# Patient Record
Sex: FEMALE | Race: WHITE | Employment: PART TIME | RURAL
[De-identification: names, ages, dates, MRNs, and addresses within clinical notes are randomized per-mention and may not be internally consistent; named-entity substitution may affect disease eponyms.]

---

## 2017-01-04 ENCOUNTER — TELEPHONE (OUTPATIENT)
Dept: FAMILY MEDICINE CLINIC | Age: 27
End: 2017-01-04

## 2017-01-04 NOTE — TELEPHONE ENCOUNTER
Pt stated she started coughing yesterday, has drainage and lots of phlegm, chest feels heavy. \" It hit her hard\". She is breast feeding her 10 mouth old and would like to be seen. Please advise.

## 2017-01-04 NOTE — TELEPHONE ENCOUNTER
Can see in a.m. salt water gargles Mucinex or Robitussin-DM acetaminophen ibuprofen as needed lots of fluids

## 2017-01-05 ENCOUNTER — OFFICE VISIT (OUTPATIENT)
Dept: FAMILY MEDICINE CLINIC | Age: 27
End: 2017-01-05

## 2017-01-05 VITALS
SYSTOLIC BLOOD PRESSURE: 114 MMHG | OXYGEN SATURATION: 97 % | HEIGHT: 64 IN | RESPIRATION RATE: 18 BRPM | BODY MASS INDEX: 24.24 KG/M2 | DIASTOLIC BLOOD PRESSURE: 76 MMHG | HEART RATE: 90 BPM | WEIGHT: 142 LBS | TEMPERATURE: 98 F

## 2017-01-05 DIAGNOSIS — J22 LOWER RESP. TRACT INFECTION: Primary | ICD-10-CM

## 2017-01-05 DIAGNOSIS — J40 BRONCHITIS: ICD-10-CM

## 2017-01-05 RX ORDER — ALBUTEROL SULFATE 0.83 MG/ML
2.5 SOLUTION RESPIRATORY (INHALATION) ONCE
Qty: 1 EACH | Refills: 0
Start: 2017-01-05 | End: 2017-01-05

## 2017-01-05 RX ORDER — ALBUTEROL SULFATE 90 UG/1
1 AEROSOL, METERED RESPIRATORY (INHALATION)
Qty: 1 INHALER | Refills: 11 | Status: SHIPPED | OUTPATIENT
Start: 2017-01-05 | End: 2019-05-29 | Stop reason: SDUPTHER

## 2017-01-05 RX ORDER — BENZONATATE 100 MG/1
100 CAPSULE ORAL
Qty: 21 CAP | Refills: 3 | Status: SHIPPED | OUTPATIENT
Start: 2017-01-05 | End: 2017-01-12

## 2017-01-05 NOTE — PROGRESS NOTES
Subjective:     Allison Hodges is a 27 y.o. female who presents today with the following:  Chief Complaint   Patient presents with    Cold Symptoms     heavy chest feeling, coughing, drainage, off balance, chills     Marisela Hicks presents with cough and sore throat x 2 days. Woke up with chest tightness and wheezing. Ill contacts her father was sick over [de-identified]. She is nursing 11 month old . She has tried decaffeinated hot tea and robitussin. Minimal sleep. ROS:  Gen: denies fever, chills, positive fatigue, weight loss, weight gain  HEENT:denies blurry vision,positive  nasal congestion, sore throat  Resp: denies dypsnea, positive cough, wheezing  CV: denies chest pain radiating to the jaws or arms, palpitations, lower extremity edema  Abd: denies nausea, vomiting, diarrhea, constipation  Neuro: denies numbness/tingling  Endo: denies polyuria, polydipsia, heat/cold intolerance  Heme: no lymphadenopathy    Allergies   Allergen Reactions    Pcn [Penicillins] Other (comments)     Family hx of allergies to PCN. Current Outpatient Prescriptions:     PNV/IRON,CARBONYL/DOCUSATE/FA (PRENA-CAP PO), Take  by mouth daily. OTC For nursing baby, Disp: , Rfl:     docusate sodium (COLACE) 100 mg capsule, Take 100 mg by mouth. 1-2/day, Disp: , Rfl:     triamcinolone acetonide (KENALOG) 0.5 % ointment, Apply  to affected area two (2) times a day. use thin layer, Disp: 30 g, Rfl: 0    Past Medical History   Diagnosis Date    ADD (attention deficit disorder)     Herpes simplex virus (HSV) infection     Hx of herpes simplex infection 05/13/2009     lips    Panic        No past surgical history on file.     History   Smoking Status    Former Smoker   Smokeless Tobacco    Not on file       Social History     Social History    Marital status: SINGLE     Spouse name: N/A    Number of children: N/A    Years of education: N/A     Social History Main Topics    Smoking status: Former Smoker    Smokeless tobacco: None    Alcohol use No    Drug use: No    Sexual activity: Yes     Partners: Male     Other Topics Concern    None     Social History Narrative       Family History   Problem Relation Age of Onset    Asthma Sister     Migraines Maternal Grandmother     Heart Disease Paternal Grandmother     Lung Disease Paternal Grandmother     Hypertension Paternal Grandmother     Hypertension Paternal Grandfather          Objective:     Visit Vitals    /76 (BP 1 Location: Left arm, BP Patient Position: Sitting)    Pulse 90    Temp 98 °F (36.7 °C) (Oral)    Resp 18    Ht 5' 4\" (1.626 m)    Wt 142 lb (64.4 kg)    SpO2 97%    BMI 24.37 kg/m2     Body mass index is 24.37 kg/(m^2). Gen: alert, oriented, no acute distress  Head: normocephalic, atraumatic  Eyes:sclera clear, conjunctiva clear  Oral: moist mucus membranes, no oral lesions, no pharyngeal exudate, positive  pharyngeal erythema, negative cervical adenopathy  Neck: symmetric normal sized thyroid, no carotid bruits, no JVD  Resp: Normal work of breathing, bilateral wheezing with expiration. No r/r  CV: S1, S2 normal.  No murmurs    Abd:  Normal bowel sounds. Soft, not tender, not distended. Skin: no rash             Extremities: no edema  Albuterol neb in office negative for wheezing post treatment. No results found for this visit on 01/05/17. Assessment/ Plan:     Bronchitis  Albuterol neb tx  In office  Tessalon for cough    Verbal and written instructions (see AVS) provided.  Patient expresses understanding of diagnosis and treatment plan.     Deepak Puente, JAZMIN

## 2017-01-05 NOTE — MR AVS SNAPSHOT
Visit Information Date & Time Provider Department Dept. Phone Encounter #  
 1/5/2017  9:00 AM Antonio Root NP [address] [phone] [id number] Upcoming Health Maintenance Date Due  
 HPV AGE 9Y-34Y (1 of 3 - Female 3 Dose Series) 6/3/2003 DTaP/Tdap/Td series (1 - Tdap) 6/3/2013 PAP AKA CERVICAL CYTOLOGY 6/3/2013 INFLUENZA AGE 9 TO ADULT 8/1/2016 Allergies as of 1/5/2017  Review Complete On: 1/5/2017 By: Antonio Root NP Severity Noted Reaction Type Reactions Pcn [Penicillins]  09/16/2013    Other (comments) Family hx of allergies to PCN. Current Immunizations  Never Reviewed No immunizations on file. Not reviewed this visit You Were Diagnosed With   
  
 Codes Comments Lower resp. tract infection    -  Primary ICD-10-CM: Ramsey Bumpers ICD-9-CM: 519.8 Vitals BP Pulse Temp Resp Height(growth percentile) Weight(growth percentile) 114/76 (BP 1 Location: Left arm, BP Patient Position: Sitting) 90 98 °F (36.7 °C) (Oral) 18 5' 4\" (1.626 m) 142 lb (64.4 kg) SpO2 BMI OB Status Smoking Status 97% 24.37 kg/m2 Recent pregnancy Former Smoker Vitals History BMI and BSA Data Body Mass Index Body Surface Area  
 24.37 kg/m 2 1.71 m 2 Preferred Pharmacy Pharmacy Name Phone [facility] [place] [address] [address] [phone] Your Updated Medication List  
  
   
This list is accurate as of: 1/5/17 10:39 AM.  Always use your most recent med list.  
  
  
  
  
 * albuterol 90 mcg/actuation inhaler Commonly known as:  PROVENTIL HFA, VENTOLIN HFA, PROAIR HFA Take 1 Puff by inhalation every six (6) hours as needed for Wheezing. Indications: BRONCHOSPASM PREVENTION  
  
 * albuterol 2.5 mg /3 mL (0.083 %) nebulizer solution Commonly known as:  PROVENTIL VENTOLIN  
3 mL by Nebulization route once for 1 dose. benzonatate 100 mg capsule Commonly known as:  TESSALON Take 1 Cap by mouth three (3) times daily as needed for Cough for up to 7 days. COLACE 100 mg capsule Generic drug:  docusate sodium Take 100 mg by mouth. 1-2/day PRENA-CAP PO Take  by mouth daily. OTC For nursing baby  
  
 triamcinolone acetonide 0.5 % ointment Commonly known as:  KENALOG Apply  to affected area two (2) times a day. use thin layer * Notice: This list has 2 medication(s) that are the same as other medications prescribed for you. Read the directions carefully, and ask your doctor or other care provider to review them with you. Prescriptions Sent to Pharmacy Refills  
 benzonatate (TESSALON) 100 mg capsule 3 Sig: Take 1 Cap by mouth three (3) times daily as needed for Cough for up to 7 days. Class: Normal  
 Pharmacy: [address] [facility] [address], [address] Ph #: [phone] Route: Oral  
 albuterol (PROVENTIL HFA, VENTOLIN HFA, PROAIR HFA) 90 mcg/actuation inhaler 11 Sig: Take 1 Puff by inhalation every six (6) hours as needed for Wheezing. Indications: BRONCHOSPASM PREVENTION Class: Normal  
 Pharmacy: [address] [facility] [address], [address] Ph #: [phone] Route: Inhalation Introducing [facility]! [facility] introduces [employer] patient portal. Now you can access parts of your medical record, email your doctor's office, and request medication refills online. 1. In your internet browser, go to [email or web address] 2. Click on the First Time User? Click Here link in the Sign In box. You will see the New Member Sign Up page. 3. Enter your [employer] Access Code exactly as it appears below. You will not need to use this code after youve completed the sign-up process. If you do not sign up before the expiration date, you must request a new code. · [employer] Access Code: [email or web address] Expires: 1/8/2017  3:01 PM 
 
 4. Enter the last four digits of your Social Security Number (xxxx) and Date of Birth (mm/dd/yyyy) as indicated and click Submit. You will be taken to the next sign-up page. 5. Create a [employer] ID. This will be your [employer] login ID and cannot be changed, so think of one that is secure and easy to remember. 6. Create a [employer] password. You can change your password at any time. 7. Enter your Password Reset Question and Answer. This can be used at a later time if you forget your password. 8. Enter your e-mail address. You will receive e-mail notification when new information is available in [address] 9. Click Sign Up. You can now view and download portions of your medical record. 10. Click the Download Summary menu link to download a portable copy of your medical information. If you have questions, please visit the Frequently Asked Questions section of the [employer] website. Remember, [employer] is NOT to be used for urgent needs. For medical emergencies, dial 911. Now available from your iPhone and Android! Please provide this summary of care documentation to your next provider. Your primary care clinician is listed as Merced Chaudhari. If you have any questions after today's visit, please call [phone].

## 2017-05-30 ENCOUNTER — TELEPHONE (OUTPATIENT)
Dept: FAMILY MEDICINE CLINIC | Age: 27
End: 2017-05-30

## 2017-05-30 NOTE — TELEPHONE ENCOUNTER
Spoke with patient. Feeling ok now just has a cough. Denies any fever,chills or sob. Advised of at home treatments,robutussin saline nasal flushes and salt water gargles. Advised to Er if symptoms get worse.

## 2017-05-30 NOTE — TELEPHONE ENCOUNTER
Patient would like to be worked in for complaints of loss of voice since Friday with a cough and sinus drainage.

## 2017-11-17 ENCOUNTER — OFFICE VISIT (OUTPATIENT)
Dept: FAMILY MEDICINE CLINIC | Age: 27
End: 2017-11-17

## 2017-11-17 VITALS
HEIGHT: 64 IN | OXYGEN SATURATION: 99 % | HEART RATE: 100 BPM | TEMPERATURE: 98.2 F | WEIGHT: 130.8 LBS | RESPIRATION RATE: 22 BRPM | BODY MASS INDEX: 22.33 KG/M2 | SYSTOLIC BLOOD PRESSURE: 90 MMHG | DIASTOLIC BLOOD PRESSURE: 56 MMHG

## 2017-11-17 DIAGNOSIS — J02.9 SORE THROAT: ICD-10-CM

## 2017-11-17 DIAGNOSIS — J02.9 PHARYNGITIS, UNSPECIFIED ETIOLOGY: Primary | ICD-10-CM

## 2017-11-17 LAB
S PYO AG THROAT QL: NEGATIVE
VALID INTERNAL CONTROL?: YES

## 2017-11-17 RX ORDER — CEFDINIR 300 MG/1
300 CAPSULE ORAL 2 TIMES DAILY
Qty: 20 CAP | Refills: 0 | Status: SHIPPED | OUTPATIENT
Start: 2017-11-17 | End: 2017-11-27

## 2017-11-17 RX ORDER — VALACYCLOVIR HYDROCHLORIDE 500 MG/1
TABLET, FILM COATED ORAL 2 TIMES DAILY
COMMUNITY
End: 2018-06-14

## 2017-11-17 NOTE — MR AVS SNAPSHOT
Visit Information Date & Time Provider Department Dept. Phone Encounter #  
 11/17/2017  7:45 AM CHRISTINE Rosado [address] [place] [address] [phone] [id number] Upcoming Health Maintenance Date Due  
 HPV AGE 9Y-34Y (1 of 3 - Female 3 Dose Series) 6/3/2003 DTaP/Tdap/Td series (1 - Tdap) 6/3/2013 PAP AKA CERVICAL CYTOLOGY 6/3/2013 Influenza Age 5 to Adult 8/1/2017 Allergies as of 11/17/2017  Review Complete On: 11/17/2017 By: CHRISTINE Rosado Severity Noted Reaction Type Reactions Pcn [Penicillins]  09/16/2013    Other (comments) Family hx of allergies to PCN. Current Immunizations  Never Reviewed No immunizations on file. Not reviewed this visit You Were Diagnosed With   
  
 Codes Comments Pharyngitis, unspecified etiology    -  Primary ICD-10-CM: J02.9 ICD-9-CM: 048 Sore throat     ICD-10-CM: J02.9 ICD-9-CM: 138 Vitals BP Pulse Temp Resp Height(growth percentile) Weight(growth percentile) 90/56 (BP 1 Location: Left arm, BP Patient Position: Sitting) 100 98.2 °F (36.8 °C) (Temporal) 22 5' 4\" (1.626 m) 130 lb 12.8 oz (59.3 kg) LMP SpO2 BMI OB Status Smoking Status 11/07/2017 99% 22.45 kg/m2 Having regular periods Former Smoker Vitals History BMI and BSA Data Body Mass Index Body Surface Area  
 22.45 kg/m 2 1.64 m 2 Preferred Pharmacy Pharmacy Name Phone [address] Megan Sutherlande Con[place] [phone] Your Updated Medication List  
  
   
This list is accurate as of: 11/17/17  8:14 AM.  Always use your most recent med list.  
  
  
  
  
 albuterol 90 mcg/actuation inhaler Commonly known as:  PROVENTIL HFA, VENTOLIN HFA, PROAIR HFA Take 1 Puff by inhalation every six (6) hours as needed for Wheezing. Indications: BRONCHOSPASM PREVENTION  
  
 cefdinir 300 mg capsule Commonly known as:  OMNICEF  
 Take 1 Cap by mouth two (2) times a day for 10 days. COLACE 100 mg capsule Generic drug:  docusate sodium Take 100 mg by mouth. 1-2/day OTHER Birth control pills  
  
 triamcinolone acetonide 0.5 % ointment Commonly known as:  KENALOG Apply  to affected area two (2) times a day. use thin layer VALTREX 500 mg tablet Generic drug:  valACYclovir Take  by mouth two (2) times a day. Prescriptions Sent to Pharmacy Refills  
 cefdinir (OMNICEF) 300 mg capsule 0 Sig: Take 1 Cap by mouth two (2) times a day for 10 days. Class: Normal  
 Pharmacy: [address] [facility] Nikko Hoffman Ph #: [phone] Route: Oral  
  
We Performed the Following AMB POC RAPID STREP A [00762 CPT(R)] Introducing [facility]! Jamarcus Ramachandran introduces [employer] patient portal. Now you can access parts of your medical record, email your doctor's office, and request medication refills online. 1. In your internet browser, go to [email or web address] 2. Click on the First Time User? Click Here link in the Sign In box. You will see the New Member Sign Up page. 3. Enter your [employer] Access Code exactly as it appears below. You will not need to use this code after youve completed the sign-up process. If you do not sign up before the expiration date, you must request a new code. · [employer] Access Code: [email or web address] Expires: 2/15/2018  8:14 AM 
 
4. Enter the last four digits of your Social Security Number (xxxx) and Date of Birth (mm/dd/yyyy) as indicated and click Submit. You will be taken to the next sign-up page. 5. Create a [employer] ID. This will be your [employer] login ID and cannot be changed, so think of one that is secure and easy to remember. 6. Create a [employer] password. You can change your password at any time. 7. Enter your Password Reset Question and Answer.  This can be used at a later time if you forget your password. 8. Enter your e-mail address. You will receive e-mail notification when new information is available in [address] 9. Click Sign Up. You can now view and download portions of your medical record. 10. Click the Download Summary menu link to download a portable copy of your medical information. If you have questions, please visit the Frequently Asked Questions section of the [employer] website. Remember, [employer] is NOT to be used for urgent needs. For medical emergencies, dial 911. Now available from your iPhone and Android! Please provide this summary of care documentation to your next provider. Your primary care clinician is listed as Raleigh Dias. If you have any questions after today's visit, please call [phone].

## 2017-11-17 NOTE — PROGRESS NOTES
[address] [place] [address] [address] [place]   [phone]     11/17/2017  Chief Complaint   Patient presents with   John FAITH  Ms. Zeeshan Ramirez is a 24 y.o. female presents today for acute care visit   C/o sore throat that began about 3 weeks ago. Initially had sinus congestion with cough that has since resolved but throat pain has persisted. Right sided. She was seen at urgent care 2 weeks ago and was treated for strep throat although she states they did not test for strep. She was treated with a Z jb. The head cold resolved but the throat pain persisted     Denies fever. Denies difficulty swallowing just reports discomfort. Past Medical History:   Diagnosis Date    ADD (attention deficit disorder)     Herpes simplex virus (HSV) infection     Hx of herpes simplex infection 05/13/2009    lips    Panic        Allergies   Allergen Reactions    Pcn [Penicillins] Other (comments)     Family hx of allergies to PCN. Current Outpatient Prescriptions   Medication Sig    OTHER Birth control pills    valACYclovir (VALTREX) 500 mg tablet Take  by mouth two (2) times a day.  cefdinir (OMNICEF) 300 mg capsule Take 1 Cap by mouth two (2) times a day for 10 days.  albuterol (PROVENTIL HFA, VENTOLIN HFA, PROAIR HFA) 90 mcg/actuation inhaler Take 1 Puff by inhalation every six (6) hours as needed for Wheezing. Indications: BRONCHOSPASM PREVENTION    triamcinolone acetonide (KENALOG) 0.5 % ointment Apply  to affected area two (2) times a day. use thin layer    docusate sodium (COLACE) 100 mg capsule Take 100 mg by mouth. 1-2/day     No current facility-administered medications for this visit.       Results for orders placed or performed in visit on 11/17/17   AMB POC RAPID STREP A   Result Value Ref Range    VALID INTERNAL CONTROL POC Yes     Group A Strep Ag Negative Negative         Visit Vitals    BP 90/56 (BP 1 Location: Left arm, BP Patient Position: Sitting)    Pulse 100    Temp 98.2 °F (36.8 °C) (Temporal)    Resp 22    Ht 5' 4\" (1.626 m)    Wt 130 lb 12.8 oz (59.3 kg)    LMP 11/07/2017    SpO2 99%    BMI 22.45 kg/m2     Physical Exam   Constitutional: She appears well-developed and well-nourished. She appears distressed. HENT:   Right Ear: Tympanic membrane normal.   Left Ear: Tympanic membrane normal.   Mouth/Throat: Uvula is midline. Posterior oropharyngeal erythema present. No oropharyngeal exudate or posterior oropharyngeal edema. Neck: No thyroid mass present. Cardiovascular: Normal heart sounds. Pulmonary/Chest: Stridor: posteriorly right side    Lymphadenopathy:        Head (right side): Tonsillar adenopathy present. She has cervical adenopathy. Vitals reviewed. ICD-10-CM ICD-9-CM    1. Pharyngitis, unspecified etiology J02.9 462 cefdinir (OMNICEF) 300 mg capsule   2. Sore throat J02.9 462 AMB POC RAPID STREP A     To use OTC analgesics as needed for pain        Follow-up Disposition:  Return if symptoms worsen or fail to improve.       Dinh Johns PA-C, NENAP

## 2017-11-29 ENCOUNTER — TELEPHONE (OUTPATIENT)
Dept: FAMILY MEDICINE CLINIC | Age: 27
End: 2017-11-29

## 2017-11-29 NOTE — TELEPHONE ENCOUNTER
Patient needs the last two years of her chart copied for . She needs this for discovery for court on Monday, December 4. Can we print this off for her or should I go through [employer] which can take up to two weeks?

## 2018-01-16 ENCOUNTER — OFFICE VISIT (OUTPATIENT)
Dept: FAMILY MEDICINE CLINIC | Age: 28
End: 2018-01-16

## 2018-01-16 VITALS
OXYGEN SATURATION: 99 % | HEART RATE: 102 BPM | TEMPERATURE: 98.4 F | HEIGHT: 64 IN | SYSTOLIC BLOOD PRESSURE: 100 MMHG | RESPIRATION RATE: 20 BRPM | DIASTOLIC BLOOD PRESSURE: 70 MMHG | WEIGHT: 136 LBS | BODY MASS INDEX: 23.22 KG/M2

## 2018-01-16 DIAGNOSIS — M79.674 PAIN OF TOE OF RIGHT FOOT: Primary | ICD-10-CM

## 2018-01-16 DIAGNOSIS — M54.2 NECK PAIN: ICD-10-CM

## 2018-01-16 RX ORDER — AZITHROMYCIN 250 MG/1
TABLET, FILM COATED ORAL
Qty: 6 TAB | Refills: 0 | Status: SHIPPED | OUTPATIENT
Start: 2018-01-16 | End: 2018-06-14

## 2018-01-16 NOTE — LETTER
NOTIFICATION RETURN TO WORK / SCHOOL 2018 Re: Win Bae : 1990 To Whom It May Concern: Shaquille Lopez is currently under the care of Alicia Messina and was seen today. She will return to work tomorrow. If there are questions or concerns please have the patient contact our office.  
 
Sincerely, 
 
 
Berta Armando NP

## 2018-01-16 NOTE — PROGRESS NOTES
Subjective:     Ryland Root is a 24 y.o. female who presents today with the following:  Chief Complaint   Patient presents with    Toe Pain     rt big toe    Neck Pain   Yairhaley Osullivan presents for an acute visit. Woke up past night her right great toe was throbbing. Trimmed her nail back soaked the toe with some improvement. Neck Pain: She feels that she slept on it the wrong way. Ffollowed by a chiropracter In [facility] due for a treatment. Plans to call and make an appointment. Declines evaluation and treatment at this time. COMPLIANT WITH MEDICATION:         ROS:  Gen: denies fever, chills, fatigue, weight loss, weight gain  HEENT:denies blurry vision, nasal congestion, sore throat  Resp: denies dypsnea, cough, wheezing  CV: denies chest pain radiating to the jaws or arms, palpitations, lower extremity edema  Abd: denies nausea, vomiting, diarrhea, constipation  Neuro: denies numbness/tingling  Endo: denies polyuria, polydipsia, heat/cold intolerance  Heme: no lymphadenopathy    Allergies   Allergen Reactions    Pcn [Penicillins] Other (comments)     Family hx of allergies to PCN. Current Outpatient Prescriptions:     azithromycin (ZITHROMAX) 250 mg tablet, Take 2 tablets today, then take 1 tablet daily  Indications: SKIN AND SKIN STRUCTURE STREP AGALACTIAE INFECTION, Disp: 6 Tab, Rfl: 0    OTHER, Birth control pills, Disp: , Rfl:     valACYclovir (VALTREX) 500 mg tablet, Take  by mouth two (2) times a day., Disp: , Rfl:     docusate sodium (COLACE) 100 mg capsule, Take 100 mg by mouth. 1-2/day, Disp: , Rfl:     triamcinolone acetonide (KENALOG) 0.5 % ointment, Apply  to affected area two (2) times a day. use thin layer, Disp: 30 g, Rfl: 0    albuterol (PROVENTIL HFA, VENTOLIN HFA, PROAIR HFA) 90 mcg/actuation inhaler, Take 1 Puff by inhalation every six (6) hours as needed for Wheezing.  Indications: BRONCHOSPASM PREVENTION, Disp: 1 Inhaler, Rfl: 11    Past Medical History:   Diagnosis Date    ADD (attention deficit disorder)     Herpes simplex virus (HSV) infection     Hx of herpes simplex infection 05/13/2009    lips    Panic        No past surgical history on file. History   Smoking Status    Former Smoker   Smokeless Tobacco    Never Used       Social History     Social History    Marital status: SINGLE     Spouse name: N/A    Number of children: N/A    Years of education: N/A     Social History Main Topics    Smoking status: Former Smoker    Smokeless tobacco: Never Used    Alcohol use No    Drug use: No    Sexual activity: Yes     Partners: Male     Other Topics Concern    None     Social History Narrative       Family History   Problem Relation Age of Onset    Asthma Sister     Migraines Maternal Grandmother     Heart Disease Paternal Grandmother     Lung Disease Paternal Grandmother     Hypertension Paternal Grandmother     Hypertension Paternal Grandfather          Objective:     Visit Vitals    /70 (BP 1 Location: Left arm, BP Patient Position: Sitting)    Pulse (!) 102    Temp 98.4 °F (36.9 °C) (Temporal)    Resp 20    Ht 5' 4\" (1.626 m)    Wt 136 lb (61.7 kg)    SpO2 99%    BMI 23.34 kg/m2     Body mass index is 23.34 kg/(m^2). General: Alert and oriented. No acute distress. Well nourished  HEENT :  Ears:TMs are normal. Canals are clear. Eyes: pupils equal, round, react to light and accommodation. Extra ocular movements intact. Nose: patent. Mouth and throat is clear. Neck:supple full range of motion no thyromegaly. Trachea midline, No carotid bruits. No significant lymphadenopathy  Lungs[de-identified] clear to auscultation without wheezes, rales, or rhonchi. Heart :RRR, S1 & S2 are normal intensity. No murmur; no gallop  Abdomen: bowel sounds active. No tenderness, guarding, rebound, masses, hepatic or spleen enlargement  Back: no CVA tenderness. Extremities: without clubbing, cyanosis, or edema. Right great toe mild  Erythema and trace edema. Pulses: radial and femoral pulses are normal  Neuro: HMF intact. Cranial nerves II through XII grossly normal.            No results found for this visit on 01/16/18. Assessment/ Plan:     Diagnoses and all orders for this visit:    1. Pain of toe of right foot    2. BMI 23.0-23.9, adult    3. Neck pain    Other orders  -     azithromycin (ZITHROMAX) 250 mg tablet; Take 2 tablets today, then take 1 tablet daily  Indications: SKIN AND SKIN STRUCTURE STREP AGALACTIAE INFECTION         1. Pain of toe of right foot    2. BMI 23.0-23.9, adult    3. Neck pain        Orders Placed This Encounter    azithromycin (ZITHROMAX) 250 mg tablet     Sig: Take 2 tablets today, then take 1 tablet daily  Indications: SKIN AND SKIN STRUCTURE STREP AGALACTIAE INFECTION     Dispense:  6 Tab     Refill:  0     Discussed soaking in epsom salt bid . Antibiotic prophylactic for skin structure infection. Verbal and written instructions (see AVS) provided.  Patient expresses understanding of diagnosis and treatment plan. Follow-up Disposition:  Return if symptoms worsen or fail to improve.       Angi Oakes, OLGAP-C

## 2018-06-14 PROBLEM — F41.1 GAD (GENERALIZED ANXIETY DISORDER): Status: ACTIVE | Noted: 2018-06-14

## 2018-08-16 ENCOUNTER — CLINICAL SUPPORT (OUTPATIENT)
Dept: FAMILY MEDICINE CLINIC | Age: 28
End: 2018-08-16

## 2018-08-16 DIAGNOSIS — F41.1 GENERALIZED ANXIETY DISORDER: Primary | ICD-10-CM

## 2018-08-16 NOTE — MR AVS SNAPSHOT
[address] [facility] [address] 
 
 
 [address] 
[phone] Patient: Allison Hodges MRN: [id number] ZSX:[id number] Visit Information Date & Time Provider Department Dept. Phone Encounter #  
 8/16/2018 10:30 AM GIL Germán Angeli [facility] PRACTICE [phone] [id number] Your Appointments 8/16/2018 10:30 AM  
[facility] Appt Note: Labs for another provider/will bring order [address] [address]  
[address] [facility] [address] [id number] Upcoming Health Maintenance Date Due  
 HPV Age 9Y-34Y (1 of 3 - Female 3 Dose Series) 6/3/2003 DTaP/Tdap/Td series (1 - Tdap) 6/3/2013 PAP AKA CERVICAL CYTOLOGY 6/3/2013 Influenza Age 5 to Adult 8/1/2018 Allergies as of 8/16/2018  Review Complete On: 6/14/2018 By: Aleksandar Martin NP Severity Noted Reaction Type Reactions Pcn [Penicillins]  09/16/2013    Other (comments) Family hx of allergies to PCN. Current Immunizations  Never Reviewed No immunizations on file. Not reviewed this visit You Were Diagnosed With   
  
 Codes Comments Generalized anxiety disorder    -  Primary ICD-10-CM: F41.1 ICD-9-CM: 300.02 Vitals OB Status Smoking Status Having regular periods Former Smoker Preferred Pharmacy Pharmacy Name Phone [address], [facility] [phone] Your Updated Medication List  
  
   
This list is accurate as of 8/16/18 10:14 AM.  Always use your most recent med list.  
  
  
  
  
 albuterol 90 mcg/actuation inhaler Commonly known as:  PROVENTIL HFA, VENTOLIN HFA, PROAIR HFA Take 1 Puff by inhalation every six (6) hours as needed for Wheezing. Indications: BRONCHOSPASM PREVENTION  
  
 fluvoxaMINE 100 mg tablet Commonly known as:  Georgana Genre Take 1 Tab by mouth nightly. hydrOXYzine HCl 50 mg tablet Commonly known as:  ATARAX Take 1 Tab by mouth nightly. May increase to 2 at HS if ineffective. Indications: anxiety, insomnia OTHER Birth control pills We Performed the Following COLLECTION VENOUS BLOOD,VENIPUNCTURE [id number] CPT(R)] T4, FREE [id number] CPT(R)] TSH 3RD GENERATION [24738 CPT(R)] To-Do List   
 08/16/2018 4:00 PM  
  Appointment with Silvia De Santiago NP at [address] [place] [address] ([phone]) Introducing [facility]! [facility] introduces y prime patient portal. Now you can access parts of your medical record, email your doctor's office, and request medication refills online. 1. In your internet browser, go to [email or web address] 2. Click on the First Time User? Click Here link in the Sign In box. You will see the New Member Sign Up page. 3. Enter your y prime Access Code exactly as it appears below. You will not need to use this code after youve completed the sign-up process. If you do not sign up before the expiration date, you must request a new code. · y prime Access Code: [email or web address] Expires: 10/1/2018  5:26 PM 
 
4. Enter the last four digits of your Social Security Number (xxxx) and Date of Birth (mm/dd/yyyy) as indicated and click Submit. You will be taken to the next sign-up page. 5. Create a y prime ID. This will be your y prime login ID and cannot be changed, so think of one that is secure and easy to remember. 6. Create a y prime password. You can change your password at any time. 7. Enter your Password Reset Question and Answer. This can be used at a later time if you forget your password. 8. Enter your e-mail address. You will receive e-mail notification when new information is available in [address] 9. Click Sign Up. You can now view and download portions of your medical record. 10. Click the Download Summary menu link to download a portable copy of your medical information. If you have questions, please visit the Frequently Asked Questions section of the [employer] website. Remember, [employer] is NOT to be used for urgent needs. For medical emergencies, dial 911. Now available from your iPhone and Android! Please provide this summary of care documentation to your next provider. Your primary care clinician is listed as Edmund Blandon. If you have any questions after today's visit, please call [phone].

## 2018-08-17 LAB
T4 FREE SERPL-MCNC: 1.02 NG/DL (ref 0.82–1.77)
TSH SERPL DL<=0.005 MIU/L-ACNC: 0.56 UIU/ML (ref 0.45–4.5)

## 2019-05-09 PROBLEM — F32.A DEPRESSION: Status: ACTIVE | Noted: 2019-05-09

## 2019-10-03 PROBLEM — G47.00 INSOMNIA DISORDER WITH NON-SLEEP DISORDER MENTAL COMORBIDITY: Status: ACTIVE | Noted: 2019-10-03

## 2020-09-01 PROBLEM — R19.8 GASTROINTESTINAL PROBLEM: Status: ACTIVE | Noted: 2019-04-25

## 2020-10-09 RX ORDER — ALBUTEROL SULFATE 90 UG/1
2 AEROSOL, METERED RESPIRATORY (INHALATION)
Qty: 1 INHALER | Refills: 1 | Status: SHIPPED | OUTPATIENT
Start: 2020-10-09 | End: 2021-07-07 | Stop reason: SDUPTHER

## 2021-03-30 ENCOUNTER — APPOINTMENT (OUTPATIENT)
Dept: BEHAVIORAL/MENTAL HEALTH | Age: 31
End: 2021-03-30

## 2021-04-21 ENCOUNTER — HOSPITAL ENCOUNTER (OUTPATIENT)
Dept: BEHAVIORAL/MENTAL HEALTH | Age: 31
Discharge: HOME | End: 2021-04-21

## 2021-04-21 VITALS
DIASTOLIC BLOOD PRESSURE: 88 MMHG | WEIGHT: 221 LBS | BODY MASS INDEX: 36.78 KG/M2 | SYSTOLIC BLOOD PRESSURE: 120 MMHG | HEART RATE: 106 BPM

## 2021-04-21 NOTE — BH NOTES
Name: Jeanine Choudhary    MRN:  [id number]   Time In: 2:15 PM     Time Out: 2:51 PM  Date: 4/21/2021           Collateral: none    Patient Identification: Jeanine Choudhary is a 31year old single mother of one son, Pedro Luis Tomas. She lives with her mother and son. She works part time as a . Progress Note: Jodie Tariq has been stressed at work recently. She has a new boss and her schedule is not guaranteed to remain stable which she needs in order to care for her son. She recently broke down at work because of anxiety and stress. Her son has been having behavior problems in school. She is not really able to comment on her mood otherwise although she feels somewhat blah. Jodie Tariq has been on 30 mg Cymbalta at night and 150 mg Wellbutrin in the AM although she misses the dose frequently. She thinks she may miss it once or twice a week. Jodie Tariq has been having trouble falling asleep since the work troubles started. She usually sleeps soundly once asleep, but she is sometimes awake until 1 AM waiting to fall asleep. She experiences daytime sleepiness at times but not everyday. Jodie Tariq has been enjoying working in the garden. She is happy to have a new little duckling that hatched. She is looking forward to a road trip with friends to [place]. She continues to be frustrated with her weight. Mental Status Examination    I. Reliability in Providing Information: Good (04/21/21 1449)    II. Personal Presentation: Looks stated age;Dresses appropriately (04/21/21 1449)    III. Motor Activity: Normal;Unremarkable (04/21/21 1449)    IV. Speech Pattern: Normal rate;Normal rhythm (04/21/21 1449)    V. Mood: Euthymic (04/21/21 1449)    Vl. Eye Contact: Good (04/21/21 1449)    Vll. Affect: Appropriate; Full (04/21/21 1449)    VllI.  Thought Processes        Thought Process: Organized (04/21/21 1449)        Thought Content: Unremarkable (04/21/21 1449)        Hallucinations: None (04/21/21 1449) Delusions: None (04/21/21 1449)        Suicidal Ideation/Attempts: No (04/21/21 1449)                 Homicidal Ideation/Attempts: No (04/21/21 1449)        Obsessional and Compulsive Symptoms: Absent (04/21/21 1449)    IX. Cognitive Functions       Orientation Level: Oriented x4 (04/21/21 1449)       Neurologic State: Alert (04/21/21 1449)       Attention/Concentration: Attentive (04/21/21 1449)       Abstract Thinking: Intact (04/21/21 1449)       Estimate of Intelligence: Average (04/21/21 1449)       Judgment: Good (04/21/21 1449)       Insight: Good (04/21/21 1449)       Memory evaluation: No (04/21/21 1449)                                    X.Risks: None evident (04/21/21 1449)     XI. Strengths and Assets Inventory: Family Support; Cooperative; Adequate living arrangements; Interests/Hobbies;Employment Status (04/21/21 1449)    VITALS:     Patient Vitals for the past 24 hrs:   Pulse BP   04/21/21 1442 (!) 106 120/88     Wt Readings from Last 3 Encounters:   04/21/21 100.2 kg (221 lb)   10/03/19 96.2 kg (212 lb)   08/08/19 93.9 kg (207 lb)     Temp Readings from Last 3 Encounters:   07/19/19 97.4 °F (36.3 °C) (Temporal)   05/29/19 97 °F (36.1 °C) (Temporal)   05/01/19 97.2 °F (36.2 °C) (Temporal)     BP Readings from Last 3 Encounters:   04/21/21 120/88   01/02/20 110/70   08/08/19 100/66     Pulse Readings from Last 3 Encounters:   04/21/21 (!) 106   08/08/19 84   07/19/19 (!) 110       Assessment: Ed Yun was somewhat vague about her symptoms and how they relate to work. It would make sense to see how she feels once she is taking her medication routinely and sleeping better. Also, I would like to see how she feels while on vacation and away from daily stressors to determine whether this is a brain problem or environmental stress problem. If a medication change needs to be made, perhaps substituting Abilify for the Wellbutrin might be a good idea.     DSM 5 Diagnoses:     Patient Active Problem List Diagnosis Date Noted    Insomnia disorder with non-sleep disorder mental comorbidity 10/03/2019    Depression 05/09/2019    Gastrointestinal problem 04/25/2019    MOHAMUD (generalized anxiety disorder) 06/14/2018         Plan:     1. Continue Wellbutrin   mg daily. Encouraged to use medication tanmay or keep this where she will see it everyday. 2.  Continue benztropine 0.5 mg BID. 3.  Hold Ambien 5 mg for 4-5 days then resume to regain effectiveness. May use benedryl or melatonin in the interim. 4.  Continue Cymbalta 30 mg HS. 5.  Follow up in 6 weeks. 6.  Discussed intermittent fasting for weight loss.

## 2021-04-30 ENCOUNTER — OFFICE VISIT (OUTPATIENT)
Dept: FAMILY MEDICINE CLINIC | Age: 31
End: 2021-04-30
Payer: MEDICAID

## 2021-04-30 VITALS
DIASTOLIC BLOOD PRESSURE: 70 MMHG | RESPIRATION RATE: 20 BRPM | TEMPERATURE: 98.1 F | WEIGHT: 248.6 LBS | OXYGEN SATURATION: 98 % | HEIGHT: 65 IN | HEART RATE: 112 BPM | SYSTOLIC BLOOD PRESSURE: 132 MMHG | BODY MASS INDEX: 41.42 KG/M2

## 2021-04-30 DIAGNOSIS — J30.1 SEASONAL ALLERGIC RHINITIS DUE TO POLLEN: ICD-10-CM

## 2021-04-30 DIAGNOSIS — L60.0 INGROWN LEFT GREATER TOENAIL: Primary | ICD-10-CM

## 2021-04-30 PROCEDURE — 99214 OFFICE O/P EST MOD 30 MIN: CPT | Performed by: NURSE PRACTITIONER

## 2021-04-30 RX ORDER — CEPHALEXIN 500 MG/1
500 CAPSULE ORAL 3 TIMES DAILY
Qty: 21 CAP | Refills: 0 | Status: SHIPPED | OUTPATIENT
Start: 2021-04-30 | End: 2021-05-21 | Stop reason: SDUPTHER

## 2021-04-30 RX ORDER — LEVOCETIRIZINE DIHYDROCHLORIDE 5 MG/1
5 TABLET, FILM COATED ORAL
Qty: 30 TAB | Refills: 5 | Status: SHIPPED | OUTPATIENT
Start: 2021-04-30 | End: 2021-11-04

## 2021-04-30 NOTE — PATIENT INSTRUCTIONS
Ingrown Toenail: Care Instructions  Your Care Instructions     An ingrown toenail often occurs because a nail is not trimmed correctly or because shoes are too tight. An ingrown nail can cause an infection. If your toe is infected, your doctor may prescribe antibiotics. Most ingrown toenails can be treated at home. You should trim toenails straight across, so the ends of the nail grow over the skin and not into it. Good nail care can prevent ingrown toenails. Follow-up care is a key part of your treatment and safety. Be sure to make and go to all appointments, and call your doctor if you are having problems. It's also a good idea to know your test results and keep a list of the medicines you take. How can you care for yourself at home? · Trim the nails straight across. Leave the corners a little longer so they do not cut into the skin. To do this when you have an ingrown nail:  ? Soak your foot in warm water for about 15 minutes to soften the nail. ? Wedge a small piece of wet cotton under the corner of the nail to cushion the nail and lift it slightly. This keeps it from cutting the skin. ? Repeat daily until the nail has grown out and can be trimmed. · Do not use manicure scissors to dig under the ingrown nail. You might stab your toe, which could get infected. · Do not trim your toenails too short. · Check with your doctor before trimming your own toenails if you have been diagnosed with diabetes or peripheral arterial disease. These conditions increase the risk of an infection, because you may have decreased sensation in your toes and cut yourself without knowing it. · Wear roomy, comfortable shoes. · If your doctor prescribed antibiotics, take them as directed. Do not stop taking them just because you feel better. You need to take the full course of antibiotics. When should you call for help?    Call your doctor now or seek immediate medical care if:    · You have signs of infection, such as:  ? Increased pain, swelling, warmth, or redness. ? Red streaks leading from the toe. ? Pus draining from the toe. ? A fever. Watch closely for changes in your health, and be sure to contact your doctor if:    · You do not get better as expected. Where can you learn more? Go to http://www.gray.com/  Enter R135 in the search box to learn more about \"Ingrown Toenail: Care Instructions. \"  Current as of: July 2, 2020               Content Version: 12.8  © 6564-8066 [employer]. Care instructions adapted under license by [employer] (which disclaims liability or warranty for this information). If you have questions about a medical condition or this instruction, always ask your healthcare professional. [address] any warranty or liability for your use of this information.

## 2021-04-30 NOTE — PROGRESS NOTES
Chief Complaint   Patient presents with    Ingrown Toenail       HPI:     is a 31 y.o. female who presents today for an acute visit with two concerns. She reports chronic bilateral great toe ingrown toenails. She soaks her feet in warm water and Epsom salts and gently lifts the nail up from the skin fold. This provides temporary relief but the nails seem to recur. She has been dealing with this for over a year. The L great toe is swollen and painful today but has improved over the past few days. Her second concern is worsening allergies. She has been using Zyrtec with minimal improvement. She tried Singulair but it seemed to cause difficulty breathing at night so she is no longer using it. Allergies   Allergen Reactions    Pcn [Penicillins] Other (comments)     Family hx of allergies to PCN. Current Outpatient Medications   Medication Sig    cephALEXin (KEFLEX) 500 mg capsule Take 1 Cap by mouth three (3) times daily for 7 days.  levocetirizine (XYZAL) 5 mg tablet Take 1 Tab by mouth daily as needed for Allergies.  benztropine (COGENTIN) 0.5 mg tablet Take 1 Tab by mouth daily. May take to 2 if needed.  buPROPion XL (WELLBUTRIN XL) 150 mg tablet Take 1 Tab by mouth every morning. With 300 mg dose.  DULoxetine (Cymbalta) 30 mg capsule Take 1 Cap by mouth daily.  zolpidem (AMBIEN) 10 mg tablet Take 1 Tab by mouth nightly as needed for Sleep. Max Daily Amount: 10 mg. May take 1/2 if full dose not needed.  omeprazole (PRILOSEC) 20 mg capsule Take 1 Cap by mouth daily.  minocycline (DYNACIN) 50 mg tablet TAKE 1 TABLET BY MOUTH TWICE DAILY    buPROPion XL (WELLBUTRIN XL) 300 mg XL tablet Take 1 Tab by mouth every morning.  albuterol (PROVENTIL HFA, VENTOLIN HFA, PROAIR HFA) 90 mcg/actuation inhaler Take 2 Puffs by inhalation every four (4) hours as needed for Wheezing.  Indications: asthma attack    norethindrone-ethinyl estradiol (MICROGESTIN 1/20) 1-20 mg-mcg tab norethindrone acetate 1 mg-ethinyl estradiol 20 mcg tablet   TAKE ONE TABLET BY MOUTH EVERY DAY    glucosam/chond-msm1/C/jozef/bor (GLUCOSAMINE-CHOND-MSM COMPLEX PO) Take  by mouth.  APPLE CIDER VINEGAR PO Take  by mouth.  b complex vitamins (B COMPLEX 1) tablet Take 1 Tab by mouth daily.  BIOTIN PO Take  by mouth.  dextromethorphan-guaiFENesin (DELSYM COUGH-CHEST CONGEST DM) 5-100 mg/5 mL liqd Take 10 mL by mouth every six to eight (6-8) hours as needed for Cough. Indications: cough    dicyclomine (BENTYL) 10 mg capsule Take 1 Cap by mouth four (4) times daily. Indications: colic     No current facility-administered medications for this visit. Past Medical History:   Diagnosis Date    ADD (attention deficit disorder)     Bronchial spasms     Celiac disease     Celiac disease 05/2019    Depression 5/9/2019    Fracture, ankle 06/14/2020    right    GERD (gastroesophageal reflux disease)     Herpes simplex virus (HSV) infection     Hx of herpes simplex infection 05/13/2009    lips    Panic        Family History   Problem Relation Age of Onset    Asthma Sister     Migraines Maternal Grandmother     Heart Disease Paternal Grandmother     Lung Disease Paternal Grandmother     Hypertension Paternal Grandmother     Hypertension Paternal Grandfather     Depression Mother         postpartum    Anxiety Mother     Alcohol abuse Father     Drug Abuse Maternal Uncle        ROS:  Denies fever, chills, cough, chest pain, SOB,  nausea, vomiting, diarrhea, dysuria. Denies rashes, + wounds, arthralgias, weakness, numbness, visual changes, depression. Denies wt loss, wt gain, hemoptysis, hematochezia or melena. Patient is not experiencing chest pain radiating to the jaw and/or down the arms.     Physical Examination:    /70 (BP 1 Location: Right arm, BP Patient Position: Sitting, BP Cuff Size: Large adult)   Pulse (!) 112   Temp 98.1 °F (36.7 °C) (Temporal)   Resp 20   Ht 5' 5\" (1.651 m)   Wt 248 lb 9.6 oz (112.8 kg)   SpO2 98%   BMI 41.37 kg/m²     Wt Readings from Last 3 Encounters:   04/30/21 248 lb 9.6 oz (112.8 kg)   04/21/21 221 lb (100.2 kg)   07/19/19 205 lb 3.2 oz (93.1 kg)     Constitutional: WDWN Female in no acute distress  HENT:  NC/AT  EYES: EOMI, PERRL  Respiratory:  Respirations even and unlabored without use of accessory muscles, CTA throughout without wheezes, rales, rubs or rhonchi. Symmetrical chest expansion. Cardiac: RRR  Musculoskeletal:  No cyanosis, clubbing or edema of extremities. Moves all extremities without difficulty. Neurologic:  Smooth, even gait without assistance, CN 2-12 grossly intact. Skin: intact and warm to the touch. Medial aspect of L great toenail erythematous, edematous, no purulent matieral noted, tender to touch, nail embedded. Nails are very short on both feet. Lymphadenopathy: no cervical or supraclavicular nodes  Psych: Pleasant and appropriate. Judgment normal. Alert and oriented x 3. ASSESSMENT AND PLAN:       ICD-10-CM ICD-9-CM    1. Ingrown left greater toenail  L60.0 703.0 cephALEXin (KEFLEX) 500 mg capsule   2. Seasonal allergic rhinitis due to pollen  J30.1 477.0 levocetirizine (XYZAL) 5 mg tablet     Start abx for ingrown toenails. Continue Epsom salts. Continue to gently lift the nail up as possible but do not trim nails until seen by Dr. Jimbo Butterfield. Schedule for a procedure in 2 weeks to do a wedge resection. Rx Xyzal. Stop cetirizine. Patient aware of plan of care and verbalized understanding. Questions answered. RTC 2 weeks, or sooner if needed.     Itzel Tan NP

## 2021-05-03 ENCOUNTER — TELEPHONE (OUTPATIENT)
Dept: PSYCHIATRY | Age: 31
End: 2021-05-03

## 2021-05-03 DIAGNOSIS — F41.9 ANXIETY: ICD-10-CM

## 2021-05-03 RX ORDER — ZOLPIDEM TARTRATE 10 MG/1
10 TABLET ORAL
Qty: 30 TAB | Refills: 3 | Status: SHIPPED | OUTPATIENT
Start: 2021-05-03 | End: 2021-08-26 | Stop reason: SDUPTHER

## 2021-05-12 ENCOUNTER — OFFICE VISIT (OUTPATIENT)
Dept: FAMILY MEDICINE CLINIC | Age: 31
End: 2021-05-12
Payer: MEDICAID

## 2021-05-12 VITALS
OXYGEN SATURATION: 99 % | SYSTOLIC BLOOD PRESSURE: 120 MMHG | HEART RATE: 110 BPM | WEIGHT: 222.6 LBS | RESPIRATION RATE: 16 BRPM | TEMPERATURE: 97.3 F | HEIGHT: 65 IN | BODY MASS INDEX: 37.09 KG/M2 | DIASTOLIC BLOOD PRESSURE: 76 MMHG

## 2021-05-12 DIAGNOSIS — L60.0 INGROWN LEFT GREATER TOENAIL: Primary | ICD-10-CM

## 2021-05-12 DIAGNOSIS — L60.0 INGROWN TOENAIL: ICD-10-CM

## 2021-05-12 PROCEDURE — 11730 AVULSION NAIL PLATE SIMPLE 1: CPT | Performed by: INTERNAL MEDICINE

## 2021-05-12 PROCEDURE — 99213 OFFICE O/P EST LOW 20 MIN: CPT | Performed by: INTERNAL MEDICINE

## 2021-05-12 RX ORDER — RANITIDINE 150 MG/1
TABLET, FILM COATED ORAL
COMMUNITY
Start: 2021-02-17 | End: 2021-08-26 | Stop reason: ALTCHOICE

## 2021-05-12 RX ORDER — FLUCONAZOLE 150 MG/1
150 TABLET ORAL DAILY
Qty: 3 TAB | Refills: 0 | Status: SHIPPED | OUTPATIENT
Start: 2021-05-12 | End: 2021-05-13

## 2021-05-12 RX ORDER — MONTELUKAST SODIUM 10 MG/1
10 TABLET ORAL AT BEDTIME
COMMUNITY

## 2021-05-12 RX ORDER — BIOTIN 1 MG
5000 TABLET ORAL DAILY
COMMUNITY
End: 2021-08-26

## 2021-05-12 RX ORDER — VENLAFAXINE HYDROCHLORIDE 37.5 MG/1
CAPSULE, EXTENDED RELEASE ORAL
COMMUNITY
End: 2021-06-24

## 2021-05-12 RX ORDER — NORETHINDRONE ACETATE AND ETHINYL ESTRADIOL AND FERROUS FUMARATE 1MG-20(24)
KIT ORAL
COMMUNITY

## 2021-05-12 NOTE — PROGRESS NOTES
Shaye Villalpando is a 31 y.o. female who presents to the office today with the following:  Chief Complaint   Patient presents with    Ingrown Toenail    Yeast Infection     Here for a follow up today on ingrown toenail. Was seen by CHRISTIAN Pierre last week - She reports chronic bilateral great toe ingrown toenails. She soaks her feet in warm water and Epsom salts and gently lifts the nail up from the skin fold. This provides temporary relief but the nails seem to recur. She has been dealing with this for over a year. The L great toe is swollen and painful today but has improved over the past few days. Placed on keflex and continued with soaking her feet. Presents today for removal    Also c/o vaginal yeast infection 2/2 antibiotic use.  + discharge and itching.  + hx yeast infections with abx use    Allergies   Allergen Reactions    Pcn [Penicillins] Other (comments)     Family hx of allergies to PCN. Current Outpatient Medications   Medication Sig    norethindrone-e estradiol-iron (Junel Fe 24) 1 mg-20 mcg (24)/75 mg (4) tab Junel Fe 24 1 mg-20 mcg (24)/75 mg (4) tablet   Take 1 tablet by mouth once daily    montelukast (SINGULAIR) 10 mg tablet Take 10 mg by mouth At bedtime.  raNITIdine (ZANTAC) 150 mg tablet ranitidine 150 mg tablet   Take 1 tablet twice a day by oral route.  venlafaxine-SR (EFFEXOR-XR) 37.5 mg capsule venlafaxine ER 37.5 mg capsule,extended release 24 hr    biotin 1 mg tab Take 5,000 mcg by mouth daily.  fluconazole (DIFLUCAN) 150 mg tablet Take 1 Tab by mouth daily for 1 day. Repeat dose after 72 hours    zolpidem (AMBIEN) 10 mg tablet Take 1 Tab by mouth nightly as needed for Sleep. Max Daily Amount: 10 mg. May take 1/2 if full dose not needed.  levocetirizine (XYZAL) 5 mg tablet Take 1 Tab by mouth daily as needed for Allergies.  benztropine (COGENTIN) 0.5 mg tablet Take 1 Tab by mouth daily. May take to 2 if needed.     buPROPion XL (WELLBUTRIN XL) 150 mg tablet Take 1 Tab by mouth every morning. With 300 mg dose.  DULoxetine (Cymbalta) 30 mg capsule Take 1 Cap by mouth daily.  omeprazole (PRILOSEC) 20 mg capsule Take 1 Cap by mouth daily.  minocycline (DYNACIN) 50 mg tablet TAKE 1 TABLET BY MOUTH TWICE DAILY    buPROPion XL (WELLBUTRIN XL) 300 mg XL tablet Take 1 Tab by mouth every morning.  albuterol (PROVENTIL HFA, VENTOLIN HFA, PROAIR HFA) 90 mcg/actuation inhaler Take 2 Puffs by inhalation every four (4) hours as needed for Wheezing. Indications: asthma attack    norethindrone-ethinyl estradiol (MICROGESTIN 1/20) 1-20 mg-mcg tab norethindrone acetate 1 mg-ethinyl estradiol 20 mcg tablet   TAKE ONE TABLET BY MOUTH EVERY DAY    glucosam/chond-msm1/C/jozef/bor (GLUCOSAMINE-CHOND-MSM COMPLEX PO) Take  by mouth.  APPLE CIDER VINEGAR PO Take  by mouth.  b complex vitamins (B COMPLEX 1) tablet Take 1 Tab by mouth daily.  BIOTIN PO Take  by mouth.  dextromethorphan-guaiFENesin (DELSYM COUGH-CHEST CONGEST DM) 5-100 mg/5 mL liqd Take 10 mL by mouth every six to eight (6-8) hours as needed for Cough. Indications: cough    dicyclomine (BENTYL) 10 mg capsule Take 1 Cap by mouth four (4) times daily. Indications: colic     No current facility-administered medications for this visit.         Past Medical History:   Diagnosis Date    ADD (attention deficit disorder)     Bronchial spasms     Celiac disease     Celiac disease 05/2019    Depression 5/9/2019    Fracture, ankle 06/14/2020    right    GERD (gastroesophageal reflux disease)     Herpes simplex virus (HSV) infection     Hx of herpes simplex infection 05/13/2009    lips    Panic        Past Surgical History:   Procedure Laterality Date    HX OTHER SURGICAL  05/2019    upper endoscopy with biopsy       Social History     Socioeconomic History    Marital status: SINGLE     Spouse name: Not on file    Number of children: Not on file    Years of education: Not on file    Highest education level: Not on file   Tobacco Use    Smoking status: Former Smoker    Smokeless tobacco: Never Used   Substance and Sexual Activity    Alcohol use: No    Drug use: No    Sexual activity: Yes     Partners: Male       Social History     Tobacco Use   Smoking Status Former Smoker   Smokeless Tobacco Never Used       Family History   Problem Relation Age of Onset    Asthma Sister     Migraines Maternal Grandmother     Heart Disease Paternal Grandmother     Lung Disease Paternal Grandmother     Hypertension Paternal Grandmother     Hypertension Paternal Grandfather     Depression Mother         postpartum    Anxiety Mother     Alcohol abuse Father     Drug Abuse Maternal Uncle        Vitals:    05/12/21 0836   BP: 120/76   BP 1 Location: Left upper arm   BP Patient Position: At rest   BP Cuff Size: Adult   Pulse: (!) 110   Resp: 16   Temp: 97.3 °F (36.3 °C)   TempSrc: Core   SpO2: 99%   Weight: 222 lb 9.6 oz (101 kg)   Height: 5' 5\" (1.651 m)         3 most recent PHQ Screens 5/12/2021   PHQ Not Done -   Little interest or pleasure in doing things Not at all   Feeling down, depressed, irritable, or hopeless Not at all   Total Score PHQ 2 0       ROS:  Denies fever, chills, cough, chest pain or pressure, SOB/DEBORAH,  nausea, vomiting, or diarrhea/constipation. No changes in vision or hearing. No new or worsening headaches. No edema, no claudication. Denies wt loss, wt gain, hemoptysis, hematochezia or melena. No dysuria, pyuria, frequency. No polydipsia, polyuria. No new weakness, arthralgias, myalgias. No weakness, dizziness, lightheadedness, numbness or tingling. No recent changes in moods.       Physical Examination:    GENERAL APPEARANCE: NAD, appears stated age, comfortable  VITAL SIGNS:   Visit Vitals  /76 (BP 1 Location: Left upper arm, BP Patient Position: At rest, BP Cuff Size: Adult)   Pulse (!) 110   Temp 97.3 °F (36.3 °C) (Core)   Resp 16   Ht 5' 5\" (1.651 m)   Wt 222 lb 9.6 oz (101 kg)   SpO2 99%   BMI 37.04 kg/m²     Physical Exam  Vitals signs and nursing note reviewed. Constitutional:       Appearance: Normal appearance. Eyes:      Extraocular Movements: Extraocular movements intact. Conjunctiva/sclera: Conjunctivae normal.      Pupils: Pupils are equal, round, and reactive to light. Cardiovascular:      Pulses: Normal pulses. Pulmonary:      Comments: No acute respiratory distress    Musculoskeletal:        Feet:    Feet:      Comments: Area of ingrown toenail, mild erythema, no discharge or purulence, mild TTP  Skin:     General: Skin is warm. Capillary Refill: Capillary refill takes less than 2 seconds. Neurological:      General: No focal deficit present. Mental Status: She is alert.        ASSESSMENT AND PLAN:      TOENAIL REMOVAL    [facility] AT [address] [facility]  OFFICE PROCEDURE PROGRESS NOTE        Chart reviewed for the following:   Jaylin AVILA DO, have reviewed the History, Physical and updated the Allergic reactions for Flor K [address] [facility] performed immediately prior to start of procedure:   Jaylin AVILA DO, have performed the following reviews on [address] prior to the start of the procedure:            * Patient was identified by name and date of birth   * Agreement on procedure being performed was verified  * Risks and Benefits explained to the patient  * Procedure site verified and marked as necessary  * Patient was positioned for comfort  * Consent was signed and verified     Time: 1575  Date of procedure: 5/12/2021  Procedure performed by:  Jaylin Gloria DO  Provider assisted by: Citlaly Spaulding LPN  Patient assisted by: Citlaly Spaulding LPN  How tolerated by patient: tolerated the procedure well with no complications  Post Procedural Pain Scale: 2 - Hurts Little Bit  Comments: none      TOENAIL REMOVAL    PRE-OP DIAGNOSIS: ingrown toenail, lateral aspect  POST-OP DIAGNOSIS:  ingrown toenail, lateral aspect  PROCEDURE: removal of the ingrown area of the toenail    ANESTHESIA AGENT(S):  Lidocaine 2% without epinephrine      INDICATIONS/COUNSELING:  -Patient desires toe nail removal and is medically indicated  -Discussed risks of procedure which include:  bleeding  pain  infection  scarring  recurrence of condition  failure to remove affected nail  need for repeat procedure    -Discussed alternatives to procedure which include:  proper trimming of nails  inserting cotton under nail edges  other-    PROCEDURE  -Toe was cleansed with chlorhexidine per  and infection control recommendations.  -Performed digital block of the Right 1st/Great toe using anesthetic agent(s) listed above.   -Elevated affected portion of nail plate from matrix and freed from adjacent cuticle and periungual skin.  -EBL less than 1ml.  -Toe was then gently irrigated with saline irrigation  -Regular gauze was placed and bandage secured with cloth tape dressing and bandage.  -Instructed the patient to follow up for fever, erythema, swelling, pain, or foul-smelling purulent discharge from the wound. Refer to podiatry    Orders Placed This Encounter    [id number] - REMOVAL OF NAIL PLATE    REFERRAL TO PODIATRY     Referral Priority:   Routine     Referral Type:   Consultation     Referral Reason:   Specialty Services Required     Number of Visits Requested:   1    norethindrone-e estradiol-iron (Junel Fe 24) 1 mg-20 mcg (24)/75 mg (4) tab     Sig: Junel Fe 24 1 mg-20 mcg (24)/75 mg (4) tablet   Take 1 tablet by mouth once daily    montelukast (SINGULAIR) 10 mg tablet     Sig: Take 10 mg by mouth At bedtime.  raNITIdine (ZANTAC) 150 mg tablet     Sig: ranitidine 150 mg tablet   Take 1 tablet twice a day by oral route.  venlafaxine-SR (EFFEXOR-XR) 37.5 mg capsule     Sig: venlafaxine ER 37.5 mg capsule,extended release 24 hr    biotin 1 mg tab     Sig: Take 5,000 mcg by mouth daily.     fluconazole (DIFLUCAN) 150 mg tablet Sig: Take 1 Tab by mouth daily for 1 day. Repeat dose after 72 hours     Dispense:  3 Tab     Refill:  0       Patient to return PRN for any new symptoms, call/come to clinic if notices any side effects from medication or any new side effects, and return for regularly scheduled visit    Patient verbalized understanding of and agreement to treatment plan. Patient has been advised to contact practice or seek care if condition persists or worsens. [place],       This documentation was facilitated by voice recognition software and may contain inadvertent typographical errors. Please note that this dictation was completed with [employer], the computer voice recognition software. Quite often unanticipated grammatical, syntax, homophones, and other interpretive errors are inadvertently transcribed by the computer software. Please disregard these errors. Please excuse any errors that have escaped final proofreading.

## 2021-05-12 NOTE — PATIENT INSTRUCTIONS
Ingrown Toenail: Care Instructions  Your Care Instructions     An ingrown toenail often occurs because a nail is not trimmed correctly or because shoes are too tight. An ingrown nail can cause an infection. If your toe is infected, your doctor may prescribe antibiotics. Most ingrown toenails can be treated at home. You should trim toenails straight across, so the ends of the nail grow over the skin and not into it. Good nail care can prevent ingrown toenails. Follow-up care is a key part of your treatment and safety. Be sure to make and go to all appointments, and call your doctor if you are having problems. It's also a good idea to know your test results and keep a list of the medicines you take. How can you care for yourself at home? · Trim the nails straight across. Leave the corners a little longer so they do not cut into the skin. To do this when you have an ingrown nail:  ? Soak your foot in warm water for about 15 minutes to soften the nail. ? Wedge a small piece of wet cotton under the corner of the nail to cushion the nail and lift it slightly. This keeps it from cutting the skin. ? Repeat daily until the nail has grown out and can be trimmed. · Do not use manicure scissors to dig under the ingrown nail. You might stab your toe, which could get infected. · Do not trim your toenails too short. · Check with your doctor before trimming your own toenails if you have been diagnosed with diabetes or peripheral arterial disease. These conditions increase the risk of an infection, because you may have decreased sensation in your toes and cut yourself without knowing it. · Wear roomy, comfortable shoes. · If your doctor prescribed antibiotics, take them as directed. Do not stop taking them just because you feel better. You need to take the full course of antibiotics. When should you call for help?    Call your doctor now or seek immediate medical care if:    · You have signs of infection, such as:  ? Increased pain, swelling, warmth, or redness. ? Red streaks leading from the toe. ? Pus draining from the toe. ? A fever. Watch closely for changes in your health, and be sure to contact your doctor if:    · You do not get better as expected. Where can you learn more? Go to http://www.gray.com/  Enter R135 in the search box to learn more about \"Ingrown Toenail: Care Instructions. \"  Current as of: July 2, 2020               Content Version: 12.8  © 7791-1185 [employer]. Care instructions adapted under license by [facility] (which disclaims liability or warranty for this information). If you have questions about a medical condition or this instruction, always ask your healthcare professional. [address] any warranty or liability for your use of this information.

## 2021-05-13 ENCOUNTER — TELEPHONE (OUTPATIENT)
Dept: FAMILY MEDICINE CLINIC | Age: 31
End: 2021-05-13

## 2021-05-13 NOTE — TELEPHONE ENCOUNTER
Sp/w pt, she states that she scheduled her appt, the July appt was the soonest they have. I recommended Dr. Elizabeth Potter in [place], she is great. She is going to call tomorrow. She may need a new referral with her insurance. She will call tomorrow and let us know.  KT

## 2021-05-13 NOTE — TELEPHONE ENCOUNTER
----- Message from [facility] sent at 5/13/2021  1:55 PM EDT -----  Regarding: Dr. Cha Milligan Message/Vendor Calls    Caller's first and last name: pt      Reason for call: advise      Callback required yes/no and why: yes, please      Best contact number(s): [phone]      Details to clarify the request: Patient has podiatry appointment with Dr. Hira Yanez on 7/14/21 and she like to know if that is too far out for her foot?       [facility]

## 2021-05-21 ENCOUNTER — TELEPHONE (OUTPATIENT)
Dept: FAMILY MEDICINE CLINIC | Age: 31
End: 2021-05-21

## 2021-05-21 DIAGNOSIS — L60.0 INGROWN LEFT GREATER TOENAIL: ICD-10-CM

## 2021-05-21 RX ORDER — CEPHALEXIN 500 MG/1
500 CAPSULE ORAL 3 TIMES DAILY
Qty: 21 CAPSULE | Refills: 0 | Status: SHIPPED | OUTPATIENT
Start: 2021-05-21 | End: 2021-05-28

## 2021-05-21 NOTE — TELEPHONE ENCOUNTER
----- Message from Vasu Pitts sent at 2021  8:23 AM EDT -----  Regarding: Do Good/Refill  Medication Refill    Caller (if not patient):n/a      Relationship of caller (if not patient): n/a      Best contact number(s): [phone]      Name of medication and dosage if known: \"antibiotic\" for an infected big toe on left foot      Is patient out of this medication (yes/no): yes      Pharmacy name: [facility], [address] listed in chart? (yes/no): yes  Pharmacy phone number:  [phone]        Details to clarify the request: stated she does not want to go throughout this weekend without this Rx      Vasu Pitts

## 2021-05-28 ENCOUNTER — TELEPHONE (OUTPATIENT)
Dept: FAMILY MEDICINE CLINIC | Age: 31
End: 2021-05-28

## 2021-05-28 NOTE — TELEPHONE ENCOUNTER
----- Message from Carlene Craft sent at 5/28/2021  9:53 AM EDT -----  Regarding: Demetrius Jeffrey/referral  Contact: [phone]  Referral    Caller (first and last name if not the patient or from practice):      Caller's relationship to patient (if not from a practice):      Name of caller (if calling from a practice):      Name of practice:podiatrist specialist      Specialist's title, first, and last name:Dr Harris      Office Phone [phone]      Fax number:      Date and time of appointment: June 2,2021 10:00 am      Reason for appointment: ingrown toenail on both feet      Details to clarify the request:       Carlene Craft

## 2021-06-24 ENCOUNTER — HOSPITAL ENCOUNTER (OUTPATIENT)
Dept: BEHAVIORAL/MENTAL HEALTH | Age: 31
Discharge: HOME | End: 2021-06-24
Payer: MEDICAID

## 2021-06-24 ENCOUNTER — OFFICE VISIT (OUTPATIENT)
Dept: FAMILY MEDICINE CLINIC | Age: 31
End: 2021-06-24
Payer: MEDICAID

## 2021-06-24 VITALS
HEART RATE: 101 BPM | OXYGEN SATURATION: 98 % | HEIGHT: 65 IN | WEIGHT: 223 LBS | TEMPERATURE: 97 F | BODY MASS INDEX: 37.15 KG/M2 | DIASTOLIC BLOOD PRESSURE: 80 MMHG | RESPIRATION RATE: 16 BRPM | SYSTOLIC BLOOD PRESSURE: 110 MMHG

## 2021-06-24 DIAGNOSIS — F34.1 DYSTHYMIA: ICD-10-CM

## 2021-06-24 DIAGNOSIS — L60.0 INGROWN TOENAIL OF BOTH FEET: ICD-10-CM

## 2021-06-24 DIAGNOSIS — F41.1 GAD (GENERALIZED ANXIETY DISORDER): ICD-10-CM

## 2021-06-24 DIAGNOSIS — Z01.818 PREOP EXAMINATION: ICD-10-CM

## 2021-06-24 DIAGNOSIS — R53.83 FATIGUE, UNSPECIFIED TYPE: Primary | ICD-10-CM

## 2021-06-24 DIAGNOSIS — R43.8 METALLIC TASTE: ICD-10-CM

## 2021-06-24 PROCEDURE — 99214 OFFICE O/P EST MOD 30 MIN: CPT | Performed by: NURSE PRACTITIONER

## 2021-06-24 PROCEDURE — 99213 OFFICE O/P EST LOW 20 MIN: CPT | Performed by: NURSE PRACTITIONER

## 2021-06-24 RX ORDER — BENZTROPINE MESYLATE 0.5 MG/1
0.5 TABLET ORAL DAILY
Qty: 60 TABLET | Refills: 2 | Status: SHIPPED | OUTPATIENT
Start: 2021-06-24 | End: 2021-12-02

## 2021-06-24 RX ORDER — DULOXETIN HYDROCHLORIDE 30 MG/1
30 CAPSULE, DELAYED RELEASE ORAL DAILY
Qty: 180 CAPSULE | Refills: 1 | Status: SHIPPED | OUTPATIENT
Start: 2021-06-24 | End: 2022-03-03 | Stop reason: SDUPTHER

## 2021-06-24 NOTE — PROGRESS NOTES
1. Have you been to the ER, urgent care clinic since your last visit? Hospitalized since your last visit? No    2. Have you seen or consulted any other health care providers outside of the [address] [place] [address] since your last visit? Include any pap smears or colon screening. No      Chief Complaint   Patient presents with    [facility] Exam      [id number] DR Servando Elliott         Visit Vitals  /80 (BP 1 Location: Right upper arm, BP Patient Position: Sitting, BP Cuff Size: Large adult)   Pulse (!) 101   Temp 97 °F (36.1 °C) (Temporal)   Resp 16   Ht 5' 4.75\" (1.645 m)   Wt 223 lb (101.2 kg)   SpO2 98%   BMI 37.40 kg/m²       Pain Scale: 0 - No pain/10  Pain Location: Patient states they have an advanced directive and will bring it in on their next visit to be scanned into their chart.

## 2021-06-24 NOTE — BH NOTES
Name: Jeanine Choudhary    MRN:  [id number]   Time In: 10:05am     Time Out: 10:32 AM  Date: 6/24/2021           Collateral: NP student, Lisha Velasquez      Patient Identification: Jeanine Choudhary is a 31year old single mother of one son, Pedro Luis Tomas. She lives with her mother and son. She works part time as a . Progress Note: Jodie Tariq states her anxiety has improved, she has not been feeling as irritable since the last visit. She recently had lost her dog and was worried that she was not responding normally, she stated she did not cry or feel too sad. She also stated she does not want to date or be in a relationship. She feels she can find enjoyment in other things like being with her son and fixing her garden. For the last few weeks she has noticed that she has been more tired. She sleeps well at night but wakes up exhausted. She feels that the Ambien is working well. She has also noticed she has been chewing more ice and wonders if she is anemic. She has a PCP appointmentt later today and will request a CBC and thyroid panel. She is currently tolerating all her medications but still feels hot and sweaty. She did note that she feels distance from her family and could be due to her increased tiredness and having a lot of thoughts in her head. She was able to enjoy the trip to [place], she states she thought it could have gone better, she went with a friend and another person she did not know. Mental Status Examination    I. Reliability in Providing Information: Good (06/24/21 1031)    II. Personal Presentation: Looks stated age;Dresses appropriately (06/24/21 1031)    III. Motor Activity: Normal (06/24/21 1031)    IV. Speech Pattern: Normal rate;Normal rhythm (06/24/21 1031)    V. Mood: Euthymic (06/24/21 1031)    Vl. Eye Contact: Good (06/24/21 1031)    Vll. Affect: Full (06/24/21 1031)    VllI.  Thought Processes        Thought Process: Organized;Goal directed (06/24/21 1031) Thought Content: Unremarkable (06/24/21 1031)        Hallucinations: None (06/24/21 1031)        Delusions: None (06/24/21 1031)        Suicidal Ideation/Attempts: No (06/24/21 1031)                 Homicidal Ideation/Attempts: No (06/24/21 1031)        Obsessional and Compulsive Symptoms: Absent (06/24/21 1031)    IX. Cognitive Functions       Orientation Level: Oriented x4 (06/24/21 1031)       Neurologic State: Alert (06/24/21 1031)       Attention/Concentration: Attentive (06/24/21 1031)       Abstract Thinking: Intact (06/24/21 1031)       Estimate of Intelligence: Average (06/24/21 1031)       Judgment: Good (06/24/21 1031)               Memory evaluation: No (06/24/21 1031)                                    X.Risks: None evident (06/24/21 1031)     XI. Strengths and Assets Inventory: Intelligence; Interests/Hobbies; Family Support;Employment History; Adequate living arrangements (06/24/21 1031)    VITALS:     No data found. Wt Readings from Last 3 Encounters:   05/12/21 101 kg (222 lb 9.6 oz)   04/30/21 112.8 kg (248 lb 9.6 oz)   04/21/21 100.2 kg (221 lb)     Temp Readings from Last 3 Encounters:   05/12/21 97.3 °F (36.3 °C) (Core)   04/30/21 98.1 °F (36.7 °C) (Temporal)   07/19/19 97.4 °F (36.3 °C) (Temporal)     BP Readings from Last 3 Encounters:   05/12/21 120/76   04/30/21 132/70   04/21/21 120/88     Pulse Readings from Last 3 Encounters:   05/12/21 (!) 110   04/30/21 (!) 112   04/21/21 (!) 106       Assessment: Flor's anxiety is improving. She is complaining of sweating even when it is cool outside. Does not feel she needs wellbutrin. DSM 5 Diagnoses:     Patient Active Problem List    Diagnosis Date Noted    Insomnia disorder with non-sleep disorder mental comorbidity 10/03/2019    Depression 05/09/2019    Gastrointestinal problem 04/25/2019    MOHAMUD (generalized anxiety disorder) 06/14/2018         Plan:  1. Discontinue Wellbutrin   mg daily. 2.  Continue benztropine 0.5 mg BID.     3. Continue Ambien 5 mg HS. 4.  Continue Cymbalta 30 mg HS. 5.  Follow up in 2 months.

## 2021-06-25 LAB
BASOPHILS # BLD AUTO: 0.1 X10E3/UL (ref 0–0.2)
BASOPHILS NFR BLD AUTO: 1 %
BUN SERPL-MCNC: 11 MG/DL (ref 6–20)
BUN/CREAT SERPL: 17 (ref 9–23)
CALCIUM SERPL-MCNC: 9.6 MG/DL (ref 8.7–10.2)
CHLORIDE SERPL-SCNC: 101 MMOL/L (ref 96–106)
CO2 SERPL-SCNC: 19 MMOL/L (ref 20–29)
CREAT SERPL-MCNC: 0.66 MG/DL (ref 0.57–1)
EOSINOPHIL # BLD AUTO: 0.4 X10E3/UL (ref 0–0.4)
EOSINOPHIL NFR BLD AUTO: 4 %
ERYTHROCYTE [DISTWIDTH] IN BLOOD BY AUTOMATED COUNT: 12.1 % (ref 11.7–15.4)
GLUCOSE SERPL-MCNC: 94 MG/DL (ref 65–99)
HCT VFR BLD AUTO: 39.3 % (ref 34–46.6)
HCV AB S/CO SERPL IA: <0.1 S/CO RATIO (ref 0–0.9)
HGB BLD-MCNC: 13.1 G/DL (ref 11.1–15.9)
IMM GRANULOCYTES # BLD AUTO: 0 X10E3/UL (ref 0–0.1)
IMM GRANULOCYTES NFR BLD AUTO: 0 %
LYMPHOCYTES # BLD AUTO: 3.4 X10E3/UL (ref 0.7–3.1)
LYMPHOCYTES NFR BLD AUTO: 34 %
MCH RBC QN AUTO: 30.4 PG (ref 26.6–33)
MCHC RBC AUTO-ENTMCNC: 33.3 G/DL (ref 31.5–35.7)
MCV RBC AUTO: 91 FL (ref 79–97)
MONOCYTES # BLD AUTO: 0.6 X10E3/UL (ref 0.1–0.9)
MONOCYTES NFR BLD AUTO: 6 %
NEUTROPHILS # BLD AUTO: 5.6 X10E3/UL (ref 1.4–7)
NEUTROPHILS NFR BLD AUTO: 55 %
PLATELET # BLD AUTO: 482 X10E3/UL (ref 150–450)
POTASSIUM SERPL-SCNC: 4.1 MMOL/L (ref 3.5–5.2)
RBC # BLD AUTO: 4.31 X10E6/UL (ref 3.77–5.28)
SODIUM SERPL-SCNC: 137 MMOL/L (ref 134–144)
TSH SERPL DL<=0.005 MIU/L-ACNC: 1.39 UIU/ML (ref 0.45–4.5)
WBC # BLD AUTO: 10 X10E3/UL (ref 3.4–10.8)

## 2021-06-26 NOTE — PROGRESS NOTES
CBC no anemia elevated platelets and lymphocytes. Metabolic panel good liver and kidney functions   Hepatitis C negative. TSH within normal limits.

## 2021-06-27 NOTE — PROGRESS NOTES
Preoperative Evaluation    Date of Exam: 2021    Toby Whitt is a 36 y.o. female (:1990) who presents for preoperative evaluation. Chief Complaint   Patient presents with    Pre-op Exam      ingrown toenail surgery  [id number] , DR Laurie Pichardo   Planned procedure   Bilateral P&A ,R &L Hallux ,R 2nd toe P&A. Latex Allergy: no    Problem List:     Patient Active Problem List    Diagnosis Date Noted    Insomnia disorder with non-sleep disorder mental comorbidity 10/03/2019    Depression 2019    Gastrointestinal problem 2019    MOHAMUD (generalized anxiety disorder) 2018     Medical History:     Past Medical History:   Diagnosis Date    ADD (attention deficit disorder)     Bronchial spasms     Celiac disease     Celiac disease 2019    Depression 2019    Fracture, ankle 2020    right    GERD (gastroesophageal reflux disease)     Herpes simplex virus (HSV) infection     Hx of herpes simplex infection 2009    lips    Panic      Allergies: Allergies   Allergen Reactions    Pcn [Penicillins] Other (comments)     Family hx of allergies to PCN. Medications:     Current Outpatient Medications   Medication Sig    DULoxetine (Cymbalta) 30 mg capsule Take 1 Capsule by mouth daily.  benztropine (COGENTIN) 0.5 mg tablet Take 1 Tablet by mouth daily. May take to 2 if needed.  norethindrone-e estradiol-iron (Junel Fe 24) 1 mg-20 mcg (24)/75 mg (4) tab Junel Fe 24 1 mg-20 mcg (24)/75 mg (4) tablet   Take 1 tablet by mouth once daily    zolpidem (AMBIEN) 10 mg tablet Take 1 Tab by mouth nightly as needed for Sleep. Max Daily Amount: 10 mg. May take 1/2 if full dose not needed.  levocetirizine (XYZAL) 5 mg tablet Take 1 Tab by mouth daily as needed for Allergies.  omeprazole (PRILOSEC) 20 mg capsule Take 1 Cap by mouth daily.     minocycline (DYNACIN) 50 mg tablet TAKE 1 TABLET BY MOUTH TWICE DAILY    albuterol (PROVENTIL HFA, VENTOLIN HFA, PROAIR HFA) 90 mcg/actuation inhaler Take 2 Puffs by inhalation every four (4) hours as needed for Wheezing. Indications: asthma attack    montelukast (SINGULAIR) 10 mg tablet Take 10 mg by mouth At bedtime. (Patient not taking: Reported on 6/24/2021)    raNITIdine (ZANTAC) 150 mg tablet ranitidine 150 mg tablet   Take 1 tablet twice a day by oral route. (Patient not taking: Reported on 6/24/2021)    biotin 1 mg tab Take 5,000 mcg by mouth daily. (Patient not taking: Reported on 6/24/2021)    glucosam/chond-msm1/C/jozef/bor (GLUCOSAMINE-CHOND-MSM COMPLEX PO) Take  by mouth. (Patient not taking: Reported on 6/24/2021)    APPLE CIDER VINEGAR PO Take  by mouth. (Patient not taking: Reported on 6/24/2021)    b complex vitamins (B COMPLEX 1) tablet Take 1 Tab by mouth daily. (Patient not taking: Reported on 6/24/2021)    BIOTIN PO Take  by mouth. (Patient not taking: Reported on 6/24/2021)    dextromethorphan-guaiFENesin (DELSYM COUGH-CHEST CONGEST DM) 5-100 mg/5 mL liqd Take 10 mL by mouth every six to eight (6-8) hours as needed for Cough. Indications: cough (Patient not taking: Reported on 6/24/2021)    dicyclomine (BENTYL) 10 mg capsule Take 1 Cap by mouth four (4) times daily. Indications: colic (Patient not taking: Reported on 6/24/2021)     No current facility-administered medications for this visit.      Surgical History:     Past Surgical History:   Procedure Laterality Date    HX OTHER SURGICAL  05/2019    upper endoscopy with biopsy     Social History:     Social History     Socioeconomic History    Marital status: SINGLE     Spouse name: Not on file    Number of children: Not on file    Years of education: Not on file    Highest education level: Not on file   Tobacco Use    Smoking status: Former Smoker    Smokeless tobacco: Never Used   Vaping Use    Vaping Use: Never used   Substance and Sexual Activity    Alcohol use: No    Drug use: No    Sexual activity: Yes     Partners: Male     Social Determinants of Health     Financial Resource Strain:     Difficulty of Paying Living Expenses:    Food Insecurity:     Worried About Running Out of Food in the Last Year:     [address] in the Last Year:    Transportation Needs:     Lack of Transportation (Medical):  Lack of Transportation (Non-Medical):    Physical Activity:     Days of Exercise per Week:     Minutes of Exercise per Session:    Stress:     Feeling of Stress :    Social Connections:     Frequency of Communication with Friends and Family:     Frequency of Social Gatherings with Friends and Family:     Attends [employer] Services:     Active Member of Clubs or Organizations:     Attends Club or Organization Meetings:     Marital Status:        Anesthesia Complications: None  History of abnormal bleeding : None  History of Blood Transfusions: no  Health Care Directive or Living Will: no and discussed    Objective:     ROS:   Feeling well. No dyspnea or chest pain on exertion. No abdominal pain, change in bowel habits, black or bloody stools. No urinary tract symptoms. GYN ROS: normal menses, no abnormal bleeding, pelvic pain or discharge, no breast pain or new or enlarging lumps on self exam. No neurological complaints. OBJECTIVE:   The patient appears well, alert, oriented x 3, in no distress. Visit Vitals  /80 (BP 1 Location: Right upper arm, BP Patient Position: Sitting, BP Cuff Size: Large adult)   Pulse (!) 101   Temp 97 °F (36.1 °C) (Temporal)   Resp 16   Ht 5' 4.75\" (1.645 m)   Wt 223 lb (101.2 kg)   SpO2 98%   BMI 37.40 kg/m²     HEENT:ENT normal.  Neck supple. No adenopathy or thyromegaly. WANDA. Chest: Lungs are clear, good air entry, no wheezes, rhonchi or rales. Cardiovascular: S1 and S2 normal, no murmurs, regular rate and rhythm. Abdomen: soft without tenderness, guarding, mass or organomegaly. Extremities: show no edema, normal peripheral pulses.    Neurological: is normal, no focal findings. BREAST EXAM: declined    PELVIC EXAM: exam declined by the patient      DIAGNOSTICS:   1. EKG: EKG FINDINGS - EKG not indicated at this time. 2. CXR: not indicated at this time. 3. Labs:   Lab Results   Component Value Date/Time    WBC 10.0 06/24/2021 12:00 AM    WBC (POC) 8.8 01/07/2014 10:23 AM    HGB 13.1 06/24/2021 12:00 AM    HGB (POC) 13.7 01/07/2014 10:23 AM    HCT 39.3 06/24/2021 12:00 AM    HCT (POC) 39.7 01/07/2014 10:23 AM    PLATELET 874 (H) 32/23/1630 12:00 AM    PLATELET (POC) 549 54/63/0845 10:23 AM    MCV 91 06/24/2021 12:00 AM    MCV (POC) 89.0 01/07/2014 10:23 AM     No results found for: CHOL, CHOLPOCT, HDL, LDL, LDLC, LDLCPOC, LDLCEXT, TRIGL, TGLPOCT, CHHD, CHHDX  Lab Results   Component Value Date/Time    TSH 1.390 06/24/2021 12:00 AM    T3 Uptake 19 (L) 04/03/2019 04:55 PM    T4, Free 1.02 08/16/2018 10:05 AM    T4, Total 6.5 04/03/2019 04:55 PM      Lab Results   Component Value Date/Time    Sodium 137 06/24/2021 12:00 AM    Potassium 4.1 06/24/2021 12:00 AM    Chloride 101 06/24/2021 12:00 AM    CO2 19 (L) 06/24/2021 12:00 AM    Glucose 94 06/24/2021 12:00 AM    BUN 11 06/24/2021 12:00 AM    Creatinine 0.66 06/24/2021 12:00 AM    BUN/Creatinine ratio 17 06/24/2021 12:00 AM    GFR est  06/24/2021 12:00 AM    GFR est non- 06/24/2021 12:00 AM    Calcium 9.6 06/24/2021 12:00 AM    Bilirubin, total <0.2 04/03/2019 04:55 PM    ALT (SGPT) 46 (H) 04/03/2019 04:55 PM    Alk.  phosphatase 58 04/03/2019 04:55 PM    Protein, total 6.9 04/03/2019 04:55 PM    Albumin 4.3 04/03/2019 04:55 PM    A-G Ratio 1.7 04/03/2019 04:55 PM        IMPRESSION:   None  Low risk for planned surgery  No contraindications to planned surgery    Pily Francisco NP   6/24/2021

## 2021-06-30 ENCOUNTER — TELEPHONE (OUTPATIENT)
Dept: FAMILY MEDICINE CLINIC | Age: 31
End: 2021-06-30

## 2021-06-30 NOTE — TELEPHONE ENCOUNTER
----- Message from Karon Becerra sent at 6/30/2021 11:43 AM EDT -----  Regarding: CHRISTIAN Martinez/Telephone    Patient return call    Caller's first and last name and relationship (if not the patient): Self      Best contact number(s): [phone]      Whose call is being returned: Nurse      Details to clarify the request: Pt requesting a call back regarding lab results. Please leave a detailed message.        Karon Becerra

## 2021-06-30 NOTE — PROGRESS NOTES
Ms Maryellen Tobar returned the [facility], was informed of lab result / advice per Jasen Galindo. She did question her TSH results being normal on the high or low end because Dr. Dylan Valderrama feels as though it could maybe playing a part of what is going on with her, so she was wondering the value?

## 2021-07-07 RX ORDER — ALBUTEROL SULFATE 90 UG/1
2 AEROSOL, METERED RESPIRATORY (INHALATION)
Qty: 1 INHALER | Refills: 1 | Status: SHIPPED | OUTPATIENT
Start: 2021-07-07

## 2021-07-12 ENCOUNTER — TELEPHONE (OUTPATIENT)
Dept: FAMILY MEDICINE CLINIC | Age: 31
End: 2021-07-12

## 2021-07-12 NOTE — TELEPHONE ENCOUNTER
JASPREET Beavers Pretty / Pharmacist to inquire the status of the EQ Omeprazole 20 mg Tab. She stated the insurance company will normally pay for capsules versus tablets. I stated to her for a verbal order, OK to substitute the capsules for the tablets per Kristen's nurse Ml Park RN and I. She stated OK and thanks.

## 2021-08-10 ENCOUNTER — VIRTUAL VISIT (OUTPATIENT)
Dept: FAMILY MEDICINE CLINIC | Age: 31
End: 2021-08-10
Payer: MEDICAID

## 2021-08-10 DIAGNOSIS — J00 ACUTE NASOPHARYNGITIS: Primary | ICD-10-CM

## 2021-08-10 PROCEDURE — 99213 OFFICE O/P EST LOW 20 MIN: CPT | Performed by: FAMILY MEDICINE

## 2021-08-10 RX ORDER — PREDNISONE 20 MG/1
TABLET ORAL
Qty: 15 TABLET | Refills: 0 | Status: SHIPPED | OUTPATIENT
Start: 2021-08-10 | End: 2021-08-26

## 2021-08-10 RX ORDER — PSEUDOEPHEDRINE HYDROCHLORIDE 60 MG/1
60 TABLET ORAL
Qty: 40 TABLET | Refills: 0 | Status: SHIPPED | OUTPATIENT
Start: 2021-08-10 | End: 2021-11-03

## 2021-08-10 RX ORDER — IPRATROPIUM BROMIDE 42 UG/1
1 SPRAY, METERED NASAL 4 TIMES DAILY
Qty: 15 ML | Refills: 0 | Status: SHIPPED | OUTPATIENT
Start: 2021-08-10 | End: 2022-09-03 | Stop reason: SDUPTHER

## 2021-08-10 NOTE — PROGRESS NOTES
1. Have you been to the ER, urgent care clinic since your last visit? Hospitalized since your last visit? No    2. Have you seen or consulted any other health care providers outside of the [address] [place] [address] since your last visit? Include any pap smears or colon screening. Dr. Ling Freire, Ortho, toe surgery      Chief Complaint   Patient presents with    Cough    Nasal Congestion    Fatigue     Patient-Reported Vitals 8/10/2021   Patient-Reported Temperature 97.1, temporal        Patient states taking tressa-seltzer and mucinex without any relief.

## 2021-08-10 NOTE — PROGRESS NOTES
Astrid Curran is a 36 y.o. female who was seen by synchronous (real-time) audio-video technology on 8/10/2021 for Cough, Nasal Congestion, and Fatigue        Assessment & Plan:   Diagnoses and all orders for this visit:    1. Acute nasopharyngitis  -     predniSONE (DELTASONE) 20 mg tablet; Take 5 tablets day one, take 4 tablets day two, take 3 tablets day three, take 2 tablets day four, take 1 tablet day five. -     ipratropium (ATROVENT) 42 mcg (0.06 %) nasal spray; 1 [place] by Both Nostrils route four (4) times daily. Indications: runny nose  -     pseudoephedrine (SUDAFED) 60 mg tablet; Take 1 Tablet by mouth every six (6) hours as needed for Congestion for up to 10 days. Subjective:   Patient started developing congestion and postnasal drip cough which is beginning to aggravate her asthma also and she was hoping for something to help relieve her symptoms of the congestion and cough. Prior to Admission medications    Medication Sig Start Date End Date Taking? Authorizing Provider   predniSONE (DELTASONE) 20 mg tablet Take 5 tablets day one, take 4 tablets day two, take 3 tablets day three, take 2 tablets day four, take 1 tablet day five. 8/10/21  Yes Wilfredo Carlisle MD   ipratropium (ATROVENT) 42 mcg (0.06 %) nasal spray 1 [place] by Both Nostrils route four (4) times daily. Indications: runny nose 8/10/21  Yes Wilfredo Carlisle MD   pseudoephedrine (SUDAFED) 60 mg tablet Take 1 Tablet by mouth every six (6) hours as needed for Congestion for up to 10 days. 8/10/21 8/20/21 Yes Wilfredo Carlisle MD   DULoxetine (Cymbalta) 30 mg capsule Take 1 Capsule by mouth daily. 6/24/21  Yes Jeremy Singh NP   norethindrone-e estradiol-iron (Junel Fe 24) 1 mg-20 mcg (24)/75 mg (4) tab Junel Fe 24 1 mg-20 mcg (24)/75 mg (4) tablet   Take 1 tablet by mouth once daily   Yes Provider, Historical   zolpidem (AMBIEN) 10 mg tablet Take 1 Tab by mouth nightly as needed for Sleep.  Max Daily Amount: 10 mg. May take 1/2 if full dose not needed. 5/3/21  Yes Lars Barrera NP   levocetirizine (XYZAL) 5 mg tablet Take 1 Tab by mouth daily as needed for Allergies. 4/30/21  Yes Michelle Abbott NP   omeprazole (PRILOSEC) 20 mg capsule Take 1 Cap by mouth daily. 12/1/20  Yes Blayne Morales NP   minocycline (DYNACIN) 50 mg tablet TAKE 1 TABLET BY MOUTH TWICE DAILY 10/26/20  Yes Blayne Morales NP   albuterol (PROVENTIL HFA, VENTOLIN HFA, PROAIR HFA) 90 mcg/actuation inhaler Take 2 Puffs by inhalation every four (4) hours as needed for Wheezing. Indications: asthma attack 7/7/21   Blayne Morales NP   benztropine (COGENTIN) 0.5 mg tablet Take 1 Tablet by mouth daily. May take to 2 if needed. Patient not taking: Reported on 8/10/2021 6/24/21   Lars Barrera NP   montelukast (SINGULAIR) 10 mg tablet Take 10 mg by mouth At bedtime. Patient not taking: Reported on 6/24/2021    Provider, Historical   raNITIdine (ZANTAC) 150 mg tablet ranitidine 150 mg tablet   Take 1 tablet twice a day by oral route. Patient not taking: Reported on 6/24/2021 2/17/21   Provider, Historical   biotin 1 mg tab Take 5,000 mcg by mouth daily. Patient not taking: Reported on 6/24/2021    Provider, Historical   glucosam/chond-msm1/C/jozef/bor (GLUCOSAMINE-CHOND-MSM COMPLEX PO) Take  by mouth. Patient not taking: Reported on 6/24/2021    Provider, Historical   APPLE CIDER VINEGAR PO Take  by mouth. Patient not taking: Reported on 6/24/2021    Provider, Historical   b complex vitamins (B COMPLEX 1) tablet Take 1 Tab by mouth daily. Patient not taking: Reported on 6/24/2021    Provider, Historical   BIOTIN PO Take  by mouth. Patient not taking: Reported on 6/24/2021    Provider, Historical   dextromethorphan-guaiFENesin (DELSYM COUGH-CHEST CONGEST DM) 5-100 mg/5 mL liqd Take 10 mL by mouth every six to eight (6-8) hours as needed for Cough.  Indications: cough  Patient not taking: Reported on 6/24/2021 5/29/19   Blayne Morales, NP dicyclomine (BENTYL) 10 mg capsule Take 1 Cap by mouth four (4) times daily. Indications: colic  Patient not taking: Reported on 6/24/2021 4/4/19   Jessie Dockery NP     Patient Active Problem List   Diagnosis Code    MOHAMUD (generalized anxiety disorder) F41.1    Depression F32.9    Insomnia disorder with non-sleep disorder mental comorbidity G47.00    Gastrointestinal problem R19.8     Patient Active Problem List    Diagnosis Date Noted    Insomnia disorder with non-sleep disorder mental comorbidity 10/03/2019    Depression 05/09/2019    Gastrointestinal problem 04/25/2019    MOHAMUD (generalized anxiety disorder) 06/14/2018     Current Outpatient Medications   Medication Sig Dispense Refill    predniSONE (DELTASONE) 20 mg tablet Take 5 tablets day one, take 4 tablets day two, take 3 tablets day three, take 2 tablets day four, take 1 tablet day five. 15 Tablet 0    ipratropium (ATROVENT) 42 mcg (0.06 %) nasal spray 1 [place] by Both Nostrils route four (4) times daily. Indications: runny nose 15 mL 0    pseudoephedrine (SUDAFED) 60 mg tablet Take 1 Tablet by mouth every six (6) hours as needed for Congestion for up to 10 days. 40 Tablet 0    DULoxetine (Cymbalta) 30 mg capsule Take 1 Capsule by mouth daily. 180 Capsule 1    norethindrone-e estradiol-iron (Junel Fe 24) 1 mg-20 mcg (24)/75 mg (4) tab Junel Fe 24 1 mg-20 mcg (24)/75 mg (4) tablet   Take 1 tablet by mouth once daily      zolpidem (AMBIEN) 10 mg tablet Take 1 Tab by mouth nightly as needed for Sleep. Max Daily Amount: 10 mg. May take 1/2 if full dose not needed. 30 Tab 3    levocetirizine (XYZAL) 5 mg tablet Take 1 Tab by mouth daily as needed for Allergies. 30 Tab 5    omeprazole (PRILOSEC) 20 mg capsule Take 1 Cap by mouth daily.  90 Cap 1    minocycline (DYNACIN) 50 mg tablet TAKE 1 TABLET BY MOUTH TWICE DAILY 180 Tab 1    albuterol (PROVENTIL HFA, VENTOLIN HFA, PROAIR HFA) 90 mcg/actuation inhaler Take 2 Puffs by inhalation every four (4) hours as needed for Wheezing. Indications: asthma attack 1 Inhaler 1    benztropine (COGENTIN) 0.5 mg tablet Take 1 Tablet by mouth daily. May take to 2 if needed. (Patient not taking: Reported on 8/10/2021) 60 Tablet 2    montelukast (SINGULAIR) 10 mg tablet Take 10 mg by mouth At bedtime. (Patient not taking: Reported on 6/24/2021)      raNITIdine (ZANTAC) 150 mg tablet ranitidine 150 mg tablet   Take 1 tablet twice a day by oral route. (Patient not taking: Reported on 6/24/2021)      biotin 1 mg tab Take 5,000 mcg by mouth daily. (Patient not taking: Reported on 6/24/2021)      glucosam/chond-msm1/C/jozef/bor (GLUCOSAMINE-CHOND-MSM COMPLEX PO) Take  by mouth. (Patient not taking: Reported on 6/24/2021)      APPLE CIDER VINEGAR PO Take  by mouth. (Patient not taking: Reported on 6/24/2021)      b complex vitamins (B COMPLEX 1) tablet Take 1 Tab by mouth daily. (Patient not taking: Reported on 6/24/2021)      BIOTIN PO Take  by mouth. (Patient not taking: Reported on 6/24/2021)      dextromethorphan-guaiFENesin (DELSYM COUGH-CHEST CONGEST DM) 5-100 mg/5 mL liqd Take 10 mL by mouth every six to eight (6-8) hours as needed for Cough. Indications: cough (Patient not taking: Reported on 6/24/2021) 118 mL 1    dicyclomine (BENTYL) 10 mg capsule Take 1 Cap by mouth four (4) times daily. Indications: colic (Patient not taking: Reported on 6/24/2021) 180 Cap 1     Allergies   Allergen Reactions    Pcn [Penicillins] Other (comments)     Family hx of allergies to PCN.      Past Medical History:   Diagnosis Date    ADD (attention deficit disorder)     Bronchial spasms     Celiac disease     Celiac disease 05/2019    Depression 5/9/2019    Fracture, ankle 06/14/2020    right    GERD (gastroesophageal reflux disease)     Herpes simplex virus (HSV) infection     Hx of herpes simplex infection 05/13/2009    lips    Panic      Past Surgical History:   Procedure Laterality Date    HX OTHER SURGICAL  05/2019 upper endoscopy with biopsy    HX OTHER SURGICAL  07/09/2021    surgery on right and left foot on toes. Family History   Problem Relation Age of Onset    Asthma Sister     Migraines Maternal Grandmother     Heart Disease Paternal Grandmother     Lung Disease Paternal Grandmother     Hypertension Paternal Grandmother     Hypertension Paternal Grandfather     Depression Mother         postpartum    Anxiety Mother     Alcohol abuse Father     Drug Abuse Maternal Uncle     Cancer Maternal Grandfather         lungs     Social History     Tobacco Use    Smoking status: Former Smoker    Smokeless tobacco: Never Used   Substance Use Topics    Alcohol use: No       Review of Systems   Constitutional: Positive for malaise/fatigue. Negative for chills and fever. HENT: Positive for congestion and sore throat. Negative for ear discharge, ear pain, hearing loss, nosebleeds and tinnitus. Eyes: Negative for blurred vision, double vision, photophobia and discharge. Respiratory: Positive for cough. Negative for sputum production, shortness of breath, wheezing and stridor. Cardiovascular: Negative for chest pain, palpitations, orthopnea and PND. Gastrointestinal: Negative for abdominal pain, diarrhea, heartburn, nausea and vomiting. Genitourinary: Negative for dysuria, frequency and urgency. Musculoskeletal: Negative for myalgias and neck pain. Skin: Negative for itching and rash. Neurological: Negative for dizziness, tingling and headaches. Endo/Heme/Allergies: Does not bruise/bleed easily. Psychiatric/Behavioral: Negative for depression. The patient has insomnia. The patient is not nervous/anxious.         Objective:     Patient-Reported Vitals 8/10/2021   Patient-Reported Temperature 97.1, temporal        [INSTRUCTIONS:  \"[x]\" Indicates a positive item  \"[]\" Indicates a negative item  -- DELETE ALL ITEMS NOT EXAMINED]    Constitutional: [x] Appears well-developed and well-nourished [x] No apparent distress      [x] Abnormal -patient sounds congested and is coughing frequently throughout the interview. Mental status: [x] Alert and awake  [x] Oriented to person/place/time [x] Able to follow commands    [] Abnormal -     Eyes:   EOM    [x]  Normal    [] Abnormal -   Sclera  [x]  Normal    [] Abnormal -          Discharge [x]  None visible   [] Abnormal -     HENT: [x] Normocephalic, atraumatic  [] Abnormal -   [x] Mouth/Throat: Mucous membranes are moist    External Ears [x] Normal  [] Abnormal -    Neck: [x] No visualized mass [] Abnormal -     Pulmonary/Chest: [x] Respiratory effort normal   [x] No visualized signs of difficulty breathing or respiratory distress        [] Abnormal -      Musculoskeletal:   [x] Normal gait with no signs of ataxia         [x] Normal range of motion of neck        [] Abnormal -     Neurological:        [x] No Facial Asymmetry (Cranial nerve 7 motor function) (limited exam due to video visit)          [x] No gaze palsy        [] Abnormal -          Skin:        [x] No significant exanthematous lesions or discoloration noted on facial skin         [] Abnormal -            Psychiatric:       [x] Normal Affect [] Abnormal -        [x] No Hallucinations    Other pertinent observable physical exam findings:-    Patient advised to go to the emergency room if her symptoms worsen and to obtain a COVID-19 test.    We discussed the expected course, resolution and complications of the diagnosis(es) in detail. Medication risks, benefits, costs, interactions, and alternatives were discussed as indicated. I advised her to contact the office if her condition worsens, changes or fails to improve as anticipated. She expressed understanding with the diagnosis(es) and plan. Teresa Bennett, was evaluated through a synchronous (real-time) audio-video encounter. The patient (or guardian if applicable) is aware that this is a billable service.  Verbal consent to proceed has been obtained within the past 12 months. The visit was conducted pursuant to the emergency declaration under the [address] [place] [address] and the [employer] General Act. Patient identification was verified, and a caregiver was present when appropriate. The patient was located in a state where the provider was credentialed to provide care.       Vgea Ornelas MD

## 2021-08-26 ENCOUNTER — HOSPITAL ENCOUNTER (OUTPATIENT)
Dept: BEHAVIORAL/MENTAL HEALTH | Age: 31
Discharge: HOME | End: 2021-08-26
Payer: MEDICAID

## 2021-08-26 VITALS — BODY MASS INDEX: 37.73 KG/M2 | WEIGHT: 225 LBS

## 2021-08-26 DIAGNOSIS — F41.9 ANXIETY: ICD-10-CM

## 2021-08-26 DIAGNOSIS — F33.41 RECURRENT MAJOR DEPRESSIVE DISORDER, IN PARTIAL REMISSION (HCC): ICD-10-CM

## 2021-08-26 DIAGNOSIS — F41.1 GAD (GENERALIZED ANXIETY DISORDER): ICD-10-CM

## 2021-08-26 PROCEDURE — 99214 OFFICE O/P EST MOD 30 MIN: CPT | Performed by: NURSE PRACTITIONER

## 2021-08-26 RX ORDER — ZOLPIDEM TARTRATE 10 MG/1
10 TABLET ORAL
Qty: 30 TABLET | Refills: 3 | Status: SHIPPED | OUTPATIENT
Start: 2021-08-26 | End: 2021-12-02 | Stop reason: SDUPTHER

## 2021-08-26 RX ORDER — LIOTHYRONINE SODIUM 25 UG/1
25 TABLET ORAL DAILY
Qty: 30 TABLET | Refills: 1 | Status: SHIPPED | OUTPATIENT
Start: 2021-08-26 | End: 2021-11-08 | Stop reason: SDUPTHER

## 2021-08-26 NOTE — BH NOTES
Name: Jordyn Morfin    MRN:  [id number]   Time In: 10:10     Time Out: 10:35 AM  Date: 8/26/2021           Collateral: none    Patient Identification: Jordyn Morfin is a 36year old single mother of one son, Fox Rodriguez. She lives with her mother and son. She works part time as a . Progress Note: Reid Barclay has been doing fairly well. She denies depressive symptoms but continues to struggle with motivation and drive. She thinks of things she would like to do and then feels like it would take too much effort to follow through. She will go out with friends but rarely initiates an outing. She has days during which she is much more productive and able to complete things. She denies symptoms of hypomania. Reid Barclay has been sleeping fairly well, but would like to pursue a sleep study. She often wakes up tired and has slept all day on occasion. She is particularly sleepy when she unknowingly eats certain types of gluten. Reid Barclay is no longer taking the benztropine. She is sweating a lot less. She has not had any adverse effect from stopping the Wellbutrin. Mental Status Examination    I. Reliability in Providing Information: Good (08/26/21 1043)    II. Personal Presentation: Looks stated age;Dresses appropriately (08/26/21 1043)    III. Motor Activity: Normal (08/26/21 1043)    IV. Speech Pattern: Normal rate;Normal rhythm (08/26/21 1043)    V. Mood: Euthymic (08/26/21 1043)    Vl. Eye Contact: Good (08/26/21 1043)    Vll. Affect: Full (08/26/21 1043)    VllI. Thought Processes        Thought Process: Organized;Goal directed (08/26/21 1043)        Thought Content: Unremarkable (08/26/21 1043)        Hallucinations: None (08/26/21 1043)        Delusions: None (08/26/21 1043)        Suicidal Ideation/Attempts: No (08/26/21 1043)                 Homicidal Ideation/Attempts: No (08/26/21 1043)        Obsessional and Compulsive Symptoms: Absent (08/26/21 1043)    IX.  Cognitive Functions Orientation Level: Oriented x4 (08/26/21 1043)       Neurologic State: Alert (08/26/21 1043)       Attention/Concentration: Attentive (08/26/21 1043)       Abstract Thinking: Intact (08/26/21 1043)       Estimate of Intelligence: Average (08/26/21 1043)       Judgment: Good (08/26/21 1043)       Insight: Good (08/26/21 1043)       Memory evaluation: No (08/26/21 1043)                                    X.Risks: None evident (08/26/21 1043)     XI. Strengths and Assets Inventory: Intelligence; Interests/Hobbies; Family Support;Employment History; Adequate living arrangements (08/26/21 1043)    VITALS:     No data found. Wt Readings from Last 3 Encounters:   08/26/21 102.1 kg (225 lb)   06/24/21 101.2 kg (223 lb)   05/12/21 101 kg (222 lb 9.6 oz)     Temp Readings from Last 3 Encounters:   06/24/21 97 °F (36.1 °C) (Temporal)   05/12/21 97.3 °F (36.3 °C) (Core)   04/30/21 98.1 °F (36.7 °C) (Temporal)     BP Readings from Last 3 Encounters:   06/24/21 110/80   05/12/21 120/76   04/30/21 132/70     Pulse Readings from Last 3 Encounters:   06/24/21 (!) 101   05/12/21 (!) 110   04/30/21 (!) 112       Assessment: Arianne Cowart may benefit from the addition of Cytomel (T3) for energy and motivation. DSM 5 Diagnoses:     Patient Active Problem List    Diagnosis Date Noted    Insomnia disorder with non-sleep disorder mental comorbidity 10/03/2019    Recurrent major depressive disorder, in partial remission ([facility] [address] 05/09/2019    Gastrointestinal problem 04/25/2019    MOHAMUD (generalized anxiety disorder) 06/14/2018         Plan:   1. Continue Cymbalta 30 mg HS. 2.  Continue Ambien 5 mg HS. 3.  Start Cytomel 25 mcg daily. Risks, benefits, and possible side effects have been discussed. 4.  Follow up in 4 weeks.

## 2021-10-21 ENCOUNTER — VIRTUAL VISIT (OUTPATIENT)
Dept: FAMILY MEDICINE CLINIC | Age: 31
End: 2021-10-21
Payer: MEDICAID

## 2021-10-21 DIAGNOSIS — H92.09 EAR ACHE: ICD-10-CM

## 2021-10-21 DIAGNOSIS — J01.00 ACUTE NON-RECURRENT MAXILLARY SINUSITIS: Primary | ICD-10-CM

## 2021-10-21 PROCEDURE — 99442 PR PHYS/QHP TELEPHONE EVALUATION 11-20 MIN: CPT | Performed by: NURSE PRACTITIONER

## 2021-10-21 RX ORDER — METHYLPREDNISOLONE 4 MG/1
TABLET ORAL
Qty: 1 DOSE PACK | Refills: 0 | Status: SHIPPED | OUTPATIENT
Start: 2021-10-21 | End: 2022-10-26 | Stop reason: ALTCHOICE

## 2021-10-21 RX ORDER — AZITHROMYCIN 250 MG/1
TABLET, FILM COATED ORAL
Qty: 6 TABLET | Refills: 0 | Status: SHIPPED | OUTPATIENT
Start: 2021-10-21 | End: 2022-10-26 | Stop reason: ALTCHOICE

## 2021-10-21 NOTE — PROGRESS NOTES
Chief Complaint   Patient presents with    Sinus Infection      and left ear pressure     1. Have you been to the ER, urgent care clinic since your last visit? Hospitalized since your last visit? no  2. Have you seen or consulted any other health care providers outside of the [address] [place] [address] since your last visit? Include any pap smears or colon screening. No  Abuse Screening Questionnaire 10/21/2021   Do you ever feel afraid of your partner? -   Are you in a relationship with someone who physically or mentally threatens you? N   Is it safe for you to go home?  Y     3 most recent PHQ Screens 10/21/2021   PHQ Not Done -   Little interest or pleasure in doing things Not at all   Feeling down, depressed, irritable, or hopeless Not at all   Total Score PHQ 2 0     Learning Assessment 4/30/2021   PRIMARY LEARNER Patient   HIGHEST LEVEL OF EDUCATION - PRIMARY LEARNER  SOME COLLEGE   BARRIERS PRIMARY LEARNER NONE   CO-LEARNER CAREGIVER No   PRIMARY LANGUAGE ENGLISH   LEARNER PREFERENCE PRIMARY READING   ANSWERED BY Patient   RELATIONSHIP SELF

## 2021-10-23 NOTE — PROGRESS NOTES
Melva Tellez is a 36 y.o. female, evaluated via audio-only technology on 10/21/2021 for Sinus Infection ( and left ear pressure)  . She endorses head congestion facial pain with left ear pressure x 5 days. Taking sudafed with minimal relief. She denies fever, body aches  Or chills. No ill contacts. We discussed the importance of rest and hydration. May take tylenol or ibuprofen as directed . Assessment & Plan:   Diagnoses and all orders for this visit:    1. Acute non-recurrent maxillary sinusitis    2. Ear ache    Other orders  -     azithromycin (ZITHROMAX) 250 mg tablet; Take 2 tablets today, then take 1 tablet daily  -     methylPREDNISolone (MEDROL DOSEPACK) 4 mg tablet; Per dose pack instructions      The complexity of medical decision making for this visit is moderate         12  Subjective:       Prior to Admission medications    Medication Sig Start Date End Date Taking? Authorizing Provider   azithromycin (ZITHROMAX) 250 mg tablet Take 2 tablets today, then take 1 tablet daily 10/21/21  Yes True CHRISTIAN Medina   methylPREDNISolone (MEDROL DOSEPACK) 4 mg tablet Per dose pack instructions 10/21/21  Yes True CHRISTIAN Medina   omeprazole (PRILOSEC) 20 mg capsule Take 1 capsule by mouth once daily 9/5/21  Yes True CHRISTIAN Medina   zolpidem (AMBIEN) 10 mg tablet Take 1 Tablet by mouth nightly as needed for Sleep. Max Daily Amount: 10 mg. May take 1/2 if full dose not needed. 8/26/21  Yes Akash Christian NP   liothyronine (Cytomel) 25 mcg tablet Take 1 Tablet by mouth daily. 8/26/21  Yes Akash Christian NP   ipratropium (ATROVENT) 42 mcg (0.06 %) nasal spray 1 [place] by Both Nostrils route four (4) times daily. Indications: runny nose 8/10/21  Yes David Carlisle., MD   albuterol (PROVENTIL HFA, VENTOLIN HFA, PROAIR HFA) 90 mcg/actuation inhaler Take 2 Puffs by inhalation every four (4) hours as needed for Wheezing.  Indications: asthma attack 7/7/21  Yes Marbin Medina NP   DULoxetine (Cymbalta) 30 mg capsule Take 1 Capsule by mouth daily. 6/24/21  Yes Amee Boswell NP   norethindrone-e estradiol-iron (Junel Fe 24) 1 mg-20 mcg (24)/75 mg (4) tab Junel Fe 24 1 mg-20 mcg (24)/75 mg (4) tablet   Take 1 tablet by mouth once daily   Yes Provider, Historical   montelukast (SINGULAIR) 10 mg tablet Take 10 mg by mouth At bedtime. Yes Provider, Historical   minocycline (DYNACIN) 50 mg tablet TAKE 1 TABLET BY MOUTH TWICE DAILY 10/26/20  Yes Diana Gallagher NP   benztropine (COGENTIN) 0.5 mg tablet Take 1 Tablet by mouth daily. May take to 2 if needed. Patient not taking: Reported on 8/10/2021 6/24/21   Amee Boswell NP   levocetirizine (XYZAL) 5 mg tablet Take 1 Tab by mouth daily as needed for Allergies. Patient not taking: Reported on 8/26/2021 4/30/21   Alba Haley NP   APPLE CIDER VINEGAR PO Take  by mouth. Patient not taking: Reported on 6/24/2021    Provider, Historical   BIOTIN PO Take  by mouth. Patient not taking: Reported on 6/24/2021    Provider, Historical   dextromethorphan-guaiFENesin (DELSYM COUGH-CHEST CONGEST DM) 5-100 mg/5 mL liqd Take 10 mL by mouth every six to eight (6-8) hours as needed for Cough. Indications: cough  Patient not taking: Reported on 6/24/2021 5/29/19   Diana Gallagher NP   dicyclomine (BENTYL) 10 mg capsule Take 1 Cap by mouth four (4) times daily.  Indications: colic  Patient not taking: Reported on 6/24/2021 4/4/19   Diana Gallagher NP     Patient Active Problem List   Diagnosis Code    MOHAMUD (generalized anxiety disorder) F41.1    Recurrent major depressive disorder, in partial remission ([facility] [address] F33.41    Insomnia disorder with non-sleep disorder mental comorbidity G47.00    Gastrointestinal problem R19.8     Patient Active Problem List    Diagnosis Date Noted    Insomnia disorder with non-sleep disorder mental comorbidity 10/03/2019    Recurrent major depressive disorder, in partial remission ([facility] [address] 05/09/2019    Gastrointestinal problem 04/25/2019    MOHAMUD (generalized anxiety disorder) 06/14/2018     Current Outpatient Medications   Medication Sig Dispense Refill    azithromycin (ZITHROMAX) 250 mg tablet Take 2 tablets today, then take 1 tablet daily 6 Tablet 0    methylPREDNISolone (MEDROL DOSEPACK) 4 mg tablet Per dose pack instructions 1 Dose Pack 0    omeprazole (PRILOSEC) 20 mg capsule Take 1 capsule by mouth once daily 90 Capsule 0    zolpidem (AMBIEN) 10 mg tablet Take 1 Tablet by mouth nightly as needed for Sleep. Max Daily Amount: 10 mg. May take 1/2 if full dose not needed. 30 Tablet 3    liothyronine (Cytomel) 25 mcg tablet Take 1 Tablet by mouth daily. 30 Tablet 1    ipratropium (ATROVENT) 42 mcg (0.06 %) nasal spray 1 [place] by Both Nostrils route four (4) times daily. Indications: runny nose 15 mL 0    albuterol (PROVENTIL HFA, VENTOLIN HFA, PROAIR HFA) 90 mcg/actuation inhaler Take 2 Puffs by inhalation every four (4) hours as needed for Wheezing. Indications: asthma attack 1 Inhaler 1    DULoxetine (Cymbalta) 30 mg capsule Take 1 Capsule by mouth daily. 180 Capsule 1    norethindrone-e estradiol-iron (Junel Fe 24) 1 mg-20 mcg (24)/75 mg (4) tab Junel Fe 24 1 mg-20 mcg (24)/75 mg (4) tablet   Take 1 tablet by mouth once daily      montelukast (SINGULAIR) 10 mg tablet Take 10 mg by mouth At bedtime.  minocycline (DYNACIN) 50 mg tablet TAKE 1 TABLET BY MOUTH TWICE DAILY 180 Tab 1    benztropine (COGENTIN) 0.5 mg tablet Take 1 Tablet by mouth daily. May take to 2 if needed. (Patient not taking: Reported on 8/10/2021) 60 Tablet 2    levocetirizine (XYZAL) 5 mg tablet Take 1 Tab by mouth daily as needed for Allergies. (Patient not taking: Reported on 8/26/2021) 30 Tab 5    APPLE CIDER VINEGAR PO Take  by mouth. (Patient not taking: Reported on 6/24/2021)      BIOTIN PO Take  by mouth.  (Patient not taking: Reported on 6/24/2021)      dextromethorphan-guaiFENesin (DELSYM COUGH-CHEST CONGEST DM) 5-100 mg/5 mL liqd Take 10 mL by mouth every six to eight (6-8) hours as needed for Cough. Indications: cough (Patient not taking: Reported on 6/24/2021) 118 mL 1    dicyclomine (BENTYL) 10 mg capsule Take 1 Cap by mouth four (4) times daily. Indications: colic (Patient not taking: Reported on 6/24/2021) 180 Cap 1     Allergies   Allergen Reactions    Pcn [Penicillins] Other (comments)     Family hx of allergies to PCN. Past Medical History:   Diagnosis Date    ADD (attention deficit disorder)     Bronchial spasms     Celiac disease     Celiac disease 05/2019    Depression 5/9/2019    Fracture, ankle 06/14/2020    right    GERD (gastroesophageal reflux disease)     Herpes simplex virus (HSV) infection     Hx of herpes simplex infection 05/13/2009    lips    Panic      Past Surgical History:   Procedure Laterality Date    HX OTHER SURGICAL  05/2019    upper endoscopy with biopsy    HX OTHER SURGICAL  07/09/2021    surgery on right and left foot on toes.      Family History   Problem Relation Age of Onset    Asthma Sister     Migraines Maternal Grandmother     Heart Disease Paternal Grandmother     Lung Disease Paternal Grandmother     Hypertension Paternal Grandmother     Hypertension Paternal Grandfather     Depression Mother         postpartum    Anxiety Mother     Alcohol abuse Father     Drug Abuse Maternal Uncle     Cancer Maternal Grandfather         lungs     Social History     Tobacco Use    Smoking status: Former Smoker    Smokeless tobacco: Never Used   Substance Use Topics    Alcohol use: No       ROS  Pertinent items are noted on the HPI  Patient-Reported Vitals 10/21/2021   Patient-Reported Temperature 97.3 temple   Patient-Reported LMP on Biirth control, no period       Melva Tellez, who was evaluated through a patient-initiated, synchronous (real-time) audio only encounter, and/or her healthcare decision maker, is aware that it is a billable service, with coverage as determined by her insurance carrier. She provided verbal consent to proceed: Yes. She has not had a related appointment within my department in the past 7 days or scheduled within the next 24 hours. On this date 10/21/2021 I have spent 15 minutes reviewing previous notes, test results and face to face (virtual) with the patient discussing the diagnosis and importance of compliance with the treatment plan as well as documenting on the day of the visit.     Rodríguez Bai NP

## 2021-11-03 DIAGNOSIS — J30.1 SEASONAL ALLERGIC RHINITIS DUE TO POLLEN: ICD-10-CM

## 2021-11-03 DIAGNOSIS — J00 ACUTE NASOPHARYNGITIS: ICD-10-CM

## 2021-11-03 RX ORDER — PSEUDOEPHEDRINE HYDROCHLORIDE 60 MG/1
TABLET, FILM COATED ORAL
Qty: 40 TABLET | Refills: 0 | Status: SHIPPED | OUTPATIENT
Start: 2021-11-03

## 2021-11-04 ENCOUNTER — TELEPHONE (OUTPATIENT)
Dept: FAMILY MEDICINE CLINIC | Age: 31
End: 2021-11-04

## 2021-11-04 RX ORDER — LEVOCETIRIZINE DIHYDROCHLORIDE 5 MG/1
TABLET, FILM COATED ORAL
Qty: 30 TABLET | Refills: 0 | Status: SHIPPED | OUTPATIENT
Start: 2021-11-04 | End: 2021-12-16

## 2021-11-08 ENCOUNTER — TELEPHONE (OUTPATIENT)
Dept: PSYCHIATRY | Age: 31
End: 2021-11-08

## 2021-11-08 RX ORDER — LIOTHYRONINE SODIUM 25 UG/1
25 TABLET ORAL DAILY
Qty: 30 TABLET | Refills: 1 | Status: SHIPPED | OUTPATIENT
Start: 2021-11-08 | End: 2021-12-02

## 2021-12-02 ENCOUNTER — HOSPITAL ENCOUNTER (OUTPATIENT)
Dept: BEHAVIORAL/MENTAL HEALTH | Age: 31
Discharge: HOME | End: 2021-12-02
Payer: MEDICAID

## 2021-12-02 DIAGNOSIS — F41.9 ANXIETY: ICD-10-CM

## 2021-12-02 DIAGNOSIS — F33.41 RECURRENT MAJOR DEPRESSIVE DISORDER, IN PARTIAL REMISSION (HCC): ICD-10-CM

## 2021-12-02 DIAGNOSIS — F41.1 GAD (GENERALIZED ANXIETY DISORDER): ICD-10-CM

## 2021-12-02 PROCEDURE — 99214 OFFICE O/P EST MOD 30 MIN: CPT | Performed by: NURSE PRACTITIONER

## 2021-12-02 RX ORDER — ARIPIPRAZOLE 2 MG/1
2 TABLET ORAL DAILY
Qty: 30 TABLET | Refills: 1 | Status: SHIPPED | OUTPATIENT
Start: 2021-12-02 | End: 2022-02-03 | Stop reason: SDUPTHER

## 2021-12-02 RX ORDER — ZOLPIDEM TARTRATE 10 MG/1
10 TABLET ORAL
Qty: 30 TABLET | Refills: 3 | Status: SHIPPED | OUTPATIENT
Start: 2021-12-02 | End: 2022-03-03 | Stop reason: SDUPTHER

## 2021-12-02 NOTE — BH NOTES
Name: Herrera Pimentel    MRN:  [id number]   Time In: 10:05 AM     Time Out: 10:38 AM  Date: 12/2/2021           Collateral: none    Patient Identification: Herrera Pimentel is a 36year old single mother of one son, Maria Luisa Burns. She lives with her mother and son. She works part time as a . Progress Note: Elena Franz had a bad spot in the middle of October. She had difficulty getting out of bed and doing anything. She didn't look forward to Halloween which is her favorite holiday. She was able to function at work but had no drive and just accomplished the minimum to get by. She felt imbalanced and her anxiety level was really high. She feels that she has leveled out a bit but is still overwhelmed by lots of things. She has difficulty falling asleep because she can't shut her brain off and her OCD symptoms are worse. Once asleep, she is able to sleep really well. She rarely takes naps. Elena Franz has been trying to take a day for herself, and enjoys looking in [employer]. She is excited about her sister's pregnancy but isn't enjoying things like she would normally. She took her son to [facility] on [address] and felt no excitement or enjoyment. Elena Franz has been trying to eat only when actually hungry. She rarely drinks alcohol, about 2 glasses of wine per month. She has not been exercising due to the gym still being closed and she recently strained an ankle. Elena Franz is tolerating the Cytomel but has noticed only minimal improvement if any on it. Elena Franz has tried counseling several times in the past and has determined that it is not for her. Mental Status Examination    I. Reliability in Providing Information: Good (12/02/21 1037)    II. Personal Presentation: Looks stated age; Dresses appropriately (12/02/21 1037)    III. Motor Activity: Normal (12/02/21 1037)    IV. Speech Pattern: Normal rate; Normal rhythm (12/02/21 1037)    V. Mood: Euthymic (12/02/21 1037)    Vl.  Eye Contact: Good (12/02/21 1037)    Vll. Affect: Full (12/02/21 1037)    VllI. Thought Processes        Thought Process: Organized; Goal directed (12/02/21 1037)        Thought Content: Unremarkable (12/02/21 1037)        Hallucinations: None (12/02/21 1037)        Delusions: None (12/02/21 1037)        Suicidal Ideation/Attempts: No (12/02/21 1037)                 Homicidal Ideation/Attempts: No (12/02/21 1037)        Obsessional and Compulsive Symptoms: Absent (12/02/21 1037)    IX. Cognitive Functions       Orientation Level: Oriented x4 (12/02/21 1037)       Neurologic State: Alert (12/02/21 1037)               Abstract Thinking: Intact (12/02/21 1037)       Estimate of Intelligence: Average (12/02/21 1037)       Judgment: Good (12/02/21 1037)       Insight: Good (12/02/21 1037)       Memory evaluation: No (12/02/21 1037)                                    X.Risks: None evident (12/02/21 1037)     XI. Strengths and Assets Inventory: Intelligence; Interests/Hobbies; Family Support; Employment History; Adequate living arrangements (12/02/21 1037)    VITALS:     No data found. Wt Readings from Last 3 Encounters:   08/26/21 102.1 kg (225 lb)   06/24/21 101.2 kg (223 lb)   05/12/21 101 kg (222 lb 9.6 oz)     Temp Readings from Last 3 Encounters:   06/24/21 97 °F (36.1 °C) (Temporal)   05/12/21 97.3 °F (36.3 °C) (Core)   04/30/21 98.1 °F (36.7 °C) (Temporal)     BP Readings from Last 3 Encounters:   06/24/21 110/80   05/12/21 120/76   04/30/21 132/70     Pulse Readings from Last 3 Encounters:   06/24/21 (!) 101   05/12/21 (!) 110   04/30/21 (!) 112       Assessment: Flor did not respond with any significant improvement on the Cytomel and her mood is worse than before. She has never been on antipsychotic adjunct therapy and is willing to try.     DSM 5 Diagnoses:     Patient Active Problem List    Diagnosis Date Noted    Insomnia disorder with non-sleep disorder mental comorbidity 10/03/2019    Recurrent major depressive disorder, in partial remission ([facility] [address] 05/09/2019    Gastrointestinal problem 04/25/2019    MOHAMUD (generalized anxiety disorder) 06/14/2018         Plan:   1. Continue Cymbalta 30 mg HS. 2.  Continue Ambien 5 mg HS. 3.  Reduce Cytomel 25 mcg 1/2 tab daily for 2 weeks then DC. 4.  Start aripiprazole 2 mg 1 po daily. Risks, benefits, and possible side effects have been discussed and patient education materials given. 5.  Follow up in 6 weeks.

## 2021-12-08 ENCOUNTER — TELEPHONE (OUTPATIENT)
Dept: FAMILY MEDICINE CLINIC | Age: 31
End: 2021-12-08

## 2021-12-08 NOTE — TELEPHONE ENCOUNTER
----- Message from [employer] Colleen sent at 12/8/2021  8:55 AM EST -----  Subject: Referral Request    QUESTIONS   Reason for referral request? sleep study   Has the physician seen you for this condition before? No   Preferred Specialist (if applicable)? Do you already have an appointment scheduled? Additional Information for Provider?   ---------------------------------------------------------------------------  --------------  CALL BACK INFO  What is the best way for the office to contact you? OK to leave message on   voicemail  Preferred Call Back Phone Number?  [phone]

## 2021-12-10 NOTE — TELEPHONE ENCOUNTER
Please schedule a VV for insomnia requesting a sleep study. I will see her on the 15 th or 14 th Thanks!  DL

## 2021-12-15 DIAGNOSIS — J30.1 SEASONAL ALLERGIC RHINITIS DUE TO POLLEN: ICD-10-CM

## 2021-12-16 RX ORDER — LEVOCETIRIZINE DIHYDROCHLORIDE 5 MG/1
TABLET, FILM COATED ORAL
Qty: 30 TABLET | Refills: 0 | Status: SHIPPED | OUTPATIENT
Start: 2021-12-16 | End: 2022-01-07

## 2022-01-11 RX ORDER — ALBUTEROL SULFATE 90 UG/1
2 AEROSOL, METERED RESPIRATORY (INHALATION)
Qty: 1 EACH | Refills: 5 | Status: SHIPPED | OUTPATIENT
Start: 2022-01-11 | End: 2022-02-10

## 2022-01-20 ENCOUNTER — HOSPITAL ENCOUNTER (OUTPATIENT)
Dept: BEHAVIORAL/MENTAL HEALTH | Age: 32
Discharge: HOME | End: 2022-01-20
Payer: MEDICAID

## 2022-01-20 DIAGNOSIS — G47.00 INSOMNIA DISORDER WITH NON-SLEEP DISORDER MENTAL COMORBIDITY: ICD-10-CM

## 2022-01-20 DIAGNOSIS — F41.1 GAD (GENERALIZED ANXIETY DISORDER): ICD-10-CM

## 2022-01-20 DIAGNOSIS — F33.41 RECURRENT MAJOR DEPRESSIVE DISORDER, IN PARTIAL REMISSION (HCC): ICD-10-CM

## 2022-01-20 PROCEDURE — 99213 OFFICE O/P EST LOW 20 MIN: CPT | Performed by: NURSE PRACTITIONER

## 2022-01-20 NOTE — BH NOTES
Name: Gladys Echevarria    MRN:  [id number]   Time In: 11:00 AM     Time Out: 11:16 AM  Date: 1/20/2022           Collateral: none    Patient Identification: Gladys Echevarria is a 36year old single mother of one son, Lorrie Ibrahim. She lives with her mother and son. She works part time as a . Progress Note: This was a audio-video real time technology patient initiated visit due to the Covid 19 pandemic. The patient is aware that this is a billable service (including copays) with coverage as determined by the insuance carrier. Verbal consent was provided by the patient or guardian to proceed. Nacho Kwan has been doing better since starting the aripiprazole. She is feeling happier but not all the way where she wants to be. Her motivation is improved and she is doing more. She is able to express her emotions better and is no longer feeling flat. She is excited about the birth of her sister's first child. She has not seen any difference in her OCD symptoms. Nacho Kwan has not been sleeping well for the past week. She takes the aripiprazole at 9 PM with Ambien but is not able to readily fall asleep. Nacho Kwan has noticed her appetite has gone down. She is now only eating when she is hungry. She is tolerating the aripiprazole without adverse effect. Mental Status Examination    I. Reliability in Providing Information: Good (01/20/22 1115)    II. Personal Presentation: Looks stated age;Dresses appropriately (01/20/22 1115)    III. Motor Activity: Normal (01/20/22 1115)    IV. Speech Pattern: Normal rate;Normal rhythm (01/20/22 1115)    V. Mood: Euthymic (01/20/22 1115)    Vl. Eye Contact: Good (01/20/22 1115)    Vll. Affect: Full;Appropriate (01/20/22 1115)    VllI.  Thought Processes        Thought Process: Organized;Goal directed (01/20/22 1115)        Thought Content: Unremarkable (01/20/22 1115)        Hallucinations: None (01/20/22 1115)        Delusions: None (01/20/22 1115) Suicidal Ideation/Attempts: No (01/20/22 1115)                 Homicidal Ideation/Attempts: No (01/20/22 1115)        Obsessional and Compulsive Symptoms: Absent (01/20/22 1115)    IX. Cognitive Functions       Orientation Level: Oriented x4 (01/20/22 1115)       Neurologic State: Alert (01/20/22 1115)       Attention/Concentration: Attentive (01/20/22 1115)       Abstract Thinking: Intact (01/20/22 1115)       Estimate of Intelligence: Average (01/20/22 1115)       Judgment: Good (01/20/22 1115)       Insight: Good (01/20/22 1115)       Memory evaluation: No (01/20/22 1115)                                    X.Risks: None evident (01/20/22 1115)     XI. Strengths and Assets Inventory: Intelligence; Interests/Hobbies; Family Support;Employment History; Adequate living arrangements (01/20/22 1115)    VITALS:     No data found. Wt Readings from Last 3 Encounters:   08/26/21 102.1 kg (225 lb)   06/24/21 101.2 kg (223 lb)   05/12/21 101 kg (222 lb 9.6 oz)     Temp Readings from Last 3 Encounters:   06/24/21 97 °F (36.1 °C) (Temporal)   05/12/21 97.3 °F (36.3 °C) (Core)   04/30/21 98.1 °F (36.7 °C) (Temporal)     BP Readings from Last 3 Encounters:   06/24/21 110/80   05/12/21 120/76   04/30/21 132/70     Pulse Readings from Last 3 Encounters:   06/24/21 (!) 101   05/12/21 (!) 110   04/30/21 (!) 112       Assessment: Nacho Kwan has had a positive response to aripiprazole. She may have become a bit activated. Morning dosing of the aripiprazole may be more helpful    DSM 5 Diagnoses:     Patient Active Problem List    Diagnosis Date Noted    Insomnia disorder with non-sleep disorder mental comorbidity 10/03/2019    Recurrent major depressive disorder, in partial remission ([facility] [address] 05/09/2019    Gastrointestinal problem 04/25/2019    MOHAMUD (generalized anxiety disorder) 06/14/2018         Plan:   1. Continue Cymbalta 30 mg HS. 2.  Continue Ambien 5 mg HS. 3.   Continue aripiprazole 2 mg 1 po daily. Move dose to morning.   4. Follow up in 6 weeks.

## 2022-02-03 ENCOUNTER — TELEPHONE (OUTPATIENT)
Dept: PSYCHIATRY | Age: 32
End: 2022-02-03

## 2022-02-03 RX ORDER — ARIPIPRAZOLE 2 MG/1
2 TABLET ORAL DAILY
Qty: 30 TABLET | Refills: 1 | Status: SHIPPED | OUTPATIENT
Start: 2022-02-03 | End: 2022-03-03 | Stop reason: SDUPTHER

## 2022-03-03 ENCOUNTER — HOSPITAL ENCOUNTER (OUTPATIENT)
Dept: BEHAVIORAL/MENTAL HEALTH | Age: 32
Discharge: HOME | End: 2022-03-03
Payer: MEDICAID

## 2022-03-03 VITALS
DIASTOLIC BLOOD PRESSURE: 70 MMHG | HEART RATE: 88 BPM | BODY MASS INDEX: 37.9 KG/M2 | SYSTOLIC BLOOD PRESSURE: 110 MMHG | WEIGHT: 226 LBS

## 2022-03-03 DIAGNOSIS — F41.9 ANXIETY: ICD-10-CM

## 2022-03-03 DIAGNOSIS — F41.1 GAD (GENERALIZED ANXIETY DISORDER): ICD-10-CM

## 2022-03-03 DIAGNOSIS — F33.41 RECURRENT MAJOR DEPRESSIVE DISORDER, IN PARTIAL REMISSION (HCC): ICD-10-CM

## 2022-03-03 PROCEDURE — 99213 OFFICE O/P EST LOW 20 MIN: CPT | Performed by: NURSE PRACTITIONER

## 2022-03-03 RX ORDER — ARIPIPRAZOLE 2 MG/1
2 TABLET ORAL DAILY
Qty: 90 TABLET | Refills: 2 | Status: SHIPPED | OUTPATIENT
Start: 2022-03-03

## 2022-03-03 RX ORDER — ZOLPIDEM TARTRATE 10 MG/1
10 TABLET ORAL
Qty: 30 TABLET | Refills: 5 | Status: SHIPPED | OUTPATIENT
Start: 2022-03-03 | End: 2022-10-07

## 2022-03-03 RX ORDER — DULOXETIN HYDROCHLORIDE 30 MG/1
30 CAPSULE, DELAYED RELEASE ORAL DAILY
Qty: 90 CAPSULE | Refills: 2 | Status: SHIPPED | OUTPATIENT
Start: 2022-03-03 | End: 2022-10-27 | Stop reason: SDUPTHER

## 2022-03-03 NOTE — BH NOTES
Name: Wandy Luevano    MRN:  [id number]   Time In: 9:45 AM     Time Out: 10:07 AM  Date: 3/3/2022           Collateral: none    Patient Identification: Wandy Luevano is a 36year old single mother of one son, Ofelia Bobo. She lives with her mother and son. She works part time as a . Progress Note: Moon Squires has been feeling good. Her motivation has improved. 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 She is getting more things done. She has noticed that if she skips a dose of aripiprazole, she feels less motivated and eats more. She is able to control her eating when she remembers to take it. She is not used to taking medication in the morning. Moon Squires has been sleeping well. She feels well rested and is not experiencing any daytime sleepiness. Moon Squires is looking forward to babysitting for her new niece. She is tolerating her medications and does not need any adjustments. Mental Status Examination    I. Reliability in Providing Information: Good (03/03/22 1006)    II. Personal Presentation: Looks stated age;Dresses appropriately (03/03/22 1006)    III. Motor Activity: Normal (03/03/22 1006)    IV. Speech Pattern: Normal rate;Normal rhythm (03/03/22 1006)    V. Mood: Euthymic (03/03/22 1006)    Vl. Eye Contact: Good (03/03/22 1006)    Vll. Affect: Full;Appropriate (03/03/22 1006)    VllI. Thought Processes        Thought Process: Organized;Goal directed (03/03/22 1006)        Thought Content: Unremarkable (03/03/22 1006)        Hallucinations: None (03/03/22 1006)        Delusions: None (03/03/22 1006)        Suicidal Ideation/Attempts: No (03/03/22 1006)                 Homicidal Ideation/Attempts: No (03/03/22 1006)        Obsessional and Compulsive Symptoms: Absent (03/03/22 1006)    IX.  Cognitive Functions       Orientation Level: Oriented x4 (03/03/22 1006)       Neurologic State: Alert (03/03/22 1006) Attention/Concentration: Attentive (03/03/22 1006)       Abstract Thinking: Intact (03/03/22 1006)       Estimate of Intelligence: Average (03/03/22 1006)       Judgment: Good (03/03/22 1006)       Insight: Good (03/03/22 1006)       Memory evaluation: No (03/03/22 1006)                                    X.Risks: None evident (03/03/22 1006)     XI. Strengths and Assets Inventory: Intelligence; Interests/Hobbies; Family Support;Employment History; Adequate living arrangements (03/03/22 1006)    VITALS:     Patient Vitals for the past 24 hrs:   Pulse BP   03/03/22 0954 88 110/70     Wt Readings from Last 3 Encounters:   03/03/22 102.5 kg (226 lb)   08/26/21 102.1 kg (225 lb)   06/24/21 101.2 kg (223 lb)     Temp Readings from Last 3 Encounters:   06/24/21 97 °F (36.1 °C) (Temporal)   05/12/21 97.3 °F (36.3 °C) (Core)   04/30/21 98.1 °F (36.7 °C) (Temporal)     BP Readings from Last 3 Encounters:   03/03/22 110/70   06/24/21 110/80   05/12/21 120/76     Pulse Readings from Last 3 Encounters:   03/03/22 88   06/24/21 (!) 101   05/12/21 (!) 110       Assessment: Ashlie Bardales is stable on her current medications. DSM 5 Diagnoses:     Patient Active Problem List    Diagnosis Date Noted    Insomnia disorder with non-sleep disorder mental comorbidity 10/03/2019    Recurrent major depressive disorder, in partial remission ([facility] [address] [facility] 05/09/2019    Gastrointestinal problem 04/25/2019    MOHAMUD (generalized anxiety disorder) 06/14/2018         Plan:   1. Continue Cymbalta 30 mg HS. 2.  Continue Ambien 5 mg HS. 3.  Continue aripiprazole 2 mg 1 po daily. Suggested ways to remember medication. 4.  Discussed options for follow up.

## 2022-03-19 PROBLEM — F33.41 RECURRENT MAJOR DEPRESSIVE DISORDER, IN PARTIAL REMISSION (HCC): Status: ACTIVE | Noted: 2019-05-09

## 2022-03-19 PROBLEM — F41.1 GAD (GENERALIZED ANXIETY DISORDER): Status: ACTIVE | Noted: 2018-06-14

## 2022-03-19 PROBLEM — G47.00 INSOMNIA DISORDER WITH NON-SLEEP DISORDER MENTAL COMORBIDITY: Status: ACTIVE | Noted: 2019-10-03

## 2022-03-19 PROBLEM — R19.8 GASTROINTESTINAL PROBLEM: Status: ACTIVE | Noted: 2019-04-25

## 2022-05-05 ENCOUNTER — VIRTUAL VISIT (OUTPATIENT)
Dept: FAMILY MEDICINE CLINIC | Age: 32
End: 2022-05-05
Payer: MEDICAID

## 2022-05-05 DIAGNOSIS — G47.00 INSOMNIA DISORDER WITH NON-SLEEP DISORDER MENTAL COMORBIDITY: ICD-10-CM

## 2022-05-05 DIAGNOSIS — F41.1 GAD (GENERALIZED ANXIETY DISORDER): ICD-10-CM

## 2022-05-05 DIAGNOSIS — F33.41 RECURRENT MAJOR DEPRESSIVE DISORDER, IN PARTIAL REMISSION (HCC): ICD-10-CM

## 2022-05-05 DIAGNOSIS — J30.1 SEASONAL ALLERGIC RHINITIS DUE TO POLLEN: Primary | ICD-10-CM

## 2022-05-05 PROCEDURE — 99443 PR PHYS/QHP TELEPHONE EVALUATION 21-30 MIN: CPT | Performed by: NURSE PRACTITIONER

## 2022-05-05 RX ORDER — MINOCYCLINE HYDROCHLORIDE 50 MG/1
50 TABLET ORAL 2 TIMES DAILY
Qty: 180 TABLET | Refills: 1 | Status: SHIPPED | OUTPATIENT
Start: 2022-05-05

## 2022-05-05 RX ORDER — LEVOCETIRIZINE DIHYDROCHLORIDE 5 MG/1
TABLET, FILM COATED ORAL
Qty: 30 TABLET | Refills: 2 | Status: SHIPPED | OUTPATIENT
Start: 2022-05-05 | End: 2022-07-28

## 2022-05-05 NOTE — PROGRESS NOTES
Chief Complaint   Patient presents with    Allergies     refill    Insomnia     1. \"Have you been to the ER, urgent care clinic since your last visit? Hospitalized since your last visit? \" no    2. \"Have you seen or consulted any other health care providers outside of the [address] [place] [address] since your last visit? \"  no    3. For patients aged 39-70: Has the patient had a colonoscopy / FIT/ Cologuard? NA      If the patient is female:    4. For patients aged 41-77: Has the patient had a mammogram within the past 2 years? NA      5. For patients aged 21-65: Has the patient had a pap smear? Yes, care Gap Present  Abuse Screening Questionnaire 5/5/2022   Do you ever feel afraid of your partner? N   Are you in a relationship with someone who physically or mentally threatens you? N   Is it safe for you to go home?  Y     3 most recent PHQ Screens 5/5/2022   PHQ Not Done -   Little interest or pleasure in doing things Not at all   Feeling down, depressed, irritable, or hopeless Not at all   Total Score PHQ 2 0

## 2022-05-06 NOTE — ACP (ADVANCE CARE PLANNING)
Discussed importance of advanced medical directives with patient. Patient is capable of making decisions.   Harry Millan NP-C

## 2022-05-06 NOTE — PROGRESS NOTES
Rosalia Prado is a 36 y.o. female, evaluated via audio-only technology on 5/5/2022 for Allergies (refill) and Insomnia  . Allergy medication effective requesting refill. Insomnia; She has no trouble falling asleep. She does have trouble staying asleep. She he endorses having a sleep study endorses prior to the pandemic. She would sleep 3 to 4 hours and then found herself falling asleep at the wheel. She is getting quality sleep with Ambien 6 to 8 hours . She is starting to wake up trying to breath and is concerned for sleep apnea. We discussed a new referral for sleep medicine. We also discussed coverage for depression medicine since Dru 58 NP retired her psych NP retired. Approved. . Depression is stable no thought of harming herself or other no suicide thoughts or plans. Assessment & Plan:   Diagnoses and all orders for this visit:    1. Seasonal allergic rhinitis due to pollen  -     levocetirizine (XYZAL) 5 mg tablet; TAKE 1 TABLET BY MOUTH ONCE DAILY AS NEEDED FOR ALLERGIES  Indications: inflammation of the nose due to an allergy    2. Insomnia disorder with non-sleep disorder mental comorbidity  -     SLEEP MEDICINE REFERRAL    3. MOHAMUD (generalized anxiety disorder)    4. Recurrent major [facility]    Other orders  -     minocycline (DYNACIN) 50 mg tablet; Take 1 Tablet by mouth two (2) times a day. The complexity of medical decision making for this visit is moderate         12  Subjective:       Prior to Admission medications    Medication Sig Start Date End Date Taking? Authorizing Provider   levocetirizine (XYZAL) 5 mg tablet TAKE 1 TABLET BY MOUTH ONCE DAILY AS NEEDED FOR ALLERGIES  Indications: inflammation of the nose due to an allergy 5/5/22  Yes Teresa Chavez NP   minocycline (DYNACIN) 50 mg tablet Take 1 Tablet by mouth two (2) times a day. 5/5/22  Yes Teresa Chavez NP   ARIPiprazole (ABILIFY) 2 mg tablet Take 1 Tablet by mouth daily.  3/3/22 Nancy Mclaughlin NP   DULoxetine (Cymbalta) 30 mg capsule Take 1 Capsule by mouth daily. 3/3/22   Nancy Mclaughlin NP   zolpidem (AMBIEN) 10 mg tablet Take 1 Tablet by mouth nightly as needed for Sleep. Max Daily Amount: 10 mg. May take 1/2 if full dose not needed. 3/3/22   Nancy Mclaughlin NP   omeprazole (PRILOSEC) 20 mg capsule Take 1 capsule by mouth once daily 3/2/22   Gretchen Hunter NP   Sudogest 60 mg tablet TAKE 1 TABLET BY MOUTH EVERY 6 HOURS AS NEEDED FOR  CONGESTION 11/3/21   Gretchen Hunter NP   azithromycin [facility]) 250 mg tablet Take 2 tablets today, then take 1 tablet daily 10/21/21   Gretchen Hunter NP   methylPREDNISolone (MEDROL DOSEPACK) 4 mg tablet Per dose pack instructions 10/21/21   Gretchen Hunter NP   ipratropium (ATROVENT) 42 mcg (0.06 %) nasal spray 1 [place] by Both Nostrils route four (4) times daily. Indications: runny nose 8/10/21   Haylee Carlisle MD   albuterol (PROVENTIL HFA, VENTOLIN HFA, PROAIR HFA) 90 mcg/actuation inhaler Take 2 Puffs by inhalation every four (4) hours as needed for Wheezing. Indications: asthma attack 7/7/21   Gretchen Hunter NP   norethindrone-e estradiol-iron (Junel Fe 24) 1 mg-20 mcg (24)/75 mg (4) tab Junel Fe 24 1 mg-20 mcg (24)/75 mg (4) tablet   Take 1 tablet by mouth once daily    Provider, Historical   montelukast (SINGULAIR) 10 mg tablet Take 10 mg by mouth At bedtime. Provider, Historical   APPLE CIDER VINEGAR PO Take  by mouth. Patient not taking: Reported on 6/24/2021    Provider, Historical   BIOTIN PO Take  by mouth. Patient not taking: Reported on 6/24/2021    Provider, Historical   dextromethorphan-guaiFENesin (DELSYM COUGH-CHEST CONGEST DM) 5-100 mg/5 mL liqd Take 10 mL by mouth every six to eight (6-8) hours as needed for Cough. Indications: cough  Patient not taking: Reported on 6/24/2021 5/29/19   Gretchen Hunter NP   dicyclomine (BENTYL) 10 mg capsule Take 1 Cap by mouth four (4) times daily.  Indications: colic  Patient not taking: Reported on 6/24/2021 4/4/19   Ann-Marie Pearson NP     Patient Active Problem List   Diagnosis Code    MOHAMUD (generalized anxiety disorder) F41.1    [facility] F33.41    Insomnia disorder with non-sleep disorder mental comorbidity G47.00    Gastrointestinal problem R19.8     Current Outpatient Medications   Medication Sig Dispense Refill    levocetirizine (XYZAL) 5 mg tablet TAKE 1 TABLET BY MOUTH ONCE DAILY AS NEEDED FOR ALLERGIES  Indications: inflammation of the nose due to an allergy 30 Tablet 2    minocycline (DYNACIN) 50 mg tablet Take 1 Tablet by mouth two (2) times a day. 180 Tablet 1    ARIPiprazole (ABILIFY) 2 mg tablet Take 1 Tablet by mouth daily. 90 Tablet 2    DULoxetine (Cymbalta) 30 mg capsule Take 1 Capsule by mouth daily. 90 Capsule 2    zolpidem (AMBIEN) 10 mg tablet Take 1 Tablet by mouth nightly as needed for Sleep. Max Daily Amount: 10 mg. May take 1/2 if full dose not needed. 30 Tablet 5    omeprazole (PRILOSEC) 20 mg capsule Take 1 capsule by mouth once daily 90 Capsule 0    Sudogest 60 mg tablet TAKE 1 TABLET BY MOUTH EVERY 6 HOURS AS NEEDED FOR  CONGESTION 40 Tablet 0    azithromycin (ZITHROMAX) 250 mg tablet Take 2 tablets today, then take 1 tablet daily 6 Tablet 0    methylPREDNISolone (MEDROL DOSEPACK) 4 mg tablet Per dose pack instructions 1 Dose Pack 0    ipratropium (ATROVENT) 42 mcg (0.06 %) nasal spray 1 [place] by Both Nostrils route four (4) times daily. Indications: runny nose 15 mL 0    albuterol (PROVENTIL HFA, VENTOLIN HFA, PROAIR HFA) 90 mcg/actuation inhaler Take 2 Puffs by inhalation every four (4) hours as needed for Wheezing. Indications: asthma attack 1 Inhaler 1    norethindrone-e estradiol-iron (Junel Fe 24) 1 mg-20 mcg (24)/75 mg (4) tab Junel Fe 24 1 mg-20 mcg (24)/75 mg (4) tablet   Take 1 tablet by mouth once daily      montelukast (SINGULAIR) 10 mg tablet Take 10 mg by mouth At bedtime.  APPLE CIDER VINEGAR PO Take  by mouth. (Patient not taking: Reported on 6/24/2021)      BIOTIN PO Take  by mouth. (Patient not taking: Reported on 6/24/2021)      dextromethorphan-guaiFENesin (DELSYM COUGH-CHEST CONGEST DM) 5-100 mg/5 mL liqd Take 10 mL by mouth every six to eight (6-8) hours as needed for Cough. Indications: cough (Patient not taking: Reported on 6/24/2021) 118 mL 1    dicyclomine (BENTYL) 10 mg capsule Take 1 Cap by mouth four (4) times daily. Indications: colic (Patient not taking: Reported on 6/24/2021) 180 Cap 1     Allergies   Allergen Reactions    Pcn [Penicillins] Other (comments)     Family hx of allergies to PCN. Past Medical History:   Diagnosis Date    ADD (attention deficit disorder)     Bronchial spasms     Celiac disease     Celiac disease 05/2019    Depression 5/9/2019    Fracture, ankle 06/14/2020    right    GERD (gastroesophageal reflux disease)     Herpes simplex virus (HSV) infection     Hx of herpes simplex infection 05/13/2009    lips    Panic      Past Surgical History:   Procedure Laterality Date    HX OTHER SURGICAL  05/2019    upper endoscopy with biopsy    HX OTHER SURGICAL  07/09/2021    surgery on right and left foot on toes.      Family History   Problem Relation Age of Onset    Asthma Sister     Migraines Maternal Grandmother     Heart Disease Paternal Grandmother     Lung Disease Paternal Grandmother     Hypertension Paternal Grandmother     Hypertension Paternal Grandfather     Depression Mother         postpartum    Anxiety Mother     Alcohol abuse Father     Drug Abuse Maternal Uncle     Cancer Maternal Grandfather         lungs     Social History     Tobacco Use    Smoking status: Former Smoker    Smokeless tobacco: Never Used   Substance Use Topics    Alcohol use: No       ROS  Pertinent items are noted in the HPI  Patient-Reported LMP: no periods       Stephanie Nickerson was evaluated through a patient-initiated, synchronous (real-time) audio only encounter. She (or guardian if applicable) is aware that it is a billable service, which includes applicable co-pays, with coverage as determined by her insurance carrier. This visit was conducted with the patient's (and/or Main[place] guardian's) verbal consent. She has not had a related appointment within my department in the past 7 days or scheduled within the next 24 hours. The patient was located in a state where the provider was licensed to provide care.     Total Time: minutes: 21-30 minutes    Jael Martin NP

## 2022-05-10 ENCOUNTER — TELEPHONE (OUTPATIENT)
Dept: SLEEP MEDICINE | Age: 32
End: 2022-05-10

## 2022-06-30 ENCOUNTER — OFFICE VISIT (OUTPATIENT)
Dept: SLEEP MEDICINE | Age: 32
End: 2022-06-30
Payer: MEDICAID

## 2022-06-30 ENCOUNTER — DOCUMENTATION ONLY (OUTPATIENT)
Dept: SLEEP MEDICINE | Age: 32
End: 2022-06-30

## 2022-06-30 VITALS
WEIGHT: 230 LBS | HEIGHT: 65 IN | TEMPERATURE: 97.8 F | HEART RATE: 97 BPM | OXYGEN SATURATION: 99 % | RESPIRATION RATE: 18 BRPM | DIASTOLIC BLOOD PRESSURE: 89 MMHG | BODY MASS INDEX: 38.32 KG/M2 | SYSTOLIC BLOOD PRESSURE: 128 MMHG

## 2022-06-30 DIAGNOSIS — G47.33 OBSTRUCTIVE SLEEP APNEA (ADULT) (PEDIATRIC): Primary | ICD-10-CM

## 2022-06-30 DIAGNOSIS — G47.00 INSOMNIA, UNSPECIFIED TYPE: ICD-10-CM

## 2022-06-30 PROCEDURE — 99204 OFFICE O/P NEW MOD 45 MIN: CPT | Performed by: INTERNAL MEDICINE

## 2022-06-30 NOTE — PATIENT INSTRUCTIONS
[address] [place]. [place], [address]  Tel.  [phone]  Fax. 100 [facility] 60  [place], [address] [facility]  Tel.  [phone]  Fax. [phone] [address]  Tel.  [phone]  Fax. [phone]     Sleep Apnea: After Your Visit  Your Care Instructions  Sleep apnea occurs when you frequently stop breathing for 10 seconds or longer during sleep. It can be mild to severe, based on the number of times per hour that you stop breathing or have slowed breathing. Blocked or narrowed airways in your nose, mouth, or throat can cause sleep apnea. Your airway can become blocked when your throat muscles and tongue relax during sleep. Sleep apnea is common, occurring in 1 out of 20 individuals. Individuals having any of the following characteristics should be evaluated and treated right away due to high risk and detrimental consequences from untreated sleep apnea:  1. Obesity  2. Congestive Heart failure  3. Atrial Fibrillation  4. Uncontrolled Hypertension  5. Type II Diabetes  6. Night-time Arrhythmias  7. Stroke  8. Pulmonary Hypertension  9. High-risk Driving Populations (pilots, truck drivers, etc.)  10. Patients Considering Weight-loss Surgery    How do you know you have sleep apnea? You probably have sleep apnea if you answer 'yes' to 3 or more of the following questions:  S - Have you been told that you Snore? T - Are you often Tired during the day? O - Has anyone Observed you stop breathing while sleeping? P- Do you have (or are being treated for) high blood Pressure? B - Are you obese (Body Mass Index > 35)? A - Is your Age 48years old or older? N - Is your Neck size greater than 16 inches? G - Are you male Gender? A sleep physician can prescribe a breathing device that prevents tissues in the throat from blocking your airway.  Or your doctor may recommend using a dental device (oral breathing device) to help keep your airway open. In some cases, surgery may be needed to remove enlarged tissues in the throat. Follow-up care is a key part of your treatment and safety. Be sure to make and go to all appointments, and call your doctor if you are having problems. It's also a good idea to know your test results and keep a list of the medicines you take. How can you care for yourself at home? · Lose weight, if needed. It may reduce the number of times you stop breathing or have slowed breathing. · Go to bed at the same time every night. · Sleep on your side. It may stop mild apnea. If you tend to roll onto your back, sew a pocket in the back of your pajama top. Put a tennis ball into the pocket, and stitch the pocket shut. This will help keep you from sleeping on your back. · Avoid alcohol and medicines such as sleeping pills and sedatives before bed. · Do not smoke. Smoking can make sleep apnea worse. If you need help quitting, talk to your doctor about stop-smoking programs and medicines. These can increase your chances of quitting for good. · Prop up the head of your bed 4 to 6 inches by putting bricks under the legs of the bed. · Treat breathing problems, such as a stuffy nose, caused by a cold or allergies. · Use a continuous positive airway pressure (CPAP) breathing machine if lifestyle changes do not help your apnea and your doctor recommends it. The machine keeps your airway from closing when you sleep. · If CPAP does not help you, ask your doctor whether you should try other breathing machines. A bilevel positive airway pressure machine has two types of air pressureâone for breathing in and one for breathing out. Another device raises or lowers air pressure as needed while you breathe. · If your nose feels dry or bleeds when using one of these machines, talk with your doctor about increasing moisture in the air. A humidifier may help.   · If your nose is runny or stuffy from using a breathing machine, talk with your doctor about using decongestants or a corticosteroid nasal spray. When should you call for help? Watch closely for changes in your health, and be sure to contact your doctor if:  · You still have sleep apnea even though you have made lifestyle changes. · You are thinking of trying a device such as CPAP. · You are having problems using a CPAP or similar machine. Where can you learn more? Go to [employer]. Enter D517 in the search box to learn more about \"Sleep Apnea: After Your Visit. \"   © 6764-8597 Healthwise, Incorporated. Care instructions adapted under license by [employer] (which disclaims liability or warranty for this information). This care instruction is for use with your licensed healthcare professional. If you have questions about a medical condition or this instruction, always ask your healthcare professional. Sai Hayes any warranty or liability for your use of this information. PROPER SLEEP HYGIENE    What to avoid  · Do not have drinks with caffeine, such as coffee or black tea, for 8 hours before bed. · Do not smoke or use other types of tobacco near bedtime. Nicotine is a stimulant and can keep you awake. · Avoid drinking alcohol late in the evening, because it can cause you to wake in the middle of the night. · Do not eat a big meal close to bedtime. If you are hungry, eat a light snack. · Do not drink a lot of water close to bedtime, because the need to urinate may wake you up during the night. · Do not read or watch TV in bed. Use the bed only for sleeping and sexual activity. What to try  · Go to bed at the same time every night, and wake up at the same time every morning. Do not take naps during the day. · Keep your bedroom quiet, dark, and cool. · Get regular exercise, but not within 3 to 4 hours of your bedtime. .  · Sleep on a comfortable pillow and mattress.   · If watching the clock makes you anxious, turn it facing away from you so you cannot see the time. · If you worry when you lie down, start a worry book. Well before bedtime, write down your worries, and then set the book and your concerns aside. · Try meditation or other relaxation techniques before you go to bed. · If you cannot fall asleep, get up and go to another room until you feel sleepy. Do something relaxing. Repeat your bedtime routine before you go to bed again. · Make your house quiet and calm about an hour before bedtime. Turn down the lights, turn off the TV, log off the computer, and turn down the volume on music. This can help you relax after a busy day. Drowsy Driving  The [address] [place] [address] Road Traffic Safety Administration cites drowsiness as a causing factor in more than 828,118 police reported crashes annually, resulting in 76,000 injuries and 1,500 deaths. Other surveys suggest 55% of people polled have driven while drowsy in the past year, 23% had fallen asleep but not crashed, 3% crashed, and 2% had and accident due to drowsy driving. Who is at risk? Young Drivers: One study of drowsy driving accidents states that 55% of the drivers were under 25 years. Of those, 75% were male. Shift Workers and Travelers: People who work overnight or travel across time zones frequently are at higher risk of experiencing Circadian Rhythm Disorders. They are trying to work and function when their body is programed to sleep. Sleep Deprived: Lack of sleep has a serious impact on your ability to pay attention or focus on a task. Consistently getting less than the average of 8 hours your body needs creates partial or cumulative sleep deprivation. Untreated Sleep Disorders: Sleep Apnea, Narcolepsy, R.L.S., and other sleep disorders (untreated) prevent a person from getting enough restful sleep. This leads to excessive daytime sleepiness and increases the risk for drowsy driving accidents by up to 7 times.   Medications / Alcohol: Even over the counter medications can cause drowsiness. Medications that impair a drivers attention should have a warning label. Alcohol naturally makes you sleepy and on its own can cause accidents. Combined with excessive drowsiness its effects are amplified. Signs of Drowsy Driving:   * You don't remember driving the last few miles   * You may drift out of your jan   * You are unable to focus and your thoughts wander   * You may yawn more often than normal   * You have difficulty keeping your eyes open / nodding off   * Missing traffic signs, speeding, or tailgating  Prevention-   Good sleep hygiene, lifestyle and behavioral choices have the most impact on drowsy driving. There is no substitute for sleep and the average person requires 8 hours nightly. If you find yourself driving drowsy, stop and sleep. Consider the sleep hygiene tips provided during your visit as well. Medication Refill Policy: Refills for all medications require 1 week advance notice. Please have your pharmacy fax a refill request. We are unable to fax, or call in \"controled substance\" medications and you will need to pick these prescriptions up from our office. NeuroPace Activation    Thank you for requesting access to [employer]. Please follow the instructions below to securely access and download your online medical record. NeuroPace allows you to send messages to your doctor, view your test results, renew your prescriptions, schedule appointments, and more. How Do I Sign Up? 1. In your internet browser, go to [email or web address]"[email or web address]. [email or web address]. 2. Click on the First Time User? Click Here link in the Sign In box. You will see the New Member Sign Up page. 3. Enter your [employer]Pace Access Code exactly as it appears below. You will not need to use this code after youve completed the sign-up process. If you do not sign up before the expiration date, you must request a new code. [employer]Pace Access Code:  Activation code not generated  Current [employer]Pace Status: Active (This is the date your [employer] access code will )    4. Enter the last four digits of your Social Security Number (xxxx) and Date of Birth (mm/dd/yyyy) as indicated and click Submit. You will be taken to the next sign-up page. 5. Create a [employer] ID. This will be your [employer] login ID and cannot be changed, so think of one that is secure and easy to remember. 6. Create a [employer] password. You can change your password at any time. 7. Enter your Password Reset Question and Answer. This can be used at a later time if you forget your password. 8. Enter your e-mail address. You will receive e-mail notification when new information is available in [address] 9. Click Sign Up. You can now view and download portions of your medical record. 10. Click the Download Summary menu link to download a portable copy of your medical information. Additional Information    If you have questions, please call [phone]. Remember, [employer] is NOT to be used for urgent needs. For medical emergencies, dial 911.

## 2022-06-30 NOTE — PROGRESS NOTES
[address] [facility] [place]. [place], [address]  Tel.  [phone]  Fax. 100 [facility] 60  [place], [address] [facility]  Tel.  [phone]  Fax. [phone] [address]  Tel.  [phone]  Fax. [phone]         Subjective:      Jesenia Hatfield is an 29 y.o. female referred for evaluation for a sleep disorder. She complains of snoring, choking associated with excessive daytime sleepiness, frequent night time awakenings. Symptoms began a few years ago, gradually worsening since that time. She usually can fall asleep in 45 minutes. However, prior to starting [place], she had difficulty going to sleep and was only sleeping 2-3 hours. Family or house members note snoring. She denies falling asleep while during conversation. She does get drowsy when driving over 30 minutes. Jesenia Hatfield does wake up frequently at night. She is bothered by waking up too early and left unable to get back to sleep. She actually sleeps about 7 hours at night and wakes up about 3 times during the night. She does not work shifts: Antonio Nieves indicates she does not get too little sleep at night. Her bedtime is 1000. She awakens at 0600. She does take naps. She takes 2 naps a week lasting 1,2. She has the following observed behaviors: Loud snoring,Light snoring,Pauses in breathing,Grinding teeth; Nightmares (waking with a gasp or snort, vivid dreams ). Other remarks:   she does look at the clock when she wakes up at night  She works in a [employer] Sleepiness Score: 6      Allergies   Allergen Reactions    Pcn [Penicillins] Other (comments)     Family hx of allergies to PCN.          Current Outpatient Medications:     omeprazole (PRILOSEC) 20 mg capsule, Take 1 capsule by mouth once daily, Disp: 90 Capsule, Rfl: 0    levocetirizine (XYZAL) 5 mg tablet, TAKE 1 TABLET BY MOUTH ONCE DAILY AS NEEDED FOR ALLERGIES  Indications: inflammation of the nose due to an allergy, Disp: 30 Tablet, Rfl: 2    minocycline (DYNACIN) 50 mg tablet, Take 1 Tablet by mouth two (2) times a day., Disp: 180 Tablet, Rfl: 1    ARIPiprazole (ABILIFY) 2 mg tablet, Take 1 Tablet by mouth daily. , Disp: 90 Tablet, Rfl: 2    DULoxetine (Cymbalta) 30 mg capsule, Take 1 Capsule by mouth daily. , Disp: 90 Capsule, Rfl: 2    zolpidem (AMBIEN) 10 mg tablet, Take 1 Tablet by mouth nightly as needed for Sleep. Max Daily Amount: 10 mg. May take 1/2 if full dose not needed. , Disp: 30 Tablet, Rfl: 5    Sudogest 60 mg tablet, TAKE 1 TABLET BY MOUTH EVERY 6 HOURS AS NEEDED FOR  CONGESTION, Disp: 40 Tablet, Rfl: 0    azithromycin (ZITHROMAX) 250 mg tablet, Take 2 tablets today, then take 1 tablet daily, Disp: 6 Tablet, Rfl: 0    methylPREDNISolone (MEDROL DOSEPACK) 4 mg tablet, Per dose pack instructions, Disp: 1 Dose Pack, Rfl: 0    ipratropium (ATROVENT) 42 mcg (0.06 %) nasal spray, 1 [place] by Both Nostrils route four (4) times daily. Indications: runny nose, Disp: 15 mL, Rfl: 0    albuterol (PROVENTIL HFA, VENTOLIN HFA, PROAIR HFA) 90 mcg/actuation inhaler, Take 2 Puffs by inhalation every four (4) hours as needed for Wheezing. Indications: asthma attack, Disp: 1 Inhaler, Rfl: 1    norethindrone-e estradiol-iron (Junel Fe 24) 1 mg-20 mcg (24)/75 mg (4) tab, Junel Fe 24 1 mg-20 mcg (24)/75 mg (4) tablet  Take 1 tablet by mouth once daily, Disp: , Rfl:     montelukast (SINGULAIR) 10 mg tablet, Take 10 mg by mouth At bedtime. , Disp: , Rfl:     APPLE CIDER VINEGAR PO, Take  by mouth., Disp: , Rfl:     BIOTIN PO, Take  by mouth., Disp: , Rfl:     dextromethorphan-guaiFENesin (DELSYM COUGH-CHEST CONGEST DM) 5-100 mg/5 mL liqd, Take 10 mL by mouth every six to eight (6-8) hours as needed for Cough. Indications: cough, Disp: 118 mL, Rfl: 1    dicyclomine (BENTYL) 10 mg capsule, Take 1 Cap by mouth four (4) times daily.  Indications: colic, Disp: 873 Cap, Rfl: 1     She  has a past medical history of ADD (attention deficit disorder), Bronchial spasms, Celiac disease, Celiac disease (05/2019), Depression (5/9/2019), Fracture, ankle (06/14/2020), GERD (gastroesophageal reflux disease), Herpes simplex virus (HSV) infection, herpes simplex infection (05/13/2009), and Panic. She has no past medical history of Abnormal Papanicolaou smear of cervix, Abuse, Anemia NEC, Arrhythmia, Arthritis, Asthma, Autoimmune disease ([address] Breast disorder, CAD (coronary artery disease), Calculus of kidney, Cancer ([address] Chlamydia, Chronic kidney disease, Chronic pain, Complication of anesthesia, Congestive heart failure, unspecified, Contact dermatitis and other eczema, due to unspecified cause, COPD, Diabetes ([address] Epilepsy ([address] Genital herpes, Gonorrhea, Headache(784.0), Herpes gestationis, Human immunodeficiency virus (HIV) disease ([address] Hypercholesterolemia, Hypertension, Infertility, female, Liver disease, Phlebitis and thrombophlebitis, Pituitary disorder ([address] Polycystic disease, ovaries, Postpartum depression, Psychotic disorder ([address] PUD (peptic ulcer disease), Rhesus isoimmunization affecting pregnancy, Seizures ([address] Sickle cell disease ([address] Stroke ([address] Syphilis, Systemic lupus erythematosus ([address] Thromboembolus ([address] Thyroid disease, or Trauma. She  has a past surgical history that includes hx other surgical (05/2019) and hx other surgical (07/09/2021). She family history includes Alcohol abuse in her father; Anxiety in her mother; Asthma in her sister; Cancer in her maternal grandfather; Depression in her mother; Drug Abuse in her maternal uncle; Heart Disease in her paternal grandmother; Hypertension in her paternal grandfather and paternal grandmother; Lung Disease in her paternal grandmother; Migraines in her maternal grandmother. She  reports that she has quit smoking. She has never used smokeless tobacco. She reports that she does not drink alcohol and does not use drugs.      Review of Systems:  Constitutional:  100 pound weight gain. Eyes:  No blurred vision. CVS:  No significant chest pain  Pulm:  No significant shortness of breath unless with exertion  GI:  No significant nausea or vomiting, occasional GERD symptoms but they are controlled  :  No significant nocturia  Musculoskeletal:  No significant joint pain at night  Skin:  No significant rashes  Neuro:  No significant dizziness   Psych:  Treated for anxiety    Sleep Review of Systems: notable for previous difficulty falling asleep; +frequent awakenings at night;  regular dreaming noted; no nightmares ; + early morning headaches; mild memory problems; no concentration issues       Objective:     Visit Vitals  /89 (BP 1 Location: Right arm, BP Patient Position: Sitting, BP Cuff Size: Adult long)   Pulse 97   Temp 97.8 °F (36.6 °C) (Temporal)   Resp 18   Ht 5' 4.75\" (1.645 m)   Wt 230 lb (104.3 kg)   SpO2 99%   BMI 38.57 kg/m²         General:   Not in acute distress   Eyes:  Anicteric sclerae, no obvious strabismus   Nose:  No obvious nasal septum deviation    Oropharynx:   Class 3 oropharyngeal outlet, thick tongue base, elongated uvula, low-lying soft palate, narrow tonsilo-pharyngeal pilars   Tonsils:   tonsils are present and normal   Neck:    ; midline trachea   Chest/Lungs:  Equal lung expansion, clear on auscultation    CVS:  Normal rate, regular rhythm; no JVD   Skin:  Warm to touch; no obvious rashes   Neuro:  No focal deficits ; no obvious tremor    Psych:  Normal affect,  normal countenance;          Assessment:       ICD-10-CM ICD-9-CM    1. Obstructive sleep apnea (adult) (pediatric)  G47.33 327.23 SLEEP STUDY UNATTENDED, 4 CHANNEL   2. Insomnia, unspecified type  G47.00 780.52          Plan:     * The patient currently has a Moderate Risk for having sleep apnea. STOP-BANG score 3.  * HSAT was ordered for initial evaluation. Treatment options for sleep apnea were reviewed.    she would like to proceed with a trial of PAP if found to have significant apnea. * She was provided information on sleep apnea including coresponding risk factors and the importance of proper treatment. * Counseling was provided regarding proper sleep hygiene and safe driving. 2. Insomnia - I have advised a regular sleep wake cycle. she  was advised to avoid looking at the clock during the night. Ideally, the clock face should be turned away. she was advised to minimize caffeine use and to avoid caffeine-containing beverages 8 hours prior to bedtime. Naps were discouraged. A regular exercise schedule, at least 3 hours before bedtime, would be beneficial to improving sleep quality. she was advised to avoid evening light and to use sunglasses in the late afternoon. Watching TV, using laptops, tablets and smartphones in the evening was discouraged. she  was advised to keep the bedroom cool and dark. Pets should not be allowed to sleep in the bed. Relaxation strategies and realistic expectations for sleep were reviewed today. She was advised to reduce ambien to 5 mg and take only if needed. 5 mg is the maximum recommended dosage for women. 3. Obesity - I have counseled the patient regarding the benefits of weight loss. The treatment plan was reviewed with the patient in detail . she understands that the lead technologist will be calling her  with the results or notifying of results via [address] [place] [address] and assisting with the next step in the treatment plan as outlined today during the consultation with me. All of her questions were addressed. All of her questions were addressed. Thank you for allowing us to participate in your patient's medical care. We'll keep you updated on these investigations.   Electronically signed by    Tda Wise MD  Diplomate in Sleep Medicine  [facility]    6/30/2022

## 2022-06-30 NOTE — Clinical Note
Thank you for the referral.  I will keep you informed of her progress.   [address],  Julien Brown To get better and follow your care plan as instructed.

## 2022-07-06 ENCOUNTER — NURSE TRIAGE (OUTPATIENT)
Dept: OTHER | Facility: CLINIC | Age: 32
End: 2022-07-06

## 2022-07-06 ENCOUNTER — TELEPHONE (OUTPATIENT)
Dept: FAMILY MEDICINE CLINIC | Age: 32
End: 2022-07-06

## 2022-07-06 NOTE — TELEPHONE ENCOUNTER
PC from patient, she had stated with the RN triage nurse. She started on yesterday with some chest tightness which caused her to have a non  productive nagging cough. She was informed, per April Gregory after reading over her notes from the triage nurse, she can be either seen this afternoon or tomorrow for a VV. Per Ms Jarret Piedra, she does not feel as though she has that type of a cough that it would need to be a VV, but more of a breathing problem that is causing the cough from the tightness in her chest.  She has used her inhaler, but is not helping, so is wanting to come in the office to have a nebulizer treatment done, so does not feel as though the VV will benefit her without being physically examined. She was informed, per April Gregory, since the Covid, we have not been able to do the neb treatment and has not been given the green light to start back. She would benefit more by going to the ER or UC if she thinks that she is needing a neb treatment. Ms Jarret Piedra was a little up set, stated she has made 3 PC's, been on hold 20 minutes for just trying to get an appointment to be seen. She has a flight scheduled for this coming Friday and really needs to get this cough and chest tightness under control. I stated to her, I am sorry we were not able to help her in that way, but hopes that she gets help and feel better soon. She stated OK and thanks for trying.

## 2022-07-06 NOTE — TELEPHONE ENCOUNTER
Received call from [place] at [facility] with Red Flag Complaint. Subjective: Caller states \"yesterday I had this tightness that created a coughing fit\"     Current Symptoms: tightness in chest, non productive cough    Speaking in complete sentences, speech is clear    Onset: 1 day ago;       Pain Severity: 3/10;  intermittent    Temperature: denies     Denies -  Heart palpitations / blue or gray lips / vomiting / nasal drainage / shoulder or jaw pain / dizziness    What has been tried: inhaler      Recommended disposition: Go to ED/UCC Now (Or to Office with PCP Approval)  2nd level triage completed by calling [facility] practice. Wade Brown answered and accepted patient call. Care advice provided, patient verbalizes understanding; denies any other questions or concerns; instructed to call back for any new or worsening symptoms. Patient/caller agrees to follow-up with PCP     Attention Provider: Thank you for allowing me to participate in the care of your patient. The patient was connected to triage in response to information provided to the ECC. Please do not respond through this encounter as the response is not directed to a shared pool.         Reason for Disposition   Chest pain lasting longer than 5 minutes and occurred in last 3 days (72 hours) (Exception: feels exactly the same as previously diagnosed heartburn and has accompanying sour taste in mouth)    Protocols used: CHEST PAIN-ADULT-OH

## 2022-07-27 DIAGNOSIS — J30.1 SEASONAL ALLERGIC RHINITIS DUE TO POLLEN: ICD-10-CM

## 2022-07-28 ENCOUNTER — HOSPITAL ENCOUNTER (OUTPATIENT)
Dept: SLEEP MEDICINE | Age: 32
Discharge: HOME | End: 2022-07-28
Payer: MEDICAID

## 2022-07-28 PROCEDURE — 95806 SLEEP STUDY UNATT&RESP EFFT: CPT | Performed by: INTERNAL MEDICINE

## 2022-07-28 RX ORDER — LEVOCETIRIZINE DIHYDROCHLORIDE 5 MG/1
TABLET, FILM COATED ORAL
Qty: 30 TABLET | Refills: 0 | Status: SHIPPED | OUTPATIENT
Start: 2022-07-28 | End: 2022-08-28

## 2022-08-01 ENCOUNTER — TELEPHONE (OUTPATIENT)
Dept: SLEEP MEDICINE | Age: 32
End: 2022-08-01

## 2022-08-01 DIAGNOSIS — G47.33 OBSTRUCTIVE SLEEP APNEA (ADULT) (PEDIATRIC): Primary | ICD-10-CM

## 2022-08-03 ENCOUNTER — DOCUMENTATION ONLY (OUTPATIENT)
Dept: SLEEP MEDICINE | Age: 32
End: 2022-08-03

## 2022-08-03 NOTE — TELEPHONE ENCOUNTER
Results of sleep study in [facility]  Lead tech to convey results to patient  HSAT results in [facility]. Test positive for significant sleep apnea. AHI 14/hour and lowest oxygen saturation was 84%. We had discussed treatment options at initial consultation. Based on the results of the home sleep apnea test, I believe a trial of APAP would be an effective mode of therapy. APAP order attached. she should be seen in the sleep disorder center 4-6 weeks after initiating PAP therapy. Patient should call the office the day she gets set up with new PAP device so we can schedule her for an adherence/compliance visit within 31-90 days of obtaining a new device. Front staff to Order PAP and call patient and let them know which DME company they should be hearing from after results reviewed with lead support technologist.     she will need a first adherence visit.

## 2022-08-05 ENCOUNTER — TELEPHONE (OUTPATIENT)
Dept: SLEEP MEDICINE | Age: 32
End: 2022-08-05

## 2022-08-09 ENCOUNTER — TELEPHONE (OUTPATIENT)
Dept: FAMILY MEDICINE CLINIC | Age: 32
End: 2022-08-09

## 2022-08-09 ENCOUNTER — DOCUMENTATION ONLY (OUTPATIENT)
Dept: SLEEP MEDICINE | Age: 32
End: 2022-08-09

## 2022-08-09 NOTE — PROGRESS NOTES
Patient called and asked for sleep study results and for us to fax over results to Dr. Susana Pavon. [facility] was informed about SS results and results faxed to Dr. Kacie Trinh 08/09/22 at 3:30 pm, [phone].

## 2022-08-10 ENCOUNTER — DOCUMENTATION ONLY (OUTPATIENT)
Dept: SLEEP MEDICINE | Age: 32
End: 2022-08-10

## 2022-08-26 DIAGNOSIS — J30.1 SEASONAL ALLERGIC RHINITIS DUE TO POLLEN: ICD-10-CM

## 2022-08-28 RX ORDER — LEVOCETIRIZINE DIHYDROCHLORIDE 5 MG/1
TABLET, FILM COATED ORAL
Qty: 30 TABLET | Refills: 0 | Status: SHIPPED | OUTPATIENT
Start: 2022-08-28 | End: 2022-09-29

## 2022-09-03 DIAGNOSIS — J00 ACUTE NASOPHARYNGITIS: ICD-10-CM

## 2022-09-06 RX ORDER — IPRATROPIUM BROMIDE 42 UG/1
1 SPRAY, METERED NASAL 4 TIMES DAILY
Qty: 15 ML | Refills: 0 | Status: SHIPPED | OUTPATIENT
Start: 2022-09-06

## 2022-09-27 DIAGNOSIS — J30.1 SEASONAL ALLERGIC RHINITIS DUE TO POLLEN: ICD-10-CM

## 2022-09-29 RX ORDER — LEVOCETIRIZINE DIHYDROCHLORIDE 5 MG/1
TABLET, FILM COATED ORAL
Qty: 30 TABLET | Refills: 0 | Status: SHIPPED | OUTPATIENT
Start: 2022-09-29 | End: 2022-10-27 | Stop reason: SDUPTHER

## 2022-10-06 ENCOUNTER — PATIENT MESSAGE (OUTPATIENT)
Dept: FAMILY MEDICINE CLINIC | Age: 32
End: 2022-10-06

## 2022-10-06 DIAGNOSIS — F41.9 ANXIETY: ICD-10-CM

## 2022-10-07 RX ORDER — ZOLPIDEM TARTRATE 10 MG/1
10 TABLET ORAL
Qty: 30 TABLET | Refills: 0 | Status: SHIPPED | OUTPATIENT
Start: 2022-10-07

## 2022-10-19 ENCOUNTER — NURSE TRIAGE (OUTPATIENT)
Dept: OTHER | Facility: CLINIC | Age: 32
End: 2022-10-19

## 2022-10-19 NOTE — TELEPHONE ENCOUNTER
Location of patient: [address] [place] from [place] at [facility] with [employer]. Appointment already scheduled for today with PCP at 4:20pm    Subjective: Caller states \"For the last couple of weeks I have been having weird dizzy spells\"     Current Symptoms: intermittent dizziness - episodes last \"a couple of minutes\"    States had blurred vision on 10/9/22 lasting 45 minutes - no blurred vision since    Speech is clear    Onset: 2 weeks ago;     Denies - fainting / heart palpitations / stiff neck /  numbness or weakness on one side of the body / bloody black or tarry stool / vomitting    Pain Severity: 0/10; Temperature: denies     What has been tried: drinking water    LPregnant: No    Recommended disposition: See in Office Today    Care advice provided, patient verbalizes understanding; denies any other questions or concerns; instructed to call back for any new or worsening symptoms. Patient/caller agrees to follow-up with PCP     Patient requesting and earlier appointment for today, warm transfer to [facility] with [facility] for appointment scheduling. Attention Provider: Thank you for allowing me to participate in the care of your patient. The patient was connected to triage in response to information provided to the [facility]. Please do not respond through this encounter as the response is not directed to a shared pool.         Reason for Disposition   Patient wants to be seen    Protocols used: Dizziness-ADULT-[place]

## 2022-10-26 ENCOUNTER — OFFICE VISIT (OUTPATIENT)
Dept: FAMILY MEDICINE CLINIC | Age: 32
End: 2022-10-26
Payer: MEDICAID

## 2022-10-26 VITALS
WEIGHT: 240.4 LBS | BODY MASS INDEX: 40.05 KG/M2 | RESPIRATION RATE: 18 BRPM | TEMPERATURE: 97.2 F | HEART RATE: 108 BPM | DIASTOLIC BLOOD PRESSURE: 88 MMHG | HEIGHT: 65 IN | SYSTOLIC BLOOD PRESSURE: 130 MMHG | OXYGEN SATURATION: 98 %

## 2022-10-26 DIAGNOSIS — Z13.1 SCREENING FOR DIABETES MELLITUS (DM): ICD-10-CM

## 2022-10-26 DIAGNOSIS — R53.83 FATIGUE, UNSPECIFIED TYPE: ICD-10-CM

## 2022-10-26 DIAGNOSIS — B37.9 CANDIDA INFECTION: ICD-10-CM

## 2022-10-26 DIAGNOSIS — R74.8 ELEVATED LIVER ENZYMES: Primary | ICD-10-CM

## 2022-10-26 PROCEDURE — 99214 OFFICE O/P EST MOD 30 MIN: CPT | Performed by: NURSE PRACTITIONER

## 2022-10-26 RX ORDER — FLUCONAZOLE 150 MG/1
150 TABLET ORAL DAILY
Qty: 1 TABLET | Refills: 0 | Status: SHIPPED | OUTPATIENT
Start: 2022-10-26 | End: 2022-10-27

## 2022-10-26 RX ORDER — MECLIZINE HYDROCHLORIDE 25 MG/1
TABLET ORAL
COMMUNITY
Start: 2022-10-19 | End: 2022-11-16

## 2022-10-26 NOTE — PROGRESS NOTES
Chief Complaint   Patient presents with    Dizziness     Followup  [facility] for vertigo    1. \"Have you been to the ER, urgent care clinic since your last visit? Hospitalized since your last visit? \" No    2. \"Have you seen or consulted any other health care providers outside of the [address] [place] [address] since your last visit? \"  Yes [facility]- for vertigo  3. For patients aged 39-70: Has the patient had a colonoscopy / FIT/ Cologuard? NA - based on age      If the patient is female:    4. For patients aged 41-77: Has the patient had a mammogram within the past 2 years? NA - based on age or sex      11. For patients aged 21-65: Has the patient had a pap smear? Yes - Care Gap present.  Most recent result on file

## 2022-10-27 ENCOUNTER — PATIENT MESSAGE (OUTPATIENT)
Dept: FAMILY MEDICINE CLINIC | Age: 32
End: 2022-10-27

## 2022-10-27 DIAGNOSIS — J30.1 SEASONAL ALLERGIC RHINITIS DUE TO POLLEN: ICD-10-CM

## 2022-10-27 LAB
ALBUMIN SERPL-MCNC: 4.6 G/DL (ref 3.9–5)
ALP SERPL-CCNC: 102 IU/L (ref 44–121)
ALT SERPL-CCNC: 149 IU/L (ref 0–32)
AST SERPL-CCNC: 208 IU/L (ref 0–40)
BASOPHILS # BLD AUTO: 0.1 X10E3/UL (ref 0–0.2)
BASOPHILS NFR BLD AUTO: 1 %
BILIRUB DIRECT SERPL-MCNC: 0.19 MG/DL (ref 0–0.4)
BILIRUB SERPL-MCNC: 0.2 MG/DL (ref 0–1.2)
BUN SERPL-MCNC: 9 MG/DL (ref 6–20)
BUN/CREAT SERPL: 13 (ref 9–23)
CALCIUM SERPL-MCNC: 9.4 MG/DL (ref 8.7–10.2)
CHLORIDE SERPL-SCNC: 98 MMOL/L (ref 96–106)
CO2 SERPL-SCNC: 18 MMOL/L (ref 20–29)
CREAT SERPL-MCNC: 0.69 MG/DL (ref 0.57–1)
EGFR: 120 ML/MIN/1.73
EOSINOPHIL # BLD AUTO: 0.4 X10E3/UL (ref 0–0.4)
EOSINOPHIL NFR BLD AUTO: 4 %
ERYTHROCYTE [DISTWIDTH] IN BLOOD BY AUTOMATED COUNT: 12.5 % (ref 11.7–15.4)
EST. AVERAGE GLUCOSE BLD GHB EST-MCNC: 143 MG/DL
GLUCOSE SERPL-MCNC: 195 MG/DL (ref 70–99)
HBA1C MFR BLD: 6.6 % (ref 4.8–5.6)
HCT VFR BLD AUTO: 38 % (ref 34–46.6)
HGB BLD-MCNC: 12.4 G/DL (ref 11.1–15.9)
IMM GRANULOCYTES # BLD AUTO: 0.1 X10E3/UL (ref 0–0.1)
IMM GRANULOCYTES NFR BLD AUTO: 1 %
LYMPHOCYTES # BLD AUTO: 3.1 X10E3/UL (ref 0.7–3.1)
LYMPHOCYTES NFR BLD AUTO: 30 %
MCH RBC QN AUTO: 29.1 PG (ref 26.6–33)
MCHC RBC AUTO-ENTMCNC: 32.6 G/DL (ref 31.5–35.7)
MCV RBC AUTO: 89 FL (ref 79–97)
MONOCYTES # BLD AUTO: 0.5 X10E3/UL (ref 0.1–0.9)
MONOCYTES NFR BLD AUTO: 5 %
NEUTROPHILS # BLD AUTO: 6 X10E3/UL (ref 1.4–7)
NEUTROPHILS NFR BLD AUTO: 59 %
PHOSPHATE SERPL-MCNC: 3.5 MG/DL (ref 3–4.3)
PLATELET # BLD AUTO: 478 X10E3/UL (ref 150–450)
POTASSIUM SERPL-SCNC: 4 MMOL/L (ref 3.5–5.2)
PROT SERPL-MCNC: 7.6 G/DL (ref 6–8.5)
RBC # BLD AUTO: 4.26 X10E6/UL (ref 3.77–5.28)
SODIUM SERPL-SCNC: 136 MMOL/L (ref 134–144)
WBC # BLD AUTO: 10.1 X10E3/UL (ref 3.4–10.8)

## 2022-10-27 RX ORDER — DULOXETINE HYDROCHLORIDE 30 MG/1
30 CAPSULE, DELAYED RELEASE ORAL DAILY
Qty: 90 CAPSULE | Refills: 2 | Status: SHIPPED | OUTPATIENT
Start: 2022-10-27

## 2022-10-27 NOTE — TELEPHONE ENCOUNTER
----- Message from Rolanda Valencia sent at 10/27/2022 10:20 AM EDT -----  Regarding: Lab results   Will someone have a chance to call me or email me about the lab results today?

## 2022-10-27 NOTE — PROGRESS NOTES
Subjective:     Marty Moore is a 29 y.o. female who presents today with the following:  Chief Complaint   Patient presents with    Dizziness     Followup  [facility] for vertigo       Patient Active Problem List   Diagnosis Code    MOHAMUD (generalized anxiety disorder) F41.1    Recurrent major depressive disorder, in partial remission ([facility] [address] F33.41    Insomnia disorder with non-sleep disorder mental comorbidity G47.00    Gastrointestinal problem R19.8         COMPLIANT WITH MEDICATION:   Denies chest pain, dyspnea, palpitations, headache and blurred vision. Blood pressure normotensive. Recently seen at [facility] for vertigo. We discussed a significant family hx  (her grandmother being unable to tolerate generic medications. Ms. Jan De Luna noted the vertigo started happening when the Duloxetine was changed from Brand to generic . We discussed switching to brand name only to see if the vertigo would diminsh. She is unable to tolerate meclizine. Elevated liver enzymes; Incidental finding liver enzymes were elevated. She presents to day for repeat liver enzyme studies. Insomnia; on Ambien with David Trinh prior to her [facility] . Confirmed I plan to continue prescribing discussed patient agreement and UDS. She does not need Ambien at this time. depression screening addressed active treatment for depression.     abuse screening addressed denies    learning assessment addressed reviewed nurses notes    fall risk addressed not at risk     HM: addressed declines vaccines today    ROS:  Gen: denies fever, chills, fatigue, weight loss, weight gain  HEENT:denies blurry vision, nasal congestion, sore throat  Resp: denies dypsnea, cough, wheezing  CV: denies chest pain radiating to the jaws or arms, palpitations, lower extremity edema  Abd: denies nausea, vomiting, diarrhea, constipation  Neuro: denies numbness/tingling  Endo: denies polyuria, polydipsia, heat/cold intolerance  Heme: no lymphadenopathy    Allergies Allergen Reactions    Pcn [Penicillins] Other (comments)     Family hx of allergies to PCN. Current Outpatient Medications:     Cymbalta 30 mg capsule, Take 1 Capsule by mouth daily. , Disp: 90 Capsule, Rfl: 2    meclizine (ANTIVERT) 25 mg tablet, TAKE 1 TABLET BY MOUTH EVERY 8 HOURS AS NEEDED FOR DIZZINESS, Disp: , Rfl:     fluconazole (DIFLUCAN) 150 mg tablet, Take 1 Tablet by mouth daily for 1 day., Disp: 1 Tablet, Rfl: 0    zolpidem (AMBIEN) 10 mg tablet, Take 1 Tablet by mouth nightly as needed for Sleep. Max Daily Amount: 10 mg. May take 1/2 if full dose not needed. , Disp: 30 Tablet, Rfl: 0    levocetirizine (XYZAL) 5 mg tablet, TAKE 1 TABLET BY MOUTH ONCE DAILY AS NEEDED FOR ALLERGIES, Disp: 30 Tablet, Rfl: 0    ipratropium (ATROVENT) 42 mcg (0.06 %) nasal spray, 1 [place] by Both Nostrils route four (4) times daily. Indications: runny nose, Disp: 15 mL, Rfl: 0    omeprazole (PRILOSEC) 20 mg capsule, Take 1 capsule by mouth once daily, Disp: 90 Capsule, Rfl: 0    minocycline (DYNACIN) 50 mg tablet, Take 1 Tablet by mouth two (2) times a day., Disp: 180 Tablet, Rfl: 1    ARIPiprazole (ABILIFY) 2 mg tablet, Take 1 Tablet by mouth daily. , Disp: 90 Tablet, Rfl: 2    albuterol (PROVENTIL HFA, VENTOLIN HFA, PROAIR HFA) 90 mcg/actuation inhaler, Take 2 Puffs by inhalation every four (4) hours as needed for Wheezing. Indications: asthma attack, Disp: 1 Inhaler, Rfl: 1    norethindrone-e estradiol-iron (Junel Fe 24) 1 mg-20 mcg (24)/75 mg (4) tab, Junel Fe 24 1 mg-20 mcg (24)/75 mg (4) tablet  Take 1 tablet by mouth once daily, Disp: , Rfl:     montelukast (SINGULAIR) 10 mg tablet, Take 10 mg by mouth At bedtime. , Disp: , Rfl:     Sudogest 60 mg tablet, TAKE 1 TABLET BY MOUTH EVERY 6 HOURS AS NEEDED FOR  CONGESTION (Patient not taking: Reported on 10/26/2022), Disp: 40 Tablet, Rfl: 0    APPLE CIDER VINEGAR PO, Take  by mouth.  (Patient not taking: Reported on 10/26/2022), Disp: , Rfl:     BIOTIN PO, Take  by mouth. (Patient not taking: Reported on 10/26/2022), Disp: , Rfl:     dextromethorphan-guaiFENesin (DELSYM COUGH-CHEST CONGEST DM) 5-100 mg/5 mL liqd, Take 10 mL by mouth every six to eight (6-8) hours as needed for Cough. Indications: cough (Patient not taking: Reported on 10/26/2022), Disp: 118 mL, Rfl: 1    dicyclomine (BENTYL) 10 mg capsule, Take 1 Cap by mouth four (4) times daily. Indications: colic (Patient not taking: Reported on 10/26/2022), Disp: 180 Cap, Rfl: 1    Past Medical History:   Diagnosis Date    ADD (attention deficit disorder)     Bronchial spasms     Celiac disease     Celiac disease 05/2019    Depression 5/9/2019    Fracture, ankle 06/14/2020    right    GERD (gastroesophageal reflux disease)     Herpes simplex virus (HSV) infection     Hx of herpes simplex infection 05/13/2009    lips    Panic        Past Surgical History:   Procedure Laterality Date    HX OTHER SURGICAL  05/2019    upper endoscopy with biopsy    HX OTHER SURGICAL  07/09/2021    surgery on right and left foot on toes.        Social History     Tobacco Use   Smoking Status Former   Smokeless Tobacco Never       Social History     Socioeconomic History    Marital status: SINGLE   Tobacco Use    Smoking status: Former    Smokeless tobacco: Never   Vaping Use    Vaping Use: Never used   Substance and Sexual Activity    Alcohol use: No    Drug use: No    Sexual activity: Yes     Partners: Male     Social Determinants of Health     Financial Resource Strain: Low Risk     Difficulty of Paying Living Expenses: Not hard at all   Food Insecurity: No Food Insecurity    Worried About Running Out of Food in the Last Year: Never true    Ran Out of Food in the Last Year: Never true   Transportation Needs: No Transportation Needs    Lack of Transportation (Medical): No    Lack of Transportation (Non-Medical): No       Family History   Problem Relation Age of Onset    Asthma Sister     Migraines Maternal Grandmother     Heart Disease Paternal Grandmother     Lung Disease Paternal Grandmother     Hypertension Paternal Grandmother     Hypertension Paternal Grandfather     Depression Mother         postpartum    Anxiety Mother     Alcohol abuse Father     Drug Abuse Maternal Uncle     Cancer Maternal Grandfather         lungs         Objective:     Visit Vitals  /88 (BP 1 Location: Left upper arm, BP Patient Position: Sitting, BP Cuff Size: Large adult)   Pulse (!) 108   Temp 97.2 °F (36.2 °C) (Temporal)   Resp 18   Ht 5' 5\" (1.651 m)   Wt 240 lb 6.4 oz (109 kg)   SpO2 98%   BMI 40.00 kg/m²     Body mass index is 40 kg/m². General: Alert and oriented. No acute distress. Well nourished  HEENT :  Ears:TMs are normal. Canals are clear. Eyes: pupils equal, round, react to light and accommodation. Extra ocular movements intact. Nose: patent. Mouth and throat is clear. Neck:supple full range of motion no thyromegaly. Trachea midline, No carotid bruits. No significant lymphadenopathy  Lungs[de-identified] clear to auscultation without wheezes, rales, or rhonchi. Heart :RRR, S1 & S2 are normal intensity. No murmur; no gallop  Abdomen: bowel sounds active. No tenderness, guarding, rebound, masses, hepatic or spleen enlargement  Back: no CVA tenderness. Extremities: without clubbing, cyanosis, or edema  Pulses: radial and femoral pulses are normal  Neuro: HMF intact. Cranial nerves II through XII grossly normal.  Motor: is 5 over 5 and symmetrical.   Deep tendon reflexes: +2 equal  Integumentary; erythematous raised bumps skin folds under breasts. Psych:appropriate behavior, mood, affect and judgement.    Results for orders placed or performed in visit on 06/24/21   CBC WITH AUTOMATED DIFF   Result Value Ref Range    WBC 10.0 3.4 - 10.8 x10E3/uL    RBC 4.31 3.77 - 5.28 x10E6/uL    HGB 13.1 11.1 - 15.9 g/dL    HCT 39.3 34.0 - 46.6 %    MCV 91 79 - 97 fL    MCH 30.4 26.6 - 33.0 pg    MCHC 33.3 31.5 - 35.7 g/dL    RDW 12.1 11.7 - 15.4 %    PLATELET 705 (H) 499 - 450 x10E3/uL    NEUTROPHILS 55 Not Estab. %    Lymphocytes 34 Not Estab. %    MONOCYTES 6 Not Estab. %    EOSINOPHILS 4 Not Estab. %    BASOPHILS 1 Not Estab. %    ABS. NEUTROPHILS 5.6 1.4 - 7.0 x10E3/uL    Abs Lymphocytes 3.4 (H) 0.7 - 3.1 x10E3/uL    ABS. MONOCYTES 0.6 0.1 - 0.9 x10E3/uL    ABS. EOSINOPHILS 0.4 0.0 - 0.4 x10E3/uL    ABS. BASOPHILS 0.1 0.0 - 0.2 x10E3/uL    IMMATURE GRANULOCYTES 0 Not Estab. %    ABS. IMM. GRANS. 0.0 0.0 - 0.1 S58V8/MU   METABOLIC PANEL, BASIC   Result Value Ref Range    Glucose 94 65 - 99 mg/dL    BUN 11 6 - 20 mg/dL    Creatinine 0.66 0.57 - 1.00 mg/dL    GFR est non- >59 mL/min/1.73    GFR est  >59 mL/min/1.73    BUN/Creatinine ratio 17 9 - 23    Sodium 137 134 - 144 mmol/L    Potassium 4.1 3.5 - 5.2 mmol/L    Chloride 101 96 - 106 mmol/L    CO2 19 (L) 20 - 29 mmol/L    Calcium 9.6 8.7 - 10.2 mg/dL   HEPATITIS C AB   Result Value Ref Range    Hep C Virus Ab <0.1 0.0 - 0.9 s/co ratio   TSH 3RD GENERATION   Result Value Ref Range    TSH 1.390 0.450 - 4.500 uIU/mL       No results found for this visit on 10/26/22. Assessment/ Plan:     1. Elevated liver enzymes  Repeat  - HEPATIC FUNCTION PANEL; Future  - COLLECTION VENOUS BLOOD,VENIPUNCTURE; Future  - HEPATIC FUNCTION PANEL  - COLLECTION VENOUS BLOOD,VENIPUNCTURE    2. Fatigue, unspecified type    - CBC WITH AUTOMATED DIFF; Future  - RENAL FUNCTION PANEL; Future  - CBC WITH AUTOMATED DIFF  - RENAL FUNCTION PANEL    3. Screening for diabetes mellitus (DM)  Frquent candida infections   - HEMOGLOBIN A1C WITH EAG; Future  - HEMOGLOBIN A1C WITH EAG    4.  Candida infection  Diflucan as noted below  Continue OTC Lotrimin  We discussed adding cotton wash cloths under the breasts to wick away moisture       Orders Placed This Encounter    COLLECTION VENOUS BLOOD,VENIPUNCTURE     Standing Status:   Future     Number of Occurrences:   1     Standing Expiration Date:   11/26/2022    HEPATIC FUNCTION PANEL     Standing Status: Future     Number of Occurrences:   1     Standing Expiration Date:   11/26/2022    CBC WITH AUTOMATED DIFF     Standing Status:   Future     Number of Occurrences:   1     Standing Expiration Date:   11/26/2022    RENAL FUNCTION PANEL     Standing Status:   Future     Number of Occurrences:   1     Standing Expiration Date:   11/26/2022    HEMOGLOBIN A1C WITH EAG     Standing Status:   Future     Number of Occurrences:   1     Standing Expiration Date:   11/26/2022    meclizine (ANTIVERT) 25 mg tablet     Sig: TAKE 1 TABLET BY MOUTH EVERY 8 HOURS AS NEEDED FOR DIZZINESS    fluconazole (DIFLUCAN) 150 mg tablet     Sig: Take 1 Tablet by mouth daily for 1 day. Dispense:  1 Tablet     Refill:  0    Cymbalta 30 mg capsule     Sig: Take 1 Capsule by mouth daily. Dispense:  90 Capsule     Refill:  2     Brand name only. Verbal and written instructions (see AVS) provided. Patient expresses understanding of diagnosis and treatment plan.     Health Maintenance Due   Topic Date Due    COVID-19 Vaccine (1) Never done               Severiano Guthrie, FNP-C

## 2022-10-27 NOTE — TELEPHONE ENCOUNTER
Pt was informed per Josh Brooks of her abnormal results, DM and a fatty liver, the others are stable. Per also, she can schedule a sooner appointment with her if she does not want to wait until 11/16/2022. Per Nitish Worrell / front, she can do a VV on this coming Tuesday 11/01/2022 at 9:10. Ms Med Anthony stated OK of understanding the conversation and thanks.

## 2022-10-28 ENCOUNTER — TELEPHONE (OUTPATIENT)
Dept: FAMILY MEDICINE CLINIC | Age: 32
End: 2022-10-28

## 2022-10-28 RX ORDER — LEVOCETIRIZINE DIHYDROCHLORIDE 5 MG/1
5 TABLET, FILM COATED ORAL DAILY
Qty: 30 TABLET | Refills: 0 | Status: SHIPPED | OUTPATIENT
Start: 2022-10-28 | End: 2022-11-30

## 2022-10-28 NOTE — TELEPHONE ENCOUNTER
Received an prior authorization form from cover my med's from pharmacy for Cymbalta   It was denied stating to resubmit request using smaller quantity and days supply so I asked pharmacy to run RX for 30 day supply it was still denied coverage . Per pharmacy it maybe because it is for Name Brand ?   Can generic Rx be sent in ?

## 2022-10-28 NOTE — TELEPHONE ENCOUNTER
Message routed to provider to approve refill.     Requested Prescriptions     Pending Prescriptions Disp Refills    levocetirizine (XYZAL) 5 mg tablet 30 Tablet 0

## 2022-10-31 NOTE — TELEPHONE ENCOUNTER
Called patient's insurance company for prior authorization needed on Brand Name Cymbalta 30  It has been Approved  . From  10.31.22 until 10.31.23. Reference Number for case [id number]. Price Ho & Patient has been advised .

## 2022-11-01 ENCOUNTER — VIRTUAL VISIT (OUTPATIENT)
Dept: FAMILY MEDICINE CLINIC | Age: 32
End: 2022-11-01
Payer: MEDICAID

## 2022-11-01 DIAGNOSIS — R73.09 ELEVATED HEMOGLOBIN A1C: Primary | ICD-10-CM

## 2022-11-01 PROCEDURE — 99214 OFFICE O/P EST MOD 30 MIN: CPT | Performed by: NURSE PRACTITIONER

## 2022-11-01 NOTE — PROGRESS NOTES
Manjit Canada is a 29 y.o. female who was seen by synchronous (real-time) audio-video technology on 11/1/2022 for Abnormal Lab Results    Patient presents to discuss lab results. Hemoglobin a1c was elevated at 6.6 indicating diabetes. This is a new diagnosis for the patient and she endorses some anxiety regarding the diagnosis. She states she struggles with celiac disease and being told she has diabetes is upsetting. She states she does most of the cooking for her family and feels she eats fairly healthy but could potentially look at portion sizes. She is interested in more information so she can begin planning lifestyle modifications. Assessment & Plan:   Diagnoses and all orders for this visit:    1. Elevated hemoglobin A1c    Education provided regarding her results and diabetes disease process. Patient concerned about relation between Celiac and Diabetes. Patient provided with handouts regarding diabetes management including dietary recommendations, symptoms of low/high blood sugars and monitoring. She will follow up in 2 weeks for labs. I spent at least 30 minutes on this visit with this established patient. Subjective:       Prior to Admission medications    Medication Sig Start Date End Date Taking? Authorizing Provider   levocetirizine (XYZAL) 5 mg tablet Take 1 Tablet by mouth daily. 10/28/22  Yes Frances Baron NP   Cymbalta 30 mg capsule Take 1 Capsule by mouth daily. 10/27/22  Yes Frances Baron NP   meclizine (ANTIVERT) 25 mg tablet TAKE 1 TABLET BY MOUTH EVERY 8 HOURS AS NEEDED FOR DIZZINESS 10/19/22  Yes Provider, Historical   zolpidem (AMBIEN) 10 mg tablet Take 1 Tablet by mouth nightly as needed for Sleep. Max Daily Amount: 10 mg. May take 1/2 if full dose not needed. 10/7/22  Yes Beverly Pina NP   ipratropium (ATROVENT) 42 mcg (0.06 %) nasal spray 1 [place] by Both Nostrils route four (4) times daily.  Indications: runny nose 9/6/22  Yes Kingston Larios NP omeprazole (PRILOSEC) 20 mg capsule Take 1 capsule by mouth once daily 7/3/22  Yes Felipa Smith NP   minocycline [facility]) 50 mg tablet Take 1 Tablet by mouth two (2) times a day. 5/5/22  Yes Felipa Smith NP   ARIPiprazole (ABILIFY) 2 mg tablet Take 1 Tablet by mouth daily. 3/3/22  Yes Ondina Gomez NP   Sudogest 60 mg tablet TAKE 1 TABLET BY MOUTH EVERY 6 HOURS AS NEEDED FOR  CONGESTION 11/3/21  Yes Felipa Smith NP   albuterol (PROVENTIL HFA, VENTOLIN HFA, PROAIR HFA) 90 mcg/actuation inhaler Take 2 Puffs by inhalation every four (4) hours as needed for Wheezing. Indications: asthma attack 7/7/21  Yes Felipa Smith NP   norethindrone-e estradiol-iron (Junel Fe 24) 1 mg-20 mcg (24)/75 mg (4) tab Junel Fe 24 1 mg-20 mcg (24)/75 mg (4) tablet   Take 1 tablet by mouth once daily   Yes Provider, Historical   montelukast (SINGULAIR) 10 mg tablet Take 10 mg by mouth At bedtime. Yes Provider, Historical   BIOTIN PO Take  by mouth. Yes Provider, Historical   dextromethorphan-guaiFENesin (DELSYM COUGH-CHEST CONGEST DM) 5-100 mg/5 mL liqd Take 10 mL by mouth every six to eight (6-8) hours as needed for Cough. Indications: cough 5/29/19  Yes Felipa Smith NP   dicyclomine (BENTYL) 10 mg capsule Take 1 Cap by mouth four (4) times daily. Indications: colic 0/1/41  Yes Felipa Smith NP   APPLE CIDER VINEGAR PO Take  by mouth. Patient not taking: Reported on 10/26/2022    Provider, Historical     Patient Active Problem List   Diagnosis Code    MOHAMUD (generalized anxiety disorder) F41.1    Recurrent major depressive disorder, in partial remission (HCC) F33.41    Insomnia disorder with non-sleep disorder mental comorbidity G47.00    Gastrointestinal problem R19.8     Current Outpatient Medications   Medication Sig Dispense Refill    levocetirizine (XYZAL) 5 mg tablet Take 1 Tablet by mouth daily. 30 Tablet 0    Cymbalta 30 mg capsule Take 1 Capsule by mouth daily.  90 Capsule 2    meclizine (ANTIVERT) 25 mg tablet TAKE 1 TABLET BY MOUTH EVERY 8 HOURS AS NEEDED FOR DIZZINESS      zolpidem (AMBIEN) 10 mg tablet Take 1 Tablet by mouth nightly as needed for Sleep. Max Daily Amount: 10 mg. May take 1/2 if full dose not needed. 30 Tablet 0    ipratropium (ATROVENT) 42 mcg (0.06 %) nasal spray 1 [place] by Both Nostrils route four (4) times daily. Indications: runny nose 15 mL 0    omeprazole (PRILOSEC) 20 mg capsule Take 1 capsule by mouth once daily 90 Capsule 0    minocycline (DYNACIN) 50 mg tablet Take 1 Tablet by mouth two (2) times a day. 180 Tablet 1    ARIPiprazole (ABILIFY) 2 mg tablet Take 1 Tablet by mouth daily. 90 Tablet 2    Sudogest 60 mg tablet TAKE 1 TABLET BY MOUTH EVERY 6 HOURS AS NEEDED FOR  CONGESTION 40 Tablet 0    albuterol (PROVENTIL HFA, VENTOLIN HFA, PROAIR HFA) 90 mcg/actuation inhaler Take 2 Puffs by inhalation every four (4) hours as needed for Wheezing. Indications: asthma attack 1 Inhaler 1    norethindrone-e estradiol-iron (Junel Fe 24) 1 mg-20 mcg (24)/75 mg (4) tab Junel Fe 24 1 mg-20 mcg (24)/75 mg (4) tablet   Take 1 tablet by mouth once daily      montelukast (SINGULAIR) 10 mg tablet Take 10 mg by mouth At bedtime. BIOTIN PO Take  by mouth. dextromethorphan-guaiFENesin (DELSYM COUGH-CHEST CONGEST DM) 5-100 mg/5 mL liqd Take 10 mL by mouth every six to eight (6-8) hours as needed for Cough. Indications: cough 118 mL 1    dicyclomine (BENTYL) 10 mg capsule Take 1 Cap by mouth four (4) times daily. Indications: colic 924 Cap 1    APPLE CIDER VINEGAR PO Take  by mouth. (Patient not taking: Reported on 10/26/2022)         ROS-All pertinent information in the HPI.      Objective:     Patient-Reported Vitals 5/5/2022   Patient-Reported Temperature -   Patient-Reported LMP no periods            Constitutional: [x] Appears well-developed and well-nourished [x] No apparent distress      [] Abnormal -     Mental status: [x] Alert and awake  [x] Oriented to person/place/time [x] Able to follow commands    [] Abnormal -     Eyes:   EOM    [x]  Normal    [] Abnormal -   Sclera  [x]  Normal    [] Abnormal -          Discharge [x]  None visible   [] Abnormal -     HENT: [x] Normocephalic, atraumatic  [] Abnormal -   [x] Mouth/Throat: Mucous membranes are moist    External Ears [x] Normal  [] Abnormal -    Neck: [x] No visualized mass [] Abnormal -     Pulmonary/Chest: [x] Respiratory effort normal   [x] No visualized signs of difficulty breathing or respiratory distress        [] Abnormal -      Musculoskeletal:   [x] Normal gait with no signs of ataxia         [x] Normal range of motion of neck        [] Abnormal -     Neurological:        [x] No Facial Asymmetry (Cranial nerve 7 motor function) (limited exam due to video visit)          [x] No gaze palsy        [] Abnormal -          Skin:        [x] No significant exanthematous lesions or discoloration noted on facial skin         [] Abnormal -            Psychiatric:       [x] Normal Affect [] Abnormal -        [x] No Hallucinations    Other pertinent observable physical exam findings:-        We discussed the expected course, resolution and complications of the diagnosis(es) in detail. Medication risks, benefits, costs, interactions, and alternatives were discussed as indicated. I advised her to contact the office if her condition worsens, changes or fails to improve as anticipated. She expressed understanding with the diagnosis(es) and plan. Bereket Miranda, was evaluated through a synchronous (real-time) audio-video encounter. The patient (or guardian if applicable) is aware that this is a billable service, which includes applicable co-pays. This Virtual Visit was conducted with patient's (and/or legal guardian's) consent. The visit was conducted pursuant to the emergency declaration under the [address] waiver authority and the [email or web address] General Act. Patient identification was verified, and a caregiver was present when appropriate. The patient was located at: Home: [address]  [place] [address]  The provider was located at:  Facility ([facility] Department): [id number] [place] [phone]        Nikhil Walton NP

## 2022-11-01 NOTE — PROGRESS NOTES
1. \"Have you been to the ER, urgent care clinic since your last visit? Hospitalized since your last visit? \" No    2. \"Have you seen or consulted any other health care providers outside of the [address] [place] [address] since your last visit? \" No     3. For patients aged 39-70: Has the patient had a colonoscopy / FIT/ Cologuard? NA - based on age      If the patient is female:    4. For patients aged 41-77: Has the patient had a mammogram within the past 2 years? NA - based on age or sex      11. For patients aged 21-65: Has the patient had a pap smear? Yes - no Care Gap present.     Chief Complaint   Patient presents with    Abnormal Lab Results

## 2022-11-15 NOTE — PROGRESS NOTES
[address] [facility] [place]. [place], [address]   Tel.  [phone]   Fax. 100 [facility] 60   [place], [address] [facility]   Tel.  [phone]   Fax. [phone] [address]   Tel.  [phone]   Fax. Edmund Marsh (: 1990) is a 29 y.o. female, established patient, seen for positive airway pressure follow-up, she was last seen by Dr. Mor Parks on 2022, prior notes reviewed in detail. Home sleep test 2022 showed AHI of 14/hr with a lowest SpO2 of 84%. She is seen today for follow up. ASSESSMENT/PLAN:    ICD-10-CM ICD-9-CM    1. VANESSA (obstructive sleep apnea)  G47.33 327.23 SLEEP LAB (PAP TITRATION)      AMB SUPPLY ORDER      2. BMI 40.0-44.9, adult ([facility] [address]  Z68.41 V85.41         AHI = 14 (). On APAP :  5-12 cmH2O. Set up ? Brenda Crumble Sleep Apnea -  She admits to struggling with PAP since starting therapy. She has trouble keeping the device on at night and will remove it in her sleep. She notes she has tried the full face mask and where the headgear sits has been causing pressure on her skull that is uncomfortable. She has been worried that her apnea is not being treated adequately because she removes her device so often at night. She is also anxious she will not meet the insurance requirement to keep the device. She is agreeable to a titration study to trouble shoot mask/ comfort issues. She has an appointment at her "[employer] company to try a nasal style mask with a hose on the top of the head, as she notes she often will sleep on her stomach and move often in bed. * Supplies ordered - nasal mask and heated tubing    Orders Placed This Encounter    AMB SUPPLY ORDER     Diagnosis: (G47.33) VANESSA (obstructive sleep apnea)  (primary encounter diagnosis)     Replacement Supplies for Positive Airway Pressure Therapy Device:   Duration of need: 99 months.  Nasal Pillows Combo Mask (Replace) 2 per month.    Nasal Pillows (Replace) 2 per month.  Nasal Cushion (Replace) 2 per month.  Nasal Interface Mask 1 every 3 months.  Headgear 1 every 6 months.  Positive Airway Pressure chinstrap 1 every 6 months.  Tubing with heating element 1 every 3 months.  Filter(s) Disposable 2 per month.  Filter(s) Non-Disposable 1 every 6 months. .   [address] for Humidifier (Replace) 1 every 6 months. Larose Schwab, [facility] NPI: [id number]    Electronically signed. Date:- 11/16/22    92163 POLYSOMNOGRAPHY CPAP TITRATION     Please work with patient to find a comfortable mask style for which she can keep on at night. She notes she has been taking the mask off in her sleep and struggling to sleep with the device on at night. Standing Status:   Future     Standing Expiration Date:   11/16/2023     Scheduling Instructions:      Dr. Raghu Jon Specific Question:   Reason for Exam     Answer:   mask issues - having trouble keeping device on at night     *  Counseling was provided regarding the importance of regular PAP use with emphasis on ensuring sufficient total sleep time, proper sleep hygiene, and safe driving. * Re-enforced proper and regular cleaning for the device. * She was asked to contact our office for any problems regarding PAP therapy. 2. Recommended a dedicated weight loss program through appropriate diet and exercise regimen as significant weight reduction has been shown to reduce severity of obstructive sleep apnea. SUBJECTIVE/OBJECTIVE:    She  is seen today for follow up on PAP device and reports problems using the device. The following concerns reviewed:    Drowsiness yes Problems exhaling no   Snoring no Forget to put on no   Mask Comfortable No; having trouble keeping device on at night.   Can't fall asleep no   Dry Mouth no Mask falls off yes   Air Leaking no Frequent awakenings yes     She admits that her sleep has not changed on PAP therapy using full face mask and heated tubing. Review of device download indicated:  Auto pressure: 5-12 cmH2O; Max Pressure: 8.3 cmH2O; 95th Percentile Pressure: 7.6 cmH2O  95th Percentile Leak: 10.2 L/min  % Used Days >= 4 hours: 63.  Avg hours used:  4 hours 17 minutes. Therapy Apnea Index averaged over PAP use: 0.3 /hr which reflects significantly improved sleep breathing condition. Murray Sleepiness Score: (P) 3 and Modified F.O.S.Q. Score Total / 2: (P) 16.5 which reflects improved sleep quality over therapy time. Sleep Review of Systems: notable for Negative difficulty falling asleep; Positive awakenings at night; Negative early morning headaches; Negative memory problems; Negative concentration issues; Negative chest pain; Negative shortness of breath; Negative significant joint pain at night; Negative significant muscle pain at night; Negative rashes or itching; Negative heartburn; Negative significant mood issues; Vitals reported by patient   Patient-Reported Vitals 11/15/2022   Patient-Reported Weight 240   Patient-Reported Temperature -   Patient-Reported LMP Not sexually active, skip periods with birth control usage      Calculated BMI 40    Physical Exam completed by visual and auditory observation of patient with verbal input from patient. General:   Alert, oriented, not in acute distress   Eyes:  Anicteric Sclerae; no obvious strabismus   Nose:  No obvious nasal septum deviation    Neck:   Midline trachea, no visible mass   Chest/Lungs:  Respiratory effort normal, no visualized signs of difficulty breathing or respiratory distress   CVS:  No JVD   Extremities:  No obvious rashes noted on face, neck, or hands   Neuro:  No facial asymmetry, no focal deficits; no obvious tremor    Psych:  Normal affect,  normal countenance     Trever Arriaza is being evaluated by a Virtual Visit (video visit) encounter to address concerns as mentioned above. A caregiver was present when appropriate.  Due to this being a TeleHealth encounter (During USCTV-52 public health emergency), evaluation of the following organ systems was limited: Vitals/Constitutional/EENT/Resp/CV/GI//MS/Neuro/Skin/Heme-Lymph-Imm. Pursuant to the emergency declaration under the [address] [place] [address] and the [employer] Act, this Virtual Visit was conducted with patient's (and/or legal guardian's) consent, to reduce the patient's risk of exposure to COVID-19 and provide necessary medical care. The patient (or guardian if applicable) is aware that this is a billable service, which includes applicable copays. Patient identification was verified at the start of the visit: YES using name and date of birth. Patient's phone number [phone] (home)  was confirmed for accuracy. She gives permission for messages regarding results and appointments to be left at that number. Services were provided through a video synchronous discussion virtually to substitute for in-person clinic visit. I was at home while conducting this encounter, patient located at their home or alternate location of their choice. Wandy Bassem, was evaluated through a synchronous (real-time) audio-video encounter. The patient (or guardian if applicable) is aware that this is a billable service, which includes applicable co-pays. This Virtual Visit was conducted with patient's (and/or legal guardian's) consent. The visit was conducted pursuant to the emergency declaration under the [address] [place] [address] and the [employer] Act. Patient identification was verified, and a caregiver was present when appropriate.   The patient was located at: Home: [address] [facility]  [employer] [employer]   The provider was located at: Home: [unfilled]      --Pravin Looney NP on 11/16/2022 at 9:55 AM    An electronic signature was used to authenticate this note.

## 2022-11-16 ENCOUNTER — DOCUMENTATION ONLY (OUTPATIENT)
Dept: SLEEP MEDICINE | Age: 32
End: 2022-11-16

## 2022-11-16 ENCOUNTER — VIRTUAL VISIT (OUTPATIENT)
Dept: SLEEP MEDICINE | Age: 32
End: 2022-11-16

## 2022-11-16 ENCOUNTER — OFFICE VISIT (OUTPATIENT)
Dept: FAMILY MEDICINE CLINIC | Age: 32
End: 2022-11-16

## 2022-11-16 DIAGNOSIS — G47.33 OSA (OBSTRUCTIVE SLEEP APNEA): Primary | ICD-10-CM

## 2022-11-16 DIAGNOSIS — F41.9 ANXIETY: ICD-10-CM

## 2022-11-16 DIAGNOSIS — Z13.1 SPECIAL SCREENING EXAMINATION FOR DIABETES MELLITUS: ICD-10-CM

## 2022-11-16 DIAGNOSIS — F51.01 PRIMARY INSOMNIA: Primary | ICD-10-CM

## 2022-11-16 DIAGNOSIS — E11.9 CONTROLLED TYPE 2 DIABETES MELLITUS WITHOUT COMPLICATION, WITHOUT LONG-TERM CURRENT USE OF INSULIN (HCC): ICD-10-CM

## 2022-11-16 PROCEDURE — 3044F HG A1C LEVEL LT 7.0%: CPT | Performed by: NURSE PRACTITIONER

## 2022-11-16 PROCEDURE — 99213 OFFICE O/P EST LOW 20 MIN: CPT | Performed by: NURSE PRACTITIONER

## 2022-11-16 PROCEDURE — 99214 OFFICE O/P EST MOD 30 MIN: CPT | Performed by: NURSE PRACTITIONER

## 2022-11-16 RX ORDER — IBUPROFEN 800 MG/1
TABLET ORAL
COMMUNITY
Start: 2022-11-09

## 2022-11-16 RX ORDER — HYDROCODONE BITARTRATE AND ACETAMINOPHEN 5; 325 MG/1; MG/1
TABLET ORAL
COMMUNITY
Start: 2022-11-09 | End: 2022-11-16

## 2022-11-16 RX ORDER — ZOLPIDEM TARTRATE 10 MG/1
10 TABLET ORAL
Qty: 30 TABLET | Refills: 0 | Status: SHIPPED | OUTPATIENT
Start: 2022-11-16

## 2022-11-16 NOTE — PROGRESS NOTES
Chief Complaint   Patient presents with    Insomnia    Rash     Under breast      1. \"Have you been to the ER, urgent care clinic since your last visit? Hospitalized since your last visit? \"  Yes [facility] [place] [facility]- fracture rt ankle    2. \"Have you seen or consulted any other health care providers outside of the [address] [place] [address] since your last visit? \" No     3. For patients aged 39-70: Has the patient had a colonoscopy / FIT/ Cologuard? NA - based on age      If the patient is female:    4. For patients aged 41-77: Has the patient had a mammogram within the past 2 years? NA - based on age or sex      11. For patients aged 21-65: Has the patient had a pap smear?  Yes - no Care Gap present

## 2022-11-16 NOTE — PATIENT INSTRUCTIONS
[address] [facility] [address] [place]. [place], [address]  Tel.  [phone]  Fax. 100 [facility] 60  [place], [address] [facility]  Tel.  [phone]  Fax. [phone] [address]  Tel.  [phone]  Fax. [phone]     Learning About CPAP for Sleep Apnea  What is CPAP? CPAP is a small machine that you use at home every night while you sleep. It increases air pressure in your throat to keep your airway open. When you have sleep apnea, this can help you sleep better so you feel much better. CPAP stands for \"continuous positive airway pressure. \"  The CPAP machine will have one of the following:  A mask that covers your nose and mouth  Prongs that fit into your nose  A mask that covers your nose only, the most common type. This type is called NCPAP. The N stands for \"nasal.\"  Why is it done? CPAP is usually the best treatment for obstructive sleep apnea. It is the first treatment choice and the most widely used. Your doctor may suggest CPAP if you have: Moderate to severe sleep apnea. Sleep apnea and coronary artery disease (CAD) or heart failure. How does it help? CPAP can help you have more normal sleep, so you feel less sleepy and more alert during the daytime. CPAP may help keep heart failure or other heart problems from getting worse. NCPAP may help lower your blood pressure. If you use CPAP, your bed partner may also sleep better because you are not snoring or restless. What are the side effects? Some people who use CPAP have:  A dry or stuffy nose and a sore throat. Irritated skin on the face. Sore eyes. Bloating. If you have any of these problems, work with your doctor to fix them. Here are some things you can try:  Be sure the mask or nasal prongs fit well. See if your doctor can adjust the pressure of your CPAP. If your nose is dry, try a humidifier.   If your nose is runny or stuffy, try decongestant medicine or a steroid nasal spray. If these things do not help, you might try a different type of machine. Some machines have air pressure that adjusts on its own. Others have air pressures that are different when you breathe in than when you breathe out. This may reduce discomfort caused by too much pressure in your nose. Where can you learn more? Go to [employer]  Enter Jim Doyle in the search box to learn more about \"Learning About CPAP for Sleep Apnea. \"   © 2929-4493 Healthwise, Incorporated. Care instructions adapted under license by [facility] (which disclaims liability or warranty for this information). This care instruction is for use with your licensed healthcare professional. If you have questions about a medical condition or this instruction, always ask your healthcare professional. [address] any warranty or liability for your use of this information. Content Version: 1.9.84902; Last Revised: January 11, 2010  PROPER SLEEP HYGIENE    What to avoid  Do not have drinks with caffeine, such as coffee or black tea, for 8 hours before bed. Do not smoke or use other types of tobacco near bedtime. Nicotine is a stimulant and can keep you awake. Avoid drinking alcohol late in the evening, because it can cause you to wake in the middle of the night. Do not eat a big meal close to bedtime. If you are hungry, eat a light snack. Do not drink a lot of water close to bedtime, because the need to urinate may wake you up during the night. Do not read or watch TV in bed. Use the bed only for sleeping and sexual activity. What to try  Go to bed at the same time every night, and wake up at the same time every morning. Do not take naps during the day. Keep your bedroom quiet, dark, and cool. Get regular exercise, but not within 3 to 4 hours of your bedtime. .  Sleep on a comfortable pillow and mattress.   If watching the clock makes you anxious, turn it facing away from you so you cannot see the time. If you worry when you lie down, start a worry book. Well before bedtime, write down your worries, and then set the book and your concerns aside. Try meditation or other relaxation techniques before you go to bed. If you cannot fall asleep, get up and go to another room until you feel sleepy. Do something relaxing. Repeat your bedtime routine before you go to bed again. Make your house quiet and calm about an hour before bedtime. Turn down the lights, turn off the TV, log off the computer, and turn down the volume on music. This can help you relax after a busy day. Drowsy Driving: The [facility] cites drowsiness as a causing factor in more than 036,037 police reported crashes annually, resulting in 76,000 injuries and 1,500 deaths. Other surveys suggest 55% of people polled have driven while drowsy in the past year, 23% had fallen asleep but not crashed, 3% crashed, and 2% had and accident due to drowsy driving. Who is at risk? Young Drivers: One study of drowsy driving accidents states that 55% of the drivers were under 25 years. Of those, 75% were male. Shift Workers and Travelers: People who work overnight or travel across time zones frequently are at higher risk of experiencing Circadian Rhythm Disorders. They are trying to work and function when their body is programed to sleep. Sleep Deprived: Lack of sleep has a serious impact on your ability to pay attention or focus on a task. Consistently getting less than the average of 8 hours your body needs creates partial or cumulative sleep deprivation. Untreated Sleep Disorders: Sleep Apnea, Narcolepsy, R.L.S., and other sleep disorders (untreated) prevent a person from getting enough restful sleep. This leads to excessive daytime sleepiness and increases the risk for drowsy driving accidents by up to 7 times.   Medications / Alcohol: Even over the counter medications can cause drowsiness. Medications that impair a drivers attention should have a warning label. Alcohol naturally makes you sleepy and on its own can cause accidents. Combined with excessive drowsiness its effects are amplified. Signs of Drowsy Driving:   * You don't remember driving the last few miles   * You may drift out of your jan   * You are unable to focus and your thoughts wander   * You may yawn more often than normal   * You have difficulty keeping your eyes open / nodding off   * Missing traffic signs, speeding, or tailgating  Prevention-   Good sleep hygiene, lifestyle and behavioral choices have the most impact on drowsy driving. There is no substitute for sleep and the average person requires 8 hours nightly. If you find yourself driving drowsy, stop and sleep. Consider the sleep hygiene tips provided during your visit as well. Medication Refill Policy: Refills for all medications require 1 week advance notice. Please have your pharmacy fax a refill request. We are unable to fax, or call in \"controled substance\" medications and you will need to pick these prescriptions up from our office. eÃ‡ift Activation    Thank you for requesting access to eÃ‡ift. Please follow the instructions below to securely access and download your online medical record. eÃ‡ift allows you to send messages to your doctor, view your test results, renew your prescriptions, schedule appointments, and more. How Do I Sign Up? In your internet browser, go to [email or web address] [email or web address]. Click on the First Time User? Click Here link in the Sign In box. You will see the New Member Sign Up page. Enter your eÃ‡ift Access Code exactly as it appears below. You will not need to use this code after youve completed the sign-up process. If you do not sign up before the expiration date, you must request a new code. eÃ‡ift Access Code:  Activation code not generated  Current eÃ‡ift Status: Active (This is the date your Tinychat access code will )    Enter the last four digits of your Social Security Number (xxxx) and Date of Birth (mm/dd/yyyy) as indicated and click Submit. You will be taken to the next sign-up page. Create a Tinychat ID. This will be your Tinychat login ID and cannot be changed, so think of one that is secure and easy to remember. Create a Tinychat password. You can change your password at any time. Enter your Password Reset Question and Answer. This can be used at a later time if you forget your password. Enter your e-mail address. You will receive e-mail notification when new information is available in [address] Click Sign Up. You can now view and download portions of your medical record. Click the [employer] link to download a portable copy of your medical information. Additional Information    If you have questions, please call [phone]. Remember, Tinychat is NOT to be used for urgent needs. For medical emergencies, dial 911.

## 2022-11-17 NOTE — PROGRESS NOTES
Subjective:     Gladys Echevarria is a 29 y.o. female who presents today with the following:  Chief Complaint   Patient presents with    Insomnia    Rash     Under breast       Patient Active Problem List   Diagnosis Code    MOHAMUD (generalized anxiety disorder) F41.1    Recurrent major depressive [facility], [facility] F33.41    Insomnia disorder with non-sleep disorder mental comorbidity G47.00    Gastrointestinal problem R19.8         COMPLIANT WITH MEDICATION:   Denies chest pain, dyspnea, palpitations, headache and blurred vision. Blood pressure normotensive. Diabetes; New diagnosis  She endorses with hx of celiac and recent right  ankle fx. Anxiety and insomnia causing difficulties with coping with new diagnosis of diabetes. We discussed  diagnosis of diabetes and using the plate method to plan healthy meals . Focused on making choices and portion control. We also discussed next steps. depression screening addressed not at risk    abuse screening addressed denies    learning assessment addressed reviewed nurses notes    fall risk addressed not at risk    HM: addressed encouraged vaccinations. ROS:  Gen: denies fever, chills, fatigue, weight loss, weight gain  HEENT:denies blurry vision, nasal congestion, sore throat  Resp: denies dypsnea, cough, wheezing  CV: denies chest pain radiating to the jaws or arms, palpitations, lower extremity edema  Abd: denies nausea, vomiting, diarrhea, constipation  Neuro: denies numbness/tingling  Endo: denies polyuria, polydipsia, heat/cold intolerance  Heme: no lymphadenopathy    Allergies   Allergen Reactions    Food [Renick] Hives     Hives and tongue swelling to almonds    Pcn [Penicillins] Other (comments)     Family hx of allergies to PCN.          Current Outpatient Medications:     ibuprofen (MOTRIN) 800 mg tablet, TAKE 1 TABLET BY MOUTH EVERY 8 HOURS AS NEEDED FOR MILD PAIN OR MODERATE PAIN FOR UP TO 7 DAYS TAKE WITH FOOD, Disp: , Rfl: HYDROcodone-acetaminophen (NORCO) 5-325 mg per tablet, TAKE 1 TABLET BY MOUTH EVERY 8 HOURS AS NEEDED FOR SEVERE PAIN FOR UP TO 5 DAYS, Disp: , Rfl:     levocetirizine (XYZAL) 5 mg tablet, Take 1 Tablet by mouth daily. , Disp: 30 Tablet, Rfl: 0    Cymbalta 30 mg capsule, Take 1 Capsule by mouth daily. , Disp: 90 Capsule, Rfl: 2    zolpidem (AMBIEN) 10 mg tablet, Take 1 Tablet by mouth nightly as needed for Sleep. Max Daily Amount: 10 mg. May take 1/2 if full dose not needed. , Disp: 30 Tablet, Rfl: 0    omeprazole (PRILOSEC) 20 mg capsule, Take 1 capsule by mouth once daily, Disp: 90 Capsule, Rfl: 0    minocycline (DYNACIN) 50 mg tablet, Take 1 Tablet by mouth two (2) times a day., Disp: 180 Tablet, Rfl: 1    ARIPiprazole (ABILIFY) 2 mg tablet, Take 1 Tablet by mouth daily. , Disp: 90 Tablet, Rfl: 2    albuterol (PROVENTIL HFA, VENTOLIN HFA, PROAIR HFA) 90 mcg/actuation inhaler, Take 2 Puffs by inhalation every four (4) hours as needed for Wheezing. Indications: asthma attack, Disp: 1 Inhaler, Rfl: 1    norethindrone-e estradiol-iron (Junel Fe 24) 1 mg-20 mcg (24)/75 mg (4) tab, Junel Fe 24 1 mg-20 mcg (24)/75 mg (4) tablet  Take 1 tablet by mouth once daily, Disp: , Rfl:     Past Medical History:   Diagnosis Date    ADD (attention deficit disorder)     Bronchial spasms     Celiac disease     Celiac disease 05/2019    Depression 5/9/2019    Fracture, ankle 06/14/2020    right    GERD (gastroesophageal reflux disease)     Herpes simplex virus (HSV) infection     Hx of herpes simplex infection 05/13/2009    lips    Panic        Past Surgical History:   Procedure Laterality Date    HX OTHER SURGICAL  05/2019    upper endoscopy with biopsy    HX OTHER SURGICAL  07/09/2021    surgery on right and left foot on toes.        Social History     Tobacco Use   Smoking Status Former   Smokeless Tobacco Never       Social History     Socioeconomic History    Marital status: SINGLE   Tobacco Use    Smoking status: Former    Smokeless tobacco: Never   Vaping Use    Vaping Use: Never used   Substance and Sexual Activity    Alcohol use: No    Drug use: No    Sexual activity: Yes     Partners: Male     Social Determinants of Health     Financial Resource Strain: Low Risk     Difficulty of Paying Living Expenses: Not hard at all   Food Insecurity: No Food Insecurity    Worried About Running Out of Food in the Last Year: Never true    Ran Out of Food in the Last Year: Never true   Transportation Needs: No Transportation Needs    Lack of Transportation (Medical): No    Lack of Transportation (Non-Medical): No       Family History   Problem Relation Age of Onset    Asthma Sister     Migraines Maternal Grandmother     Heart Disease Paternal Grandmother     Lung Disease Paternal Grandmother     Hypertension Paternal Grandmother     Hypertension Paternal Grandfather     Depression Mother         postpartum    Anxiety Mother     Alcohol abuse Father     Drug Abuse Maternal Uncle     Cancer Maternal Grandfather         lungs         Objective: There were no vitals taken for this visit. There is no height or weight on file to calculate BMI. General: Alert and oriented. No acute distress. Well nourished  HEENT :  Ears:TMs are normal. Canals are clear. Eyes: pupils equal, round, react to light and accommodation. Extra ocular movements intact. Nose: patent. Mouth and throat is clear. Neck:supple full range of motion no thyromegaly. Trachea midline, No carotid bruits. No significant lymphadenopathy  Lungs[de-identified] clear to auscultation without wheezes, rales, or rhonchi. Heart :RRR, S1 & S2 are normal intensity. No murmur; no gallop  Abdomen: bowel sounds active. No tenderness, guarding, rebound, masses, hepatic or spleen enlargement  Back: no CVA tenderness. Extremities: without clubbing, cyanosis, or edema  Pulses: radial and femoral pulses are normal  Neuro: HMF intact.  Cranial nerves II through XII grossly normal.  Motor: is 5 over 5 and symmetrical. Deep tendon reflexes: +2 equal  Psych:appropriate behavior, mood, affect and judgement. Results for orders placed or performed in visit on 10/26/22   CBC WITH AUTOMATED DIFF   Result Value Ref Range    WBC 10.1 3.4 - 10.8 x10E3/uL    RBC 4.26 3.77 - 5.28 x10E6/uL    HGB 12.4 11.1 - 15.9 g/dL    HCT 38.0 34.0 - 46.6 %    MCV 89 79 - 97 fL    MCH 29.1 26.6 - 33.0 pg    MCHC 32.6 31.5 - 35.7 g/dL    RDW 12.5 11.7 - 15.4 %    PLATELET 306 (H) 449 - 450 x10E3/uL    NEUTROPHILS 59 Not Estab. %    Lymphocytes 30 Not Estab. %    MONOCYTES 5 Not Estab. %    EOSINOPHILS 4 Not Estab. %    BASOPHILS 1 Not Estab. %    ABS. NEUTROPHILS 6.0 1.4 - 7.0 x10E3/uL    Abs Lymphocytes 3.1 0.7 - 3.1 x10E3/uL    ABS. MONOCYTES 0.5 0.1 - 0.9 x10E3/uL    ABS. EOSINOPHILS 0.4 0.0 - 0.4 x10E3/uL    ABS. BASOPHILS 0.1 0.0 - 0.2 x10E3/uL    IMMATURE GRANULOCYTES 1 Not Estab. %    ABS. IMM. GRANS. 0.1 0.0 - 0.1 x10E3/uL   RENAL FUNCTION PANEL   Result Value Ref Range    Glucose 195 (H) 70 - 99 mg/dL    BUN 9 6 - 20 mg/dL    Creatinine 0.69 0.57 - 1.00 mg/dL    eGFR 120 >59 mL/min/1.73    BUN/Creatinine ratio 13 9 - 23    Sodium 136 134 - 144 mmol/L    Potassium 4.0 3.5 - 5.2 mmol/L    Chloride 98 96 - 106 mmol/L    CO2 18 (L) 20 - 29 mmol/L    Calcium 9.4 8.7 - 10.2 mg/dL    Phosphorus 3.5 3.0 - 4.3 mg/dL    Albumin 4.6 3.9 - 5.0 g/dL   HEMOGLOBIN A1C WITH EAG   Result Value Ref Range    Hemoglobin A1c 6.6 (H) 4.8 - 5.6 %    Estimated average glucose 143 mg/dL   HEPATIC FUNCTION PANEL   Result Value Ref Range    Protein, total 7.6 6.0 - 8.5 g/dL    Bilirubin, total 0.2 0.0 - 1.2 mg/dL    Bilirubin, direct 0.19 0.00 - 0.40 mg/dL    Alk. phosphatase 102 44 - 121 IU/L    AST (SGOT) 208 (H) 0 - 40 IU/L    ALT (SGPT) 149 (H) 0 - 32 IU/L       No results found for this visit on 11/16/22. Assessment/ Plan:     1. Primary insomnia    - COMPLIANCE DRUG SCREEN/PRESCRIPTION MONITORING; Future  - COMPLIANCE DRUG SCREEN/PRESCRIPTION MONITORING    2. Controlled type 2 diabetes mellitus without complication, without long-term current use of insulin ([facility]        Orders Placed This Encounter    COMPLIANCE DRUG SCREEN/PRESCRIPTION MONITORING     Standing Status:   Future     Number of Occurrences:   1     Standing Expiration Date:   11/16/2023    ibuprofen (MOTRIN) 800 mg tablet     Sig: TAKE 1 TABLET BY MOUTH EVERY 8 HOURS AS NEEDED FOR MILD PAIN OR MODERATE PAIN FOR UP TO 7 DAYS TAKE WITH FOOD    HYDROcodone-acetaminophen (NORCO) 5-325 mg per tablet     Sig: TAKE 1 TABLET BY MOUTH EVERY 8 HOURS AS NEEDED FOR SEVERE PAIN FOR UP TO 5 DAYS    zolpidem (AMBIEN) 10 mg tablet     Sig: Take 1 Tablet by mouth nightly as needed for Sleep. Max Daily Amount: 10 mg. May take 1/2 if full dose not needed. Dispense:  30 Tablet     Refill:  0         Verbal and written instructions (see AVS) provided. Patient expresses understanding of diagnosis and treatment plan.     Health Maintenance Due   Topic Date Due    COVID-19 Vaccine (1) Never done               OLGA NorthP-C

## 2022-11-17 NOTE — ACP (ADVANCE CARE PLANNING)
Discussed importance of advanced medical directives with patient. Patient is capable of making decisions.   Zack Clark NP-C

## 2022-11-25 LAB — DRUGS UR: NORMAL

## 2022-11-28 DIAGNOSIS — J30.1 SEASONAL ALLERGIC RHINITIS DUE TO POLLEN: ICD-10-CM

## 2022-11-30 RX ORDER — LEVOCETIRIZINE DIHYDROCHLORIDE 5 MG/1
TABLET, FILM COATED ORAL
Qty: 30 TABLET | Refills: 0 | Status: SHIPPED | OUTPATIENT
Start: 2022-11-30

## 2022-12-21 ENCOUNTER — TELEPHONE (OUTPATIENT)
Dept: FAMILY MEDICINE CLINIC | Age: 32
End: 2022-12-21

## 2022-12-21 NOTE — TELEPHONE ENCOUNTER
ANTHONY to inform patient her omeprazole was approved from 12- thru 12-.  This  approval  was faxed to Clara Brantley

## 2022-12-27 DIAGNOSIS — J30.1 SEASONAL ALLERGIC RHINITIS DUE TO POLLEN: ICD-10-CM

## 2022-12-28 RX ORDER — LEVOCETIRIZINE DIHYDROCHLORIDE 5 MG/1
TABLET, FILM COATED ORAL
Qty: 30 TABLET | Refills: 0 | Status: SHIPPED | OUTPATIENT
Start: 2022-12-28

## 2023-01-11 ENCOUNTER — OFFICE VISIT (OUTPATIENT)
Dept: FAMILY MEDICINE CLINIC | Age: 33
End: 2023-01-11
Payer: MEDICAID

## 2023-01-11 VITALS
DIASTOLIC BLOOD PRESSURE: 76 MMHG | RESPIRATION RATE: 18 BRPM | OXYGEN SATURATION: 98 % | SYSTOLIC BLOOD PRESSURE: 118 MMHG | HEART RATE: 105 BPM | WEIGHT: 236 LBS | HEIGHT: 65 IN | BODY MASS INDEX: 39.32 KG/M2 | TEMPERATURE: 97.6 F

## 2023-01-11 DIAGNOSIS — R74.8 ELEVATED LIVER ENZYMES: ICD-10-CM

## 2023-01-11 DIAGNOSIS — R73.09 ELEVATED HEMOGLOBIN A1C: ICD-10-CM

## 2023-01-11 DIAGNOSIS — E11.9 CONTROLLED TYPE 2 DIABETES MELLITUS WITHOUT COMPLICATION, WITHOUT LONG-TERM CURRENT USE OF INSULIN (HCC): Primary | ICD-10-CM

## 2023-01-11 PROCEDURE — 99214 OFFICE O/P EST MOD 30 MIN: CPT | Performed by: NURSE PRACTITIONER

## 2023-01-11 RX ORDER — ERGOCALCIFEROL 1.25 MG/1
1.25 CAPSULE ORAL
COMMUNITY
Start: 2022-12-19 | End: 2023-01-15 | Stop reason: SDUPTHER

## 2023-01-11 NOTE — PROGRESS NOTES
Identified pt with two pt identifiers(name and ). Reviewed record in preparation for visit and have obtained necessary documentation. Chief Complaint   Patient presents with    Diabetes     Talk about meds for treatment      Vitals:    23 1559   BP: 118/76   Pulse: (!) 105   Resp: 18   Temp: 97.6 °F (36.4 °C)   TempSrc: Temporal   SpO2: 98%   Weight: 236 lb (107 kg)   Height: 5' 5\" (1.651 m)   PainSc:   0 - No pain       Coordination of Care Questionnaire:  :       1. \"Have you been to the ER, urgent care clinic since your last visit? Hospitalized since your last visit? \"  [facility] ER 2022    2. \"Have you seen or consulted any other health care providers outside of the [address] [place] [address] since your last visit? \" Yes Ortho multiple       3. For patients aged 39-70: Has the patient had a colonoscopy / FIT/ Cologuard? NA - based on age      If the patient is female:    4. For patients aged 41-77: Has the patient had a mammogram within the past 2 years? NA - based on age or sex      11. For patients aged 21-65: Has the patient had a pap smear?  Yes - no Care Gap present

## 2023-01-12 NOTE — ACP (ADVANCE CARE PLANNING)
Discussed importance of advanced medical directives with patient. Patient is capable of making decisions.  Nikhil Walton NP-C

## 2023-01-12 NOTE — PROGRESS NOTES
Subjective:     Bereket Miranda is a 29 y.o. female who presents today with the following:  Chief Complaint   Patient presents with    Diabetes     Talk about meds for treatment       Patient Active Problem List   Diagnosis Code    MOHAMUD (generalized anxiety disorder) F41.1    Recurrent major depressive disorder, in partial remission ([facility] F33.41    Insomnia disorder with non-sleep disorder mental comorbidity G47.00    Gastrointestinal problem R19.8         COMPLIANT WITH MEDICATION:   HTN; Denies chest pain, dyspnea, palpitations, headache and blurred vision. Blood pressure normotensive. DM; diet and lifestyle modification 3 months . Check HGBA1C     depression screening addressed not at risk    abuse screening addressed denies    learning assessment addressed reviewed nurses notes    fall risk addressed not at risk    HM: addressed She declines immunizations at this time. ROS:  Gen: denies fever, chills, fatigue, weight loss, weight gain  HEENT:denies blurry vision, nasal congestion, sore throat  Resp: denies dypsnea, cough, wheezing  CV: denies chest pain radiating to the jaws or arms, palpitations, lower extremity edema  Abd: denies nausea, vomiting, diarrhea, constipation  Neuro: denies numbness/tingling  Endo: denies polyuria, polydipsia, heat/cold intolerance  Heme: no lymphadenopathy    Allergies   Allergen Reactions    Food [[place]] Hives     Hives and tongue swelling to almonds    Pcn [Penicillins] Other (comments)     Family hx of allergies to PCN. Current Outpatient Medications:     ergocalciferol (ERGOCALCIFEROL) 1,250 mcg (50,000 unit) capsule, Take 1.25 mg by mouth every Monday and Thursday. , Disp: , Rfl:     levocetirizine (XYZAL) 5 mg tablet, Take 1 tablet by mouth once daily, Disp: 30 Tablet, Rfl: 0    zolpidem (AMBIEN) 10 mg tablet, TAKE 1 TABLET BY MOUTH NIGHTLY AS NEEDED FOR SLEEP - MAY TAKE 1/2 TABLET IF FULL DOSE NOT NEEDED, Disp: 30 Tablet, Rfl: 0    omeprazole (PRILOSEC) 20 mg capsule, Take 1 capsule by mouth once daily, Disp: 90 Capsule, Rfl: 0    Cymbalta 30 mg capsule, Take 1 Capsule by mouth daily. , Disp: 90 Capsule, Rfl: 2    minocycline (DYNACIN) 50 mg tablet, Take 1 Tablet by mouth two (2) times a day., Disp: 180 Tablet, Rfl: 1    ARIPiprazole (ABILIFY) 2 mg tablet, Take 1 Tablet by mouth daily. , Disp: 90 Tablet, Rfl: 2    albuterol (PROVENTIL HFA, VENTOLIN HFA, PROAIR HFA) 90 mcg/actuation inhaler, Take 2 Puffs by inhalation every four (4) hours as needed for Wheezing. Indications: asthma attack, Disp: 1 Inhaler, Rfl: 1    norethindrone-e estradiol-iron (Junel Fe 24) 1 mg-20 mcg (24)/75 mg (4) tab, Junel Fe 24 1 mg-20 mcg (24)/75 mg (4) tablet  Take 1 tablet by mouth once daily, Disp: , Rfl:     ibuprofen (MOTRIN) 800 mg tablet, TAKE 1 TABLET BY MOUTH EVERY 8 HOURS AS NEEDED FOR MILD PAIN OR MODERATE PAIN FOR UP TO 7 DAYS TAKE WITH FOOD (Patient not taking: Reported on 1/11/2023), Disp: , Rfl:     Past Medical History:   Diagnosis Date    ADD (attention deficit disorder)     Bronchial spasms     Celiac disease     Celiac disease 05/2019    Depression 5/9/2019    Fracture, ankle 06/14/2020    right    GERD (gastroesophageal reflux disease)     Herpes simplex virus (HSV) infection     Hx of herpes simplex infection 05/13/2009    lips    Panic        Past Surgical History:   Procedure Laterality Date    HX OTHER SURGICAL  05/2019    upper endoscopy with biopsy    HX OTHER SURGICAL  07/09/2021    surgery on right and left foot on toes.        Social History     Tobacco Use   Smoking Status Former   Smokeless Tobacco Never       Social History     Socioeconomic History    Marital status: SINGLE   Tobacco Use    Smoking status: Former    Smokeless tobacco: Never   Vaping Use    Vaping Use: Never used   Substance and Sexual Activity    Alcohol use: No    Drug use: No    Sexual activity: Yes     Partners: Male     Social Determinants of Health     Financial Resource Strain: Low Risk Difficulty of Paying Living Expenses: Not hard at all   Food Insecurity: No Food Insecurity    Worried About Running Out of Food in the Last Year: Never true    Ran Out of Food in the Last Year: Never true   Transportation Needs: No Transportation Needs    Lack of Transportation (Medical): No    Lack of Transportation (Non-Medical): No       Family History   Problem Relation Age of Onset    Asthma Sister     Migraines Maternal Grandmother     Heart Disease Paternal Grandmother     Lung Disease Paternal Grandmother     Hypertension Paternal Grandmother     Hypertension Paternal Grandfather     Depression Mother         postpartum    Anxiety Mother     Alcohol abuse Father     Drug Abuse Maternal Uncle     Cancer Maternal Grandfather         lungs         Objective:     Visit Vitals  /76 (BP 1 Location: Right arm, BP Patient Position: Sitting, BP Cuff Size: Large adult)   Pulse (!) 105   Temp 97.6 °F (36.4 °C) (Temporal)   Resp 18   Ht 5' 5\" (1.651 m)   Wt 236 lb (107 kg)   SpO2 98%   BMI 39.27 kg/m²     Body mass index is 39.27 kg/m². General: Alert and oriented. No acute distress. Well nourished  HEENT :  Ears:TMs are normal. Canals are clear. Eyes: pupils equal, round, react to light and accommodation. Extra ocular movements intact. Nose: patent. Mouth and throat is clear. Neck:supple full range of motion no thyromegaly. Trachea midline, No carotid bruits. No significant lymphadenopathy  Lungs[de-identified] clear to auscultation without wheezes, rales, or rhonchi. Heart :RRR, S1 & S2 are normal intensity. No murmur; no gallop  Abdomen: bowel sounds active. No tenderness, guarding, rebound, masses, hepatic or spleen enlargement  Back: no CVA tenderness. Extremities: without clubbing, cyanosis, or edema. Right lower extremity in a boot. Pulses: radial and femoral pulses are normal  Neuro: HMF intact.  Cranial nerves II through XII grossly normal.  Motor: is 5 over 5 and symmetrical.   Deep tendon reflexes: +2 equal  Psych:appropriate behavior, mood, affect and judgement. Results for orders placed or performed in visit on 11/16/22   COMPLIANCE DRUG SCREEN/PRESCRIPTION MONITORING   Result Value Ref Range    Summary Note        No results found for this visit on 01/11/23. Assessment/ Plan:     1. Elevated liver enzymes  Recheck liver enzymes. If elevated imaging and referral to GI.  - CBC WITH AUTOMATED DIFF; Future  - LIPID PANEL; Future  - METABOLIC PANEL, COMPREHENSIVE; Future  - COLLECTION VENOUS BLOOD,VENIPUNCTURE; Future  - HEMOGLOBIN A1C WITH EAG; Future  - CBC WITH AUTOMATED DIFF  - LIPID PANEL  - METABOLIC PANEL, COMPREHENSIVE  - HEMOGLOBIN A1C WITH EAG      2. Controlled type 2 diabetes mellitus without complication, without long-term current use of insulin ([facility]  Start metformin if indicated  - CBC WITH AUTOMATED DIFF; Future  - LIPID PANEL; Future  - METABOLIC PANEL, COMPREHENSIVE; Future  - COLLECTION VENOUS BLOOD,VENIPUNCTURE; Future  - HEMOGLOBIN A1C WITH EAG; Future  - CBC WITH AUTOMATED DIFF  - LIPID PANEL  - METABOLIC PANEL, COMPREHENSIVE  - HEMOGLOBIN A1C WITH EAG    3. Elevated hemoglobin A1c  Start metformin if indicated  Establish with Dr. Michael Berger endocrinologist in March. - CBC WITH AUTOMATED DIFF; Future  - LIPID PANEL; Future  - METABOLIC PANEL, COMPREHENSIVE; Future  - COLLECTION VENOUS BLOOD,VENIPUNCTURE; Future  - HEMOGLOBIN A1C WITH EAG;  Future  - CBC WITH AUTOMATED DIFF  - LIPID PANEL  - METABOLIC PANEL, COMPREHENSIVE  - HEMOGLOBIN A1C WITH EAG      Orders Placed This Encounter    COLLECTION VENOUS BLOOD,VENIPUNCTURE     Standing Status:   Future     Standing Expiration Date:   2/11/2023    CBC WITH AUTOMATED DIFF     Standing Status:   Future     Number of Occurrences:   1     Standing Expiration Date:   2/11/2023    LIPID PANEL     Standing Status:   Future     Number of Occurrences:   1     Standing Expiration Date:   3/43/5791    METABOLIC PANEL, COMPREHENSIVE     Standing Status: Future     Number of Occurrences:   1     Standing Expiration Date:   2/11/2023    HEMOGLOBIN A1C WITH EAG     Standing Status:   Future     Number of Occurrences:   1     Standing Expiration Date:   2/11/2023    ergocalciferol (ERGOCALCIFEROL) 1,250 mcg (50,000 unit) capsule     Sig: Take 1.25 mg by mouth every Monday and Thursday. Verbal and written instructions (see AVS) provided. Patient expresses understanding of diagnosis and treatment plan.     Health Maintenance Due   Topic Date Due    COVID-19 Vaccine (1) Never done    Pneumococcal 0-64 years (1 - PCV) Never done    Foot Exam Q1  Never done    Lipid Screen  Never done    Diabetic Alb to Cr ratio (uACR) test  Never done    GFR test (Diabetes, CKD 3-4, OR last GFR 15-59)  Never done    Hepatitis B Vaccine (1 of 3 - Risk 3-dose series) Never done               JAZMIN Penn

## 2023-01-13 ENCOUNTER — HOSPITAL ENCOUNTER (OUTPATIENT)
Dept: SLEEP MEDICINE | Age: 33
End: 2023-01-13
Payer: MEDICAID

## 2023-01-13 DIAGNOSIS — G47.33 OSA (OBSTRUCTIVE SLEEP APNEA): ICD-10-CM

## 2023-01-13 LAB
ALBUMIN SERPL-MCNC: 4.7 G/DL (ref 3.9–5)
ALBUMIN/GLOB SERPL: 1.4 {RATIO} (ref 1.2–2.2)
ALP SERPL-CCNC: 86 IU/L (ref 44–121)
ALT SERPL-CCNC: 209 IU/L (ref 0–32)
AST SERPL-CCNC: 201 IU/L (ref 0–40)
BASOPHILS # BLD AUTO: 0.1 X10E3/UL (ref 0–0.2)
BASOPHILS NFR BLD AUTO: 1 %
BILIRUB SERPL-MCNC: 0.3 MG/DL (ref 0–1.2)
BUN SERPL-MCNC: 12 MG/DL (ref 6–20)
BUN/CREAT SERPL: 16 (ref 9–23)
CALCIUM SERPL-MCNC: 10.3 MG/DL (ref 8.7–10.2)
CHLORIDE SERPL-SCNC: 98 MMOL/L (ref 96–106)
CHOLEST SERPL-MCNC: 217 MG/DL (ref 100–199)
CO2 SERPL-SCNC: 20 MMOL/L (ref 20–29)
CREAT SERPL-MCNC: 0.77 MG/DL (ref 0.57–1)
EGFR: 106 ML/MIN/1.73
EOSINOPHIL # BLD AUTO: 0.3 X10E3/UL (ref 0–0.4)
EOSINOPHIL NFR BLD AUTO: 3 %
ERYTHROCYTE [DISTWIDTH] IN BLOOD BY AUTOMATED COUNT: 12.7 % (ref 11.7–15.4)
EST. AVERAGE GLUCOSE BLD GHB EST-MCNC: 140 MG/DL
GLOBULIN SER CALC-MCNC: 3.3 G/DL (ref 1.5–4.5)
GLUCOSE SERPL-MCNC: 92 MG/DL (ref 70–99)
HBA1C MFR BLD: 6.5 % (ref 4.8–5.6)
HCT VFR BLD AUTO: 40.5 % (ref 34–46.6)
HDLC SERPL-MCNC: 16 MG/DL
HGB BLD-MCNC: 13.3 G/DL (ref 11.1–15.9)
IMM GRANULOCYTES # BLD AUTO: 0 X10E3/UL (ref 0–0.1)
IMM GRANULOCYTES NFR BLD AUTO: 0 %
LDLC SERPL CALC-MCNC: 75 MG/DL (ref 0–99)
LYMPHOCYTES # BLD AUTO: 3.7 X10E3/UL (ref 0.7–3.1)
LYMPHOCYTES NFR BLD AUTO: 33 %
MCH RBC QN AUTO: 29.6 PG (ref 26.6–33)
MCHC RBC AUTO-ENTMCNC: 32.8 G/DL (ref 31.5–35.7)
MCV RBC AUTO: 90 FL (ref 79–97)
MONOCYTES # BLD AUTO: 0.6 X10E3/UL (ref 0.1–0.9)
MONOCYTES NFR BLD AUTO: 5 %
NEUTROPHILS # BLD AUTO: 6.3 X10E3/UL (ref 1.4–7)
NEUTROPHILS NFR BLD AUTO: 58 %
PLATELET # BLD AUTO: 475 X10E3/UL (ref 150–450)
POTASSIUM SERPL-SCNC: 4.6 MMOL/L (ref 3.5–5.2)
PROT SERPL-MCNC: 8 G/DL (ref 6–8.5)
RBC # BLD AUTO: 4.5 X10E6/UL (ref 3.77–5.28)
SODIUM SERPL-SCNC: 140 MMOL/L (ref 134–144)
TRIGL SERPL-MCNC: 792 MG/DL (ref 0–149)
VLDLC SERPL CALC-MCNC: 126 MG/DL (ref 5–40)
WBC # BLD AUTO: 11 X10E3/UL (ref 3.4–10.8)

## 2023-01-13 PROCEDURE — 95811 POLYSOM 6/>YRS CPAP 4/> PARM: CPT | Performed by: INTERNAL MEDICINE

## 2023-01-13 RX ORDER — OMEGA-3 FATTY ACIDS/FISH OIL 300-1000MG
1 CAPSULE ORAL DAILY
Qty: 145 CAPSULE | Refills: 3 | Status: SHIPPED | OUTPATIENT
Start: 2023-01-13

## 2023-01-13 RX ORDER — ATORVASTATIN CALCIUM 20 MG/1
20 TABLET, FILM COATED ORAL DAILY
Qty: 90 TABLET | Refills: 1 | Status: SHIPPED | OUTPATIENT
Start: 2023-01-13

## 2023-01-13 NOTE — PROGRESS NOTES
Spoke to patient and gave her the results. Patient is very confused and has a lot of questions.   She would like you to call her Monday when you get a chance

## 2023-01-13 NOTE — TELEPHONE ENCOUNTER
Pt called regarding aripiprazole and minocycline. States that she thought they were supposed to be refilled and they still have not been called in.

## 2023-01-14 VITALS
BODY MASS INDEX: 39.32 KG/M2 | WEIGHT: 236 LBS | HEART RATE: 126 BPM | SYSTOLIC BLOOD PRESSURE: 138 MMHG | TEMPERATURE: 97.7 F | OXYGEN SATURATION: 97 % | DIASTOLIC BLOOD PRESSURE: 93 MMHG | HEIGHT: 65 IN

## 2023-01-14 NOTE — PROGRESS NOTES
[address] [facility]. [place], [facility]  Tel.  [phone]  Fax. [address] [facility] [address]  [place], [address] [facility]  Tel.  [phone]  Fax. [phone] [address]  Tel.  [phone]  Fax. [phone]     Sleep Study Technical Notes        PRE-Test:  Bereket Miranda (: 1990) is here for a Titration study. Order and chart reviewed by tech. Patient is a 29year old female with history of known VANESSA, current APAP user, excessive daytime sleepiness, restless sleep, and difficulty tolerating CPAP. She was originally diagnosed with Mild VANESSA after an HST done 2022. The Overall AHI was 13.6. Her last download done 11- shows APAP 5.0 cm to 12.0 cm, usage 4.17, Average pressure 8.0 cm, Overall AHI 0.3. Patient was greeted and screening questions asked. The patient was taken directly to his/her room. Vitals:  Temperature (97.7)   BP (138/93)   SaO2 (97%)   Weight per patient (236 lbs)  Procedure explained to the patient and questions were answered. The patient expressed understanding of the procedure. Electrodes were applied without incident. The patient was placed in bed and the study was started. PAP mask acclimation for 3 min.   Patient did tolerate mask (last mask she received from her [employer])  Sleep aid taken:  NA    Acquisition Notes:  Lights off: 9:08 PM    Respiratory events: RERAS, HYPOPNEAS, AND CENTRAL APNEAS  ECG:  NORMAL SINUS RHYTHM  Snoring:  NO SNORING DISPLAYED OR HEARD  PAP titration: CPAP 5.0 CM TO 7.0 CM  Desensitization Mask(s) Used: RESMED AIR FIT N30I SMALL   Other comments: OVERALL AHI AT FINAL PRESSURE = 0.8                                    AVERAGE SA02 97% AND LOW SA02 92%                                   LMI = 14.5  Patient had caregiver in attendance:  NO  Patient watched TV or on phone after lights out:  YES - 1 HR 10 MIN  Patient slept with positional therapy:  NO  Patient slept with head of bed elevated:  NO  Sleep stages:  1, 2, 3, AND REM  Sleep positions:  SUPINE, PRONE AND ON HER RIGHT SIDE  Patient wore an oral appliance:  NO  Patient to bathroom 2 times  Pediatric Patient: NA  Parent accompanied patient: NA  Parent slept in bed with patient: NA    POST Test:  Patient was awakened at 5:56 am.  Electrodes were removed. The patient was discharged after answering the Post Questionnaire. Patient stated that she was alert and ok to drive. Equipment and room cleaned per infection control policy.

## 2023-01-15 DIAGNOSIS — E55.9 MILD VITAMIN D DEFICIENCY: Primary | ICD-10-CM

## 2023-01-15 DIAGNOSIS — F51.04 PSYCHOPHYSIOLOGICAL INSOMNIA: ICD-10-CM

## 2023-01-15 DIAGNOSIS — F41.9 ANXIETY: ICD-10-CM

## 2023-01-15 RX ORDER — MINOCYCLINE HYDROCHLORIDE 50 MG/1
50 TABLET ORAL 2 TIMES DAILY
Qty: 180 TABLET | Refills: 1 | Status: SHIPPED | OUTPATIENT
Start: 2023-01-15

## 2023-01-15 RX ORDER — ARIPIPRAZOLE 2 MG/1
2 TABLET ORAL DAILY
Qty: 90 TABLET | Refills: 2 | Status: SHIPPED | OUTPATIENT
Start: 2023-01-15

## 2023-01-15 RX ORDER — ERGOCALCIFEROL 1.25 MG/1
50000 CAPSULE ORAL
Qty: 4 CAPSULE | Refills: 1 | Status: SHIPPED | OUTPATIENT
Start: 2023-01-15 | End: 2023-03-16

## 2023-01-17 ENCOUNTER — TELEPHONE (OUTPATIENT)
Dept: SLEEP MEDICINE | Age: 33
End: 2023-01-17

## 2023-01-20 ENCOUNTER — DOCUMENTATION ONLY (OUTPATIENT)
Dept: SLEEP MEDICINE | Age: 33
End: 2023-01-20

## 2023-01-20 PROBLEM — G47.33 OBSTRUCTIVE SLEEP APNEA: Status: ACTIVE | Noted: 2023-01-20

## 2023-01-23 ENCOUNTER — TELEPHONE (OUTPATIENT)
Dept: SLEEP MEDICINE | Age: 33
End: 2023-01-23

## 2023-01-23 DIAGNOSIS — F51.04 PSYCHOPHYSIOLOGICAL INSOMNIA: ICD-10-CM

## 2023-01-23 DIAGNOSIS — F41.9 ANXIETY: ICD-10-CM

## 2023-01-23 NOTE — TELEPHONE ENCOUNTER
Patient wants to know why her order was sent to [facility] when she already has a PAP device from [facility].

## 2023-01-23 NOTE — TELEPHONE ENCOUNTER
Reviewed results. Pt is using "[employer] 5, not ABC. Please send order to [employer]. Schedule 1st adherence.

## 2023-01-24 RX ORDER — ZOLPIDEM TARTRATE 10 MG/1
TABLET ORAL
Qty: 90 TABLET | Refills: 0 | Status: SHIPPED | OUTPATIENT
Start: 2023-01-24

## 2023-01-27 ENCOUNTER — DOCUMENTATION ONLY (OUTPATIENT)
Dept: SLEEP MEDICINE | Age: 33
End: 2023-01-27

## 2023-01-27 NOTE — PROGRESS NOTES
Pap order faxed to [address] [facility] on 01/27/2023.

## 2023-01-30 DIAGNOSIS — J30.1 SEASONAL ALLERGIC RHINITIS DUE TO POLLEN: ICD-10-CM

## 2023-01-30 RX ORDER — LEVOCETIRIZINE DIHYDROCHLORIDE 5 MG/1
TABLET, FILM COATED ORAL
Qty: 30 TABLET | Refills: 0 | Status: SHIPPED | OUTPATIENT
Start: 2023-01-30

## 2023-03-01 ENCOUNTER — OFFICE VISIT (OUTPATIENT)
Dept: FAMILY MEDICINE CLINIC | Age: 33
End: 2023-03-01
Payer: MEDICAID

## 2023-03-01 DIAGNOSIS — E78.1 HYPERTRIGLYCERIDEMIA: ICD-10-CM

## 2023-03-01 DIAGNOSIS — R73.09 ELEVATED HEMOGLOBIN A1C: ICD-10-CM

## 2023-03-01 DIAGNOSIS — K90.0 CELIAC DISEASE: ICD-10-CM

## 2023-03-01 DIAGNOSIS — R74.8 ELEVATED LIVER ENZYMES: Primary | ICD-10-CM

## 2023-03-01 DIAGNOSIS — E11.9 COMPREHENSIVE DIABETIC FOOT EXAMINATION, TYPE 2 DM, ENCOUNTER FOR (HCC): ICD-10-CM

## 2023-03-01 DIAGNOSIS — E11.9 CONTROLLED TYPE 2 DIABETES MELLITUS WITHOUT COMPLICATION, WITHOUT LONG-TERM CURRENT USE OF INSULIN (HCC): ICD-10-CM

## 2023-03-01 LAB
ALBUMIN UR QL STRIP: 80 MG/L
CREATININE, URINE POC: 300 MG/DL
MICROALBUMIN/CREAT RATIO POC: <30 MG/G

## 2023-03-01 PROCEDURE — 3044F HG A1C LEVEL LT 7.0%: CPT | Performed by: NURSE PRACTITIONER

## 2023-03-01 PROCEDURE — 82044 UR ALBUMIN SEMIQUANTITATIVE: CPT | Performed by: NURSE PRACTITIONER

## 2023-03-01 PROCEDURE — 99214 OFFICE O/P EST MOD 30 MIN: CPT | Performed by: NURSE PRACTITIONER

## 2023-03-01 NOTE — PROGRESS NOTES
Chief Complaint   Patient presents with    Diabetes     YES Answers must have Comments  1. \"Have you been to the ER, urgent care clinic since your last visit? Hospitalized since your last visit? \"    [] YES   [x] NO       2. Have you seen or consulted any other health care providers outside of [address] [place] [address] since your last visit?     [] YES   [x] NO       3. For patients aged 39-70: Have you had a colorectal cancer screening such as a colonoscopy/FIT/Cologuard? Nurse/CMA to request records if not in chart   [] YES   [] NO   [x] NA, based on age    If the patient is female:      4. For female patients aged 41-77: Dylan Lacy you had a mammogram in the last two years?  Nurse/CMA to request records if not in chart   [] YES   [] NO   [x] NA, based on age    11. For female patients aged 21-65: Dylan Lacy you had a pap smear?   Nurse/CMA to request records if not in chart   [x] YES 2-18-21 [facility]  [] NO  [] NA, based on age

## 2023-03-02 LAB
25(OH)D3+25(OH)D2 SERPL-MCNC: 32.8 NG/ML (ref 30–100)
ALBUMIN SERPL-MCNC: 4.4 G/DL (ref 3.9–5)
ALBUMIN/GLOB SERPL: 1.4 {RATIO} (ref 1.2–2.2)
ALP SERPL-CCNC: 105 IU/L (ref 44–121)
ALT SERPL-CCNC: 293 IU/L (ref 0–32)
AST SERPL-CCNC: 502 IU/L (ref 0–40)
BASOPHILS # BLD AUTO: 0.1 X10E3/UL (ref 0–0.2)
BASOPHILS NFR BLD AUTO: 1 %
BILIRUB SERPL-MCNC: 0.3 MG/DL (ref 0–1.2)
BUN SERPL-MCNC: 11 MG/DL (ref 6–20)
BUN/CREAT SERPL: 15 (ref 9–23)
CALCIUM SERPL-MCNC: 9.8 MG/DL (ref 8.7–10.2)
CHLORIDE SERPL-SCNC: 102 MMOL/L (ref 96–106)
CHOLEST SERPL-MCNC: 158 MG/DL (ref 100–199)
CO2 SERPL-SCNC: 20 MMOL/L (ref 20–29)
CREAT SERPL-MCNC: 0.71 MG/DL (ref 0.57–1)
EGFRCR SERPLBLD CKD-EPI 2021: 117 ML/MIN/1.73
EOSINOPHIL # BLD AUTO: 0.3 X10E3/UL (ref 0–0.4)
EOSINOPHIL NFR BLD AUTO: 3 %
ERYTHROCYTE [DISTWIDTH] IN BLOOD BY AUTOMATED COUNT: 12.2 % (ref 11.7–15.4)
EST. AVERAGE GLUCOSE BLD GHB EST-MCNC: 140 MG/DL
GLOBULIN SER CALC-MCNC: 3.1 G/DL (ref 1.5–4.5)
GLUCOSE SERPL-MCNC: 140 MG/DL (ref 70–99)
HBA1C MFR BLD: 6.5 % (ref 4.8–5.6)
HCT VFR BLD AUTO: 37.9 % (ref 34–46.6)
HDLC SERPL-MCNC: 14 MG/DL
HGB BLD-MCNC: 12.5 G/DL (ref 11.1–15.9)
IMM GRANULOCYTES # BLD AUTO: 0 X10E3/UL (ref 0–0.1)
IMM GRANULOCYTES NFR BLD AUTO: 0 %
LDLC SERPL CALC-MCNC: ABNORMAL MG/DL (ref 0–99)
LYMPHOCYTES # BLD AUTO: 2.7 X10E3/UL (ref 0.7–3.1)
LYMPHOCYTES NFR BLD AUTO: 31 %
MCH RBC QN AUTO: 29.3 PG (ref 26.6–33)
MCHC RBC AUTO-ENTMCNC: 33 G/DL (ref 31.5–35.7)
MCV RBC AUTO: 89 FL (ref 79–97)
MONOCYTES # BLD AUTO: 0.4 X10E3/UL (ref 0.1–0.9)
MONOCYTES NFR BLD AUTO: 4 %
NEUTROPHILS # BLD AUTO: 5.2 X10E3/UL (ref 1.4–7)
NEUTROPHILS NFR BLD AUTO: 61 %
PLATELET # BLD AUTO: 473 X10E3/UL (ref 150–450)
POTASSIUM SERPL-SCNC: 3.9 MMOL/L (ref 3.5–5.2)
PROT SERPL-MCNC: 7.5 G/DL (ref 6–8.5)
RBC # BLD AUTO: 4.27 X10E6/UL (ref 3.77–5.28)
SODIUM SERPL-SCNC: 139 MMOL/L (ref 134–144)
T4 FREE SERPL-MCNC: 1.13 NG/DL (ref 0.82–1.77)
T4 SERPL-MCNC: 10.6 UG/DL (ref 4.5–12)
TRIGL SERPL-MCNC: 989 MG/DL (ref 0–149)
TSH SERPL DL<=0.005 MIU/L-ACNC: 1.55 UIU/ML (ref 0.45–4.5)
VLDLC SERPL CALC-MCNC: ABNORMAL MG/DL (ref 5–40)
WBC # BLD AUTO: 8.7 X10E3/UL (ref 3.4–10.8)

## 2023-03-02 RX ORDER — ATORVASTATIN CALCIUM 40 MG/1
40 TABLET, FILM COATED ORAL DAILY
Qty: 90 TABLET | Refills: 1 | Status: SHIPPED | OUTPATIENT
Start: 2023-03-02

## 2023-03-03 VITALS
OXYGEN SATURATION: 98 % | BODY MASS INDEX: 39.32 KG/M2 | HEART RATE: 99 BPM | DIASTOLIC BLOOD PRESSURE: 74 MMHG | RESPIRATION RATE: 18 BRPM | SYSTOLIC BLOOD PRESSURE: 110 MMHG | HEIGHT: 65 IN | TEMPERATURE: 97.1 F | WEIGHT: 236 LBS

## 2023-03-03 NOTE — ACP (ADVANCE CARE PLANNING)
Discussed importance of advanced medical directives with patient. Patient is capable of making decisions.  Dung Richardson NP-C

## 2023-03-03 NOTE — PROGRESS NOTES
Subjective:     Jullie Cheadle is a 29 y.o. female who presents today with the following:  Chief Complaint   Patient presents with    Diabetes    Hyperlipidemia       Patient Active Problem List   Diagnosis Code    MOHAMUD (generalized anxiety disorder) F41.1    Recurrent major depressive disorder, in partial remission ([facility] [address] F33.41    Insomnia disorder with non-sleep disorder mental comorbidity G47.00    Gastrointestinal problem R19.8    Obstructive sleep apnea G47.33         COMPLIANT WITH MEDICATION:   HTN; Denies chest pain, dyspnea, palpitations, headache and blurred vision. Blood pressure normotensive. depression screening addressed not at risk    abuse screening addressed denies    learning assessment addressed reviewed nurses notes    fall risk addressed not at risk    HM: addressed she declines pneumonia vaccine today    ROS:  Gen: denies fever, chills, fatigue, weight loss, weight gain  HEENT:denies blurry vision, nasal congestion, sore throat  Resp: denies dypsnea, cough, wheezing  CV: denies chest pain radiating to the jaws or arms, palpitations, lower extremity edema  Abd: denies nausea, vomiting, diarrhea, constipation  Neuro: denies numbness/tingling  Endo: denies polyuria, polydipsia, heat/cold intolerance  Heme: no lymphadenopathy    Allergies   Allergen Reactions    Food [[place]] Hives     Hives and tongue swelling to almonds    Pcn [Penicillins] Other (comments)     Family hx of allergies to PCN. Current Outpatient Medications:     levocetirizine (XYZAL) 5 mg tablet, Take 1 tablet by mouth once daily, Disp: 30 Tablet, Rfl: 0    zolpidem (AMBIEN) 10 mg tablet, TAKE 1/2 TO 1 (ONE-HALF TO ONE) TABLET BY MOUTH NIGHTLY AS NEEDED FOR SLEEP, Disp: 90 Tablet, Rfl: 0    metFORMIN (GLUCOPHAGE) 500 mg tablet, Take 1 Tablet by mouth daily (with breakfast). , Disp: 90 Tablet, Rfl: 1    ARIPiprazole (ABILIFY) 2 mg tablet, Take 1 Tablet by mouth daily. , Disp: 90 Tablet, Rfl: 2    ergocalciferol (ERGOCALCIFEROL) 1,250 mcg (50,000 unit) capsule, Take 1 Capsule by mouth every seven (7) days for 60 days. , Disp: 4 Capsule, Rfl: 1    fish oil-omega-3-DHA-EPA (Fish OiL) 300-1,000 mg capsule, Take 1 Capsule by mouth daily. Indications: high amount of triglyceride in the blood, Disp: 145 Capsule, Rfl: 3    omeprazole (PRILOSEC) 20 mg capsule, Take 1 capsule by mouth once daily, Disp: 90 Capsule, Rfl: 0    ibuprofen (MOTRIN) 800 mg tablet, , Disp: , Rfl:     Cymbalta 30 mg capsule, Take 1 Capsule by mouth daily. , Disp: 90 Capsule, Rfl: 2    albuterol (PROVENTIL HFA, VENTOLIN HFA, PROAIR HFA) 90 mcg/actuation inhaler, Take 2 Puffs by inhalation every four (4) hours as needed for Wheezing. Indications: asthma attack, Disp: 1 Inhaler, Rfl: 1    norethindrone-e estradiol-iron (Junel Fe 24) 1 mg-20 mcg (24)/75 mg (4) tab, Junel Fe 24 1 mg-20 mcg (24)/75 mg (4) tablet  Take 1 tablet by mouth once daily, Disp: , Rfl:     atorvastatin (LIPITOR) 40 mg tablet, Take 1 Tablet by mouth daily. Indications: high amount of triglyceride in the blood, Disp: 90 Tablet, Rfl: 1    minocycline (DYNACIN) 50 mg tablet, Take 1 Tablet by mouth two (2) times a day., Disp: 180 Tablet, Rfl: 1    Past Medical History:   Diagnosis Date    ADD (attention deficit disorder)     Bronchial spasms     Celiac disease     Celiac disease 05/2019    Depression 5/9/2019    Fracture, ankle 06/14/2020    right    GERD (gastroesophageal reflux disease)     Herpes simplex virus (HSV) infection     Hx of herpes simplex infection 05/13/2009    lips    Panic        Past Surgical History:   Procedure Laterality Date    HX OTHER SURGICAL  05/2019    upper endoscopy with biopsy    HX OTHER SURGICAL  07/09/2021    surgery on right and left foot on toes.        Social History     Tobacco Use   Smoking Status Former   Smokeless Tobacco Never       Social History     Socioeconomic History    Marital status: SINGLE   Tobacco Use    Smoking status: Former    Smokeless tobacco: Never   Vaping Use    Vaping Use: Never used   Substance and Sexual Activity    Alcohol use: No    Drug use: No    Sexual activity: Yes     Partners: Male     Social Determinants of Health     Financial Resource Strain: Low Risk     Difficulty of Paying Living Expenses: Not hard at all   Food Insecurity: No Food Insecurity    Worried About [address] in the Last Year: Never true    [facility] [place] in the Last Year: Never true   Transportation Needs: No Transportation Needs    Lack of Transportation (Medical): No    Lack of Transportation (Non-Medical):  No   Physical Activity: Inactive    Days of Exercise per Week: 0 days    Minutes of Exercise per Session: 0 min   Stress: Stress Concern Present    Feeling of Stress : Rather much   Social Connections: Socially Isolated    Frequency of Communication with Friends and Family: More than three times a week    Frequency of Social Gatherings with Friends and Family: Once a week    Attends Jewish Services: Never    Active Member of Clubs or Organizations: No    Attends Club or Organization Meetings: Never    Marital Status: Never    Housing Stability: Low Risk     Unable to Pay for Housing in the Last Year: No    Number of [place] in the Last Year: 1    Unstable Housing in the Last Year: No       Family History   Problem Relation Age of Onset    Asthma Sister     Migraines Maternal Grandmother     Heart Disease Paternal Grandmother     Lung Disease Paternal Grandmother     Hypertension Paternal Grandmother     Hypertension Paternal Grandfather     Depression Mother         postpartum    Anxiety Mother     Alcohol abuse Father     Drug Abuse Maternal Uncle     Cancer Maternal Grandfather         lungs         Objective:     Visit Vitals  BP (!) 122/90 (BP 1 Location: Right upper arm, BP Patient Position: Sitting, BP Cuff Size: Large adult)   Pulse 99   Temp 97.1 °F (36.2 °C) (Temporal)   Resp 18   Ht 5' 5\" (1.651 m)   Wt 236 lb (107 kg)   SpO2 98%   BMI 39.27 kg/m²     Body mass index is 39.27 kg/m². General: Alert and oriented. No acute distress. Well nourished  HEENT :  Ears:TMs are normal. Canals are clear. Eyes: pupils equal, round, react to light and accommodation. Extra ocular movements intact. Nose: patent. Mouth and throat is clear. Neck:supple full range of motion no thyromegaly. Trachea midline, No carotid bruits. No significant lymphadenopathy  Lungs[de-identified] clear to auscultation without wheezes, rales, or rhonchi. Heart :RRR, S1 & S2 are normal intensity. No murmur; no gallop  Abdomen: bowel sounds active. No tenderness, guarding, rebound, masses, hepatic or spleen enlargement  Back: no CVA tenderness. Extremities: without clubbing, cyanosis, or edema. Right lower extremity in walking cast  Pulses: radial and femoral pulses are normal  Neuro: HMF intact. Cranial nerves II through XII grossly normal.  Motor: is 5 over 5 and symmetrical.   Deep tendon reflexes: +2 equal  Psych:appropriate behavior, mood, affect and judgement.         Diabetic foot exam performed by Ramón Cesar NP     Measurement  Response Nurse Comment Physician Comment   Monofilament  R - normal sensation with micro filament  L - normal sensation with micro filament     Pulse DP R - present  L - present     Pulse TP R - present  L - present     Structural deformity R - None  L - None     Skin Integrity / Deformity R - None  L - None        Reviewed by:  Juliane Hartman NP-C         Results for orders placed or performed in visit on 03/01/23   CBC WITH AUTOMATED DIFF   Result Value Ref Range    WBC 8.7 3.4 - 10.8 x10E3/uL    RBC 4.27 3.77 - 5.28 x10E6/uL    HGB 12.5 11.1 - 15.9 g/dL    HCT 37.9 34.0 - 46.6 %    MCV 89 79 - 97 fL    MCH 29.3 26.6 - 33.0 pg    MCHC 33.0 31.5 - 35.7 g/dL    RDW 12.2 11.7 - 15.4 %    PLATELET 758 (H) 628 - 450 x10E3/uL    NEUTROPHILS 61 Not Estab. %    Lymphocytes 31 Not Estab. %    MONOCYTES 4 Not Estab. %    EOSINOPHILS 3 Not Estab. %    BASOPHILS 1 Not Estab. %    ABS. NEUTROPHILS 5.2 1.4 - 7.0 x10E3/uL    Abs Lymphocytes 2.7 0.7 - 3.1 x10E3/uL    ABS. MONOCYTES 0.4 0.1 - 0.9 x10E3/uL    ABS. EOSINOPHILS 0.3 0.0 - 0.4 x10E3/uL    ABS. BASOPHILS 0.1 0.0 - 0.2 x10E3/uL    IMMATURE GRANULOCYTES 0 Not Estab. %    ABS. IMM. GRANS. 0.0 0.0 - 0.1 x10E3/uL   HEMOGLOBIN A1C WITH EAG   Result Value Ref Range    Hemoglobin A1c 6.5 (H) 4.8 - 5.6 %    Estimated average glucose 140 mg/dL   LIPID PANEL   Result Value Ref Range    Cholesterol, total 158 100 - 199 mg/dL    Triglyceride 989 (HH) 0 - 149 mg/dL    HDL Cholesterol 14 (L) >39 mg/dL    VLDL, calculated Comment (A) 5 - 40 mg/dL    LDL, calculated Comment (A) 0 - 99 mg/dL   METABOLIC PANEL, COMPREHENSIVE   Result Value Ref Range    Glucose 140 (H) 70 - 99 mg/dL    BUN 11 6 - 20 mg/dL    Creatinine 0.71 0.57 - 1.00 mg/dL    eGFR 117 >59 mL/min/1.73    BUN/Creatinine ratio 15 9 - 23    Sodium 139 134 - 144 mmol/L    Potassium 3.9 3.5 - 5.2 mmol/L    Chloride 102 96 - 106 mmol/L    CO2 20 20 - 29 mmol/L    Calcium 9.8 8.7 - 10.2 mg/dL    Protein, total 7.5 6.0 - 8.5 g/dL    Albumin 4.4 3.9 - 5.0 g/dL    GLOBULIN, TOTAL 3.1 1.5 - 4.5 g/dL    A-G Ratio 1.4 1.2 - 2.2    Bilirubin, total 0.3 0.0 - 1.2 mg/dL    Alk.  phosphatase 105 44 - 121 IU/L    AST (SGOT) 502 (HH) 0 - 40 IU/L    ALT (SGPT) 293 (H) 0 - 32 IU/L   VITAMIN D, 25 HYDROXY   Result Value Ref Range    VITAMIN D, 25-HYDROXY 32.8 30.0 - 100.0 ng/mL   T4, FREE   Result Value Ref Range    T4, Free 1.13 0.82 - 1.77 ng/dL   TSH 3RD GENERATION   Result Value Ref Range    TSH 1.550 0.450 - 4.500 uIU/mL   T4 (THYROXINE)   Result Value Ref Range    T4, Total 10.6 4.5 - 12.0 ug/dL   AMB POC URINE, MICROALBUMIN, SEMIQUANT (3 RESULTS)   Result Value Ref Range    ALBUMIN, URINE POC 80 Negative mg/L    CREATININE, URINE  mg/dL    Microalbumin/creat ratio (POC) <30 <30 MG/G       Results for orders placed or performed in visit on 03/01/23   CBC WITH AUTOMATED DIFF Result Value Ref Range    WBC 8.7 3.4 - 10.8 x10E3/uL    RBC 4.27 3.77 - 5.28 x10E6/uL    HGB 12.5 11.1 - 15.9 g/dL    HCT 37.9 34.0 - 46.6 %    MCV 89 79 - 97 fL    MCH 29.3 26.6 - 33.0 pg    MCHC 33.0 31.5 - 35.7 g/dL    RDW 12.2 11.7 - 15.4 %    PLATELET 160 (H) 017 - 450 x10E3/uL    NEUTROPHILS 61 Not Estab. %    Lymphocytes 31 Not Estab. %    MONOCYTES 4 Not Estab. %    EOSINOPHILS 3 Not Estab. %    BASOPHILS 1 Not Estab. %    ABS. NEUTROPHILS 5.2 1.4 - 7.0 x10E3/uL    Abs Lymphocytes 2.7 0.7 - 3.1 x10E3/uL    ABS. MONOCYTES 0.4 0.1 - 0.9 x10E3/uL    ABS. EOSINOPHILS 0.3 0.0 - 0.4 x10E3/uL    ABS. BASOPHILS 0.1 0.0 - 0.2 x10E3/uL    IMMATURE GRANULOCYTES 0 Not Estab. %    ABS. IMM.  GRANS. 0.0 0.0 - 0.1 x10E3/uL    Narrative    Performed at:  [address]  [id number]  : Jhon Dai MD, Phone:  [phone]   HEMOGLOBIN A1C WITH EAG   Result Value Ref Range    Hemoglobin A1c 6.5 (H) 4.8 - 5.6 %    Estimated average glucose 140 mg/dL    Narrative    Performed at:  [address]  [id number]  : Jhon Dai MD, Phone:  [phone]   LIPID PANEL   Result Value Ref Range    Cholesterol, total 158 100 - 199 mg/dL    Triglyceride 989 (HH) 0 - 149 mg/dL    HDL Cholesterol 14 (L) >39 mg/dL    VLDL, calculated Comment (A) 5 - 40 mg/dL    LDL, calculated Comment (A) 0 - 99 mg/dL    Narrative    Performed at:  [address]  [id number]  : Jhon Dai MD, Phone:  [phone]   METABOLIC PANEL, COMPREHENSIVE   Result Value Ref Range    Glucose 140 (H) 70 - 99 mg/dL    BUN 11 6 - 20 mg/dL    Creatinine 0.71 0.57 - 1.00 mg/dL    eGFR 117 >59 mL/min/1.73    BUN/Creatinine ratio 15 9 - 23    Sodium 139 134 - 144 mmol/L    Potassium 3.9 3.5 - 5.2 mmol/L    Chloride 102 96 - 106 mmol/L    CO2 20 20 - 29 mmol/L    Calcium 9.8 8.7 - 10.2 mg/dL    Protein, total 7.5 6.0 - 8.5 g/dL    Albumin 4.4 3.9 - 5.0 g/dL    GLOBULIN, TOTAL 3.1 1.5 - 4.5 g/dL    A-G Ratio 1.4 1.2 - 2.2    Bilirubin, total 0.3 0.0 - 1.2 mg/dL    Alk. phosphatase 105 44 - 121 IU/L    AST (SGOT) 502 (HH) 0 - 40 IU/L    ALT (SGPT) 293 (H) 0 - 32 IU/L    Narrative    Performed at:  [address] [place] [address]  [id number]  : Everton Overton MD, Phone:  [phone]   VITAMIN D, 25 HYDROXY   Result Value Ref Range    VITAMIN D, 25-HYDROXY 32.8 30.0 - 100.0 ng/mL    Narrative    Performed at:  [address] [place] [address]  [id number]  : Everton Overton MD, Phone:  [phone]   T4, FREE   Result Value Ref Range    T4, Free 1.13 0.82 - 1.77 ng/dL    Narrative    Performed at:  [address] [place] [address]  [id number]  : Everton Overton MD, Phone:  [phone]   TSH 3RD GENERATION   Result Value Ref Range    TSH 1.550 0.450 - 4.500 uIU/mL    Narrative    Performed at:  [address] [place] [address]  [id number]  : Everton Overton MD, Phone:  [phone]   T4 (THYROXINE)   Result Value Ref Range    T4, Total 10.6 4.5 - 12.0 ug/dL    Narrative    Performed at:  [address] [place] [address]  [id number]  : Everton Overton MD, Phone:  [phone]   AMB POC URINE, MICROALBUMIN, SEMIQUANT (3 RESULTS)   Result Value Ref Range    ALBUMIN, URINE POC 80 Negative mg/L    CREATININE, URINE  mg/dL    Microalbumin/creat ratio (POC) <30 <30 MG/G       Assessment/ Plan:     1. Elevated liver enzymes    - AMB POC URINE, MICROALBUMIN, SEMIQUANT (3 RESULTS)  - CBC WITH AUTOMATED DIFF; Future  - HEMOGLOBIN A1C WITH EAG; Future  - LIPID PANEL; Future  - METABOLIC PANEL, COMPREHENSIVE; Future  - VITAMIN D, 25 HYDROXY; Future  - T4, FREE; Future  - TSH 3RD GENERATION; Future  - T4 (THYROXINE);  Future  - HM DIABETES FOOT EXAM  - CBC WITH AUTOMATED DIFF  - HEMOGLOBIN A1C WITH EAG  - LIPID PANEL  - METABOLIC PANEL, COMPREHENSIVE  - VITAMIN D, 25 HYDROXY  - T4, FREE  - TSH 3RD GENERATION  - T4 (THYROXINE)  - REFERRAL TO LIVER HEPATOLOGY    2. Controlled type 2 diabetes mellitus without complication, without long-term current use of insulin (HCC)  No change to plan of care   - AMB POC URINE, MICROALBUMIN, SEMIQUANT (3 RESULTS)  - CBC WITH AUTOMATED DIFF; Future  - HEMOGLOBIN A1C WITH EAG; Future  - LIPID PANEL; Future  - METABOLIC PANEL, COMPREHENSIVE; Future  - VITAMIN D, 25 HYDROXY; Future  - T4, FREE; Future  - TSH 3RD GENERATION; Future  - T4 (THYROXINE); Future  -  DIABETES FOOT EXAM  - CBC WITH AUTOMATED DIFF  - HEMOGLOBIN A1C WITH EAG  - LIPID PANEL  - METABOLIC PANEL, COMPREHENSIVE  - VITAMIN D, 25 HYDROXY  - T4, FREE  - TSH 3RD GENERATION  - T4 (THYROXINE)    3. Elevated hemoglobin A1c  No change to plan of care   She has an appointment to establish with Dr. Sourav Castellano endocrinologist   - AMB POC URINE, MICROALBUMIN, SEMIQUANT (3 RESULTS)  - CBC WITH AUTOMATED DIFF; Future  - HEMOGLOBIN A1C WITH EAG; Future  - LIPID PANEL; Future  - METABOLIC PANEL, COMPREHENSIVE; Future  - VITAMIN D, 25 HYDROXY; Future  - T4, FREE; Future  - TSH 3RD GENERATION; Future  - T4 (THYROXINE); Future  -  DIABETES FOOT EXAM  - CBC WITH AUTOMATED DIFF  - HEMOGLOBIN A1C WITH EAG  - LIPID PANEL  - METABOLIC PANEL, COMPREHENSIVE  - VITAMIN D, 25 HYDROXY  - T4, FREE  - TSH 3RD GENERATION  - T4 (THYROXINE)    4. Celiac disease  No change to medical management   Referral to GI    5. Comprehensive diabetic foot examination, type 2 DM, encounter for ([facility]    -  DIABETES FOOT EXAM    6.  Hypertriglyceridemia  Increase atorvastatin to 40 mg po daily.  - REFERRAL TO LIVER HEPATOLOGY      Orders Placed This Encounter    CBC WITH AUTOMATED DIFF     Standing Status:   Future     Number of Occurrences:   1     Standing Expiration Date:   4/1/2023    HEMOGLOBIN A1C WITH EAG     Standing Status: Future     Number of Occurrences:   1     Standing Expiration Date:   4/1/2023    LIPID PANEL     Standing Status:   Future     Number of Occurrences:   1     Standing Expiration Date:   2/4/1962    METABOLIC PANEL, COMPREHENSIVE     Standing Status:   Future     Number of Occurrences:   1     Standing Expiration Date:   4/1/2023    VITAMIN D, 25 HYDROXY     Standing Status:   Future     Number of Occurrences:   1     Standing Expiration Date:   3/1/2024    T4, FREE     Standing Status:   Future     Number of Occurrences:   1     Standing Expiration Date:   4/1/2023    TSH 3RD GENERATION     Standing Status:   Future     Number of Occurrences:   1     Standing Expiration Date:   4/1/2023    T4 (THYROXINE)     Standing Status:   Future     Number of Occurrences:   1     Standing Expiration Date:   3/1/2024    [facility]     Referral Priority:   Routine     Referral Type:   Consultation     Referral Reason:   Specialty Services Required     Referred to Provider:   Kenya Watts MD     Number of Visits Requested:   1    AMB POC URINE, MICROALBUMIN, SEMIQUANT (3 RESULTS)    HM DIABETES FOOT EXAM         Verbal and written instructions (see AVS) provided. Patient expresses understanding of diagnosis and treatment plan.     Health Maintenance Due   Topic Date Due    COVID-19 Vaccine (1) Never done    Pneumococcal 0-64 years (1 - PCV) Never done    Hepatitis B Vaccine (1 of 3 - Risk 3-dose series) Never done               JAZMIN Diop

## 2023-03-03 NOTE — PROGRESS NOTES
I SW Ms. Tony Tinsley regarding her elevated cholesterol  and elevated liver enzymes. Referral placed to the liver [facility] with Dr. Galo Shepard. Atorvastatin doubled from 20 to 40 mg daily. Thyroid level within normal limits   HGBA1C stable. CBC platelets are elevated.   Vit D within normal limits

## 2023-03-07 DIAGNOSIS — J30.1 SEASONAL ALLERGIC RHINITIS DUE TO POLLEN: ICD-10-CM

## 2023-03-07 RX ORDER — LEVOCETIRIZINE DIHYDROCHLORIDE 5 MG/1
TABLET, FILM COATED ORAL
Qty: 30 TABLET | Refills: 0 | Status: SHIPPED | OUTPATIENT
Start: 2023-03-07

## 2023-03-09 ENCOUNTER — OFFICE VISIT (OUTPATIENT)
Dept: ENDOCRINOLOGY | Age: 33
End: 2023-03-09

## 2023-03-09 ENCOUNTER — PATIENT MESSAGE (OUTPATIENT)
Dept: HEMATOLOGY | Age: 33
End: 2023-03-09

## 2023-03-09 VITALS
HEIGHT: 65 IN | HEART RATE: 106 BPM | SYSTOLIC BLOOD PRESSURE: 122 MMHG | DIASTOLIC BLOOD PRESSURE: 86 MMHG | BODY MASS INDEX: 39.35 KG/M2 | WEIGHT: 236.2 LBS

## 2023-03-09 DIAGNOSIS — E66.01 CLASS 2 SEVERE OBESITY DUE TO EXCESS CALORIES WITH SERIOUS COMORBIDITY AND BODY MASS INDEX (BMI) OF 39.0 TO 39.9 IN ADULT (HCC): ICD-10-CM

## 2023-03-09 DIAGNOSIS — E11.9 TYPE 2 DIABETES MELLITUS WITHOUT COMPLICATION, WITHOUT LONG-TERM CURRENT USE OF INSULIN (HCC): Primary | ICD-10-CM

## 2023-03-09 DIAGNOSIS — E78.5 HYPERLIPIDEMIA, UNSPECIFIED HYPERLIPIDEMIA TYPE: ICD-10-CM

## 2023-03-09 RX ORDER — DEXAMETHASONE 1 MG/1
TABLET ORAL
Qty: 1 TABLET | Refills: 0 | Status: SHIPPED | OUTPATIENT
Start: 2023-03-09

## 2023-03-09 NOTE — PATIENT INSTRUCTIONS
Dexamethasone Suppression Test  Find a day that would work well for you to report to the nearest hospital lab. The day before this, you will need to be sure you picked up the dexamethasone tablet (1mg). -This tablet was already ordered to your pharmacy.   -Do not take the tablet if you are not sure if you will make it to the lab the next AM.    -Do not have the blood work collected if you did not take the tablet the night before. The night before appearing at the lab, you will need to take the dexamethasone tablet at 11 PM.   The following morning, you need to be at the lab first thing in the morning to have your blood-work collected around 8 AM.   All of the blood work ordered can be collected at that time. I will be in touch with you regarding the results and will discuss any necessary next steps. Recommend regular aerobic exercise  (at least 150 minutes/week of moderate-intensity activity, 75 minutes/week of vigorous intensity activity, or an equivalent combination of both) and weight loss 5 to 10 % of body weight. Dietary targets include <6% calories of added sugar, ?30 to 35% calories of total dietary fat, and ?1 drinks per day for females. Please complete the fasting lipid panel at your earliest convenience and if the triglycerides are still above 500 would recommend to start taking fenofibrate 145mg once daily. Please repeat the fasting lipid panel and liver function test 1 week before your upcoming appointment.

## 2023-03-09 NOTE — LETTER
3/9/2023    Patient: Isabel Naranjo   YOB: 1990   Date of Visit: 3/9/2023     Marvel Essex, NP  [place] [address]  [address] [address]  [address] [facility] [address]    Dear Marvel Essex, NP,      Thank you for referring Ms. Gwen Arredondo to [facility] for evaluation. My notes for this consultation are attached. If you have questions, please do not hesitate to call me. I look forward to following your patient along with you.       Sincerely,    Stephany James MD

## 2023-03-09 NOTE — PROGRESS NOTES
[facility]  DR CARLINE HYATT     REFERRED BY: Evelin Olvera NP     REASON:  Uncontrolled type 2 diabetes mellitus    CHIEF COMPLAINT: evaluation of type 2 diabetes mellitus    HISTORY OF PRESENT ILLNESS:   Kia Hussein is a 29 y.o. female with a PMHx as noted below who presents for evaluation of uncontrolled type 2 diabetes. Referred to us today for new onset diabetes type 2, fatty liver disease and hypertriglyceridemia. She indicates she feels overwhelmed with getting multiple diagnoses at 1 time but she is willing to work on improving her overall health. She is a mother of a 10year-old boy    PMH:  Celiac disease  Diabetes type 2  Hyperlipidemia    Diabetes History:  Diabetes was diagnosed: 10/2022   Family History of diabetes is Negative   Hb A1c : 6.5% 03/01/2023    Diabetes-related Hospitalizations:denies    Current Home Regimen:  MTF 500mg daily    Review of home glucose:  Not checking sugars    Hypoglycemia:no    Diet: Buys gluten-free foods  -3 meals per day, snacks 1-2 times per week  Breakfast sausage, eggs, cheese, biscuits, green smoothie  Lunch: Frozen foods tuna pack with a salad, mac & cheese  Dinner: Chicken, beef, pork, shrimp, fish, broccoli, beans, [place] sprouts, asparagus, rice, pasta, potatoes  Snacks: Very occasionally would eat potato chips, popcorn, candy, ice cream, cake  Usually eats foods, cucumbers, pickles, lunch meat  Beverages: Juice, unsweet tea, soda  Alcoholic drinks:  Once per month      Physical Activity:  -Broke her ankle in November 2022 and has had limited physical activity since      Complications:    MI or CVA:No  PVD: No  Retinopathy:No     Last Ophthalm:not yet   Nephropathy:No  Neuropathy:No      Last Podiatry:not following   Gastropathy: No  Amputations: No      On a Statin:Yes atorvastatin 40mg daily  On an ACEI/ARB:No  On Aspirin:No  Smoker:No    Diabetes Education: not yet        Review of most recent diabetes-related labs:  Lab Results   Component Value Date    HBA1C 6.5 (H) 03/01/2023    HBA1C 6.5 (H) 01/11/2023    HBA1C 6.6 (H) 10/26/2022    CHOL 158 03/01/2023    LDLC Comment (A) 03/01/2023    GFRAA 138 06/24/2021    GFRNA 120 06/24/2021    TSH 1.550 03/01/2023    VITD3 32.8 03/01/2023     Lab Key:  [id number] = IA-2 pancreatic islet cell autoantibody  CPEPL = C-peptide level  :EXT = External Lab  GADLT = MOHAMUD-65 autoantibody   INSUL = Insulin level  MCACR (or MALBEXT) = Urine Microalbumin (or External UM)  B12LT = B12 level    PAST MEDICAL/SURGICAL HISTORY:   Past Medical History:   Diagnosis Date    ADD (attention deficit disorder)     Bronchial spasms     Celiac disease     Celiac disease 05/2019    Depression 5/9/2019    Fracture, ankle 06/14/2020    right    GERD (gastroesophageal reflux disease)     Herpes simplex virus (HSV) infection     Hx of herpes simplex infection 05/13/2009    lips    Panic      Past Surgical History:   Procedure Laterality Date    HX OTHER SURGICAL  05/2019    upper endoscopy with biopsy    HX OTHER SURGICAL  07/09/2021    surgery on right and left foot on toes. ALLERGIES:   Allergies   Allergen Reactions    Food [[place]] Hives     Hives and tongue swelling to almonds    Pcn [Penicillins] Other (comments)     Family hx of allergies to PCN. MEDICATIONS ON ADMISSION:     Current Outpatient Medications:     levocetirizine (XYZAL) 5 mg tablet, Take 1 tablet by mouth once daily, Disp: 30 Tablet, Rfl: 0    atorvastatin (LIPITOR) 40 mg tablet, Take 1 Tablet by mouth daily. Indications: high amount of triglyceride in the blood, Disp: 90 Tablet, Rfl: 1    zolpidem (AMBIEN) 10 mg tablet, TAKE 1/2 TO 1 (ONE-HALF TO ONE) TABLET BY MOUTH NIGHTLY AS NEEDED FOR SLEEP, Disp: 90 Tablet, Rfl: 0    metFORMIN (GLUCOPHAGE) 500 mg tablet, Take 1 Tablet by mouth daily (with breakfast). , Disp: 90 Tablet, Rfl: 1    ARIPiprazole (ABILIFY) 2 mg tablet, Take 1 Tablet by mouth daily. , Disp: 90 Tablet, Rfl: 2    fish oil-omega-3-DHA-EPA (Fish OiL) 300-1,000 mg capsule, Take 1 Capsule by mouth daily. Indications: high amount of triglyceride in the blood, Disp: 145 Capsule, Rfl: 3    omeprazole (PRILOSEC) 20 mg capsule, Take 1 capsule by mouth once daily, Disp: 90 Capsule, Rfl: 0    Cymbalta 30 mg capsule, Take 1 Capsule by mouth daily. , Disp: 90 Capsule, Rfl: 2    norethindrone-e estradiol-iron (Junel Fe 24) 1 mg-20 mcg (24)/75 mg (4) tab, Junel Fe 24 1 mg-20 mcg (24)/75 mg (4) tablet  Take 1 tablet by mouth once daily, Disp: , Rfl:     ergocalciferol (ERGOCALCIFEROL) 1,250 mcg (50,000 unit) capsule, Take 1 Capsule by mouth every seven (7) days for 60 days. (Patient not taking: Reported on 3/9/2023), Disp: 4 Capsule, Rfl: 1    ibuprofen (MOTRIN) 800 mg tablet, , Disp: , Rfl:     albuterol (PROVENTIL HFA, VENTOLIN HFA, PROAIR HFA) 90 mcg/actuation inhaler, Take 2 Puffs by inhalation every four (4) hours as needed for Wheezing.  Indications: asthma attack, Disp: 1 Inhaler, Rfl: 1    SOCIAL HISTORY:   Social History     Socioeconomic History    Marital status: SINGLE     Spouse name: Not on file    Number of children: Not on file    Years of education: Not on file    Highest education level: Not on file   Occupational History    Not on file   Tobacco Use    Smoking status: Former    Smokeless tobacco: Never   Vaping Use    Vaping Use: Never used   Substance and Sexual Activity    Alcohol use: No    Drug use: No    Sexual activity: Yes     Partners: Male   Other Topics Concern    Not on file   Social History Narrative    Not on file     Social Determinants of Health     Financial Resource Strain: Low Risk     Difficulty of Paying Living Expenses: Not hard at all   Food Insecurity: No Food Insecurity    Worried About Running Out of Food in the Last Year: Never true    [address] in the Last Year: Never true   Transportation Needs: No Transportation Needs    Lack of Transportation (Medical): No    Lack of Transportation (Non-Medical): No   Physical Activity: Inactive    Days of Exercise per Week: 0 days    Minutes of Exercise per Session: 0 min   Stress: Stress Concern Present    Feeling of Stress : Rather much   Social Connections: Socially Isolated    Frequency of Communication with Friends and Family: More than three times a week    Frequency of Social Gatherings with Friends and Family: Once a week    Attends Mosque Services: Never    Active Member of Clubs or Organizations: No    Attends Club or Organization Meetings: Never    Marital Status: Never    Intimate Partner Violence: Not At Risk    Fear of Current or Ex-Partner: No    Emotionally Abused: No    Physically Abused: No    Sexually Abused: No   Housing Stability: Low Risk     Unable to Pay for Housing in the Last Year: No    Number of [place] in the Last Year: 1    Unstable Housing in the Last Year: No       FAMILY HISTORY:  Family History   Problem Relation Age of Onset    Asthma Sister     Migraines Maternal Grandmother     Heart Disease Paternal Grandmother     Lung Disease Paternal Grandmother     Hypertension Paternal Grandmother     Hypertension Paternal Grandfather     Depression Mother         postpartum    Anxiety Mother     Alcohol abuse Father     Drug Abuse Maternal Uncle     Cancer Maternal Grandfather         lungs       REVIEW OF SYSTEMS: Complete ROS assessed and noted for that which is described above, all else are negative.     CONSTITUTIONAL: no fevers, chills, weight loss  EYES: no blurry vision or double vision  CARDIOVASCULAR: no chest pain or palpitations  RESPIRATORY: no cough or shortness of breath  GASTROINTESTINAL: no dysphagia or abdominal pain  MUSCULOSKELETAL: no joint pains or weakness  SKIN: no rashes  NEUROLOGICAL: no numbness, tingling, or headaches  PSYCHIATRIC: no depression or anxiety  ENDOCRINE: no heat or cold intolerance, no polyuria or polydipsia      PHYSICAL EXAMINATION:  VITAL SIGNS:  Visit Vitals  /86 Pulse (!) 106   Ht 5' 5\" (1.651 m)   Wt 236 lb 3.2 oz (107.1 kg)   BMI 39.31 kg/m²     Last 3 Recorded Weights in this Encounter    03/09/23 1100   Weight: 236 lb 3.2 oz (107.1 kg)        GENERAL: NCAT, Sitting comfortably, NAD  EYES: EOMI, non-icteric, no proptosis  Ear/Nose/Throat: NCAT, no inflammation, no masses  LYMPH NODES: No LAD  CARDIOVASCULAR: S1 S2, RRR, No murmur, 2+ radial pulses  RESPIRATORY: CTA b/l, no wheeze/rales  GASTROINTESTINAL: NT, ND  MUSCULOSKELETAL: Normal ROM, no atrophy  SKIN: warm, no edema/rash/ or other skin changes  NEUROLOGIC: 5/5 power all extremities, no tremor, AAOx3  PSYCHIATRIC: Normal affect, Normal insight and judgement    Diabetic foot exam examined by podiatrist 1 week ago:     Left Foot:   Visual Exam: normal    Pulse DP: 2+ (normal)   Filament test: normal sensation    Vibratory sensation:  normal       Right Foot: Ankle in supportive wrap   Visual Exam: normal    Pulse DP: 2+ (normal)   Filament test: normal sensation    Vibratory sensation: normal      REVIEW OF LABORATORY AND RADIOLOGY DATA:   Labs and documentation have been reviewed as described above. ASSESSMENT AND PLAN:   Char Alaniz is a 29 y.o. female with a PMHx as noted above who presents for evaluation of uncontrolled type 2 diabetes. Problems:  Type 2 diabetes Uncontrolled  Hyperlipidemia  Hypertension    We had the pleasure of reviewing together the basics of diabetes including basic pathophysiology and diabetes care. We further discussed the importance of checking home glucose regularly and takin all of their scheduled medications in order to have the best possible outcome. I was able to answer any questions they had in clinic today and they are invited to reach me if they have any further questions. Based upon our discussion together today we have decided to make the following changes:     We spent time today discussing preferred dietary changes and goals which will benefit their diabetes treatment. We noted the need to have an awareness of the amount of carbohydrates consumed in each meal, which includes the beverage, main course, and desert. We noted the benefits of eating 3 meals per day with appropriate portions. We also discussed the importance of getting blood sugars back down in a timely fashion following meals to reduce what is known as post-prandial hyperglycemia. Patient demonstrated their understanding of these concepts. Patient appears to be having underlying metabolic syndrome although her liver function tests is very high and concerning for an underlying cause for her transaminitis in addition to fatty liver disease. We will start by having her repeat a fasting lipid panel as she has not done it fasting ever before. Based on the results we will decide on the next steps in management    Diet and exercise recommendations for NAFLD:    Recommend regular aerobic exercise  (at least 150 minutes/week of moderate-intensity activity, 75 minutes/week of vigorous intensity activity, or an equivalent combination of both) and weight loss 5 to 10 % of body weight. Dietary targets include <6% calories of added sugar, =30 to 35% calories of total dietary fat, and =1 drinks per day for females. PLAN  Type 2 Diabetes  A1c goal: Less than 7%    Medications:   Continue metformin 500 mg daily    Decision on other medications we will decide on the results of the lipid panel    Given her sudden weight gain we will evaluate for underlying Cushing's syndrome    Advised to check glucose 2-3 times per week  Provided with glucose log sheets for later review. HTN: BP stable on current medications, no changes today. HLD: Fasting lipids to be obtained.   If her triglycerides remain above 500 fasting will consider to switch to fenofibrate and stopping the Lipitor for now, to minimize risk of pancreatitis and would consider to start a GLP-1 agonist after the triglycerides have been improved to below 500  Obesity BMI 39: We discussed lifestyle modifications and given brochure to [facility] weight loss program, will continue to monitor  Lab Results   Component Value Date/Time    Cholesterol, total 158 03/01/2023 12:00 AM    HDL Cholesterol 14 (L) 03/01/2023 12:00 AM    LDL, calculated Comment (A) 03/01/2023 12:00 AM    VLDL, calculated Comment (A) 03/01/2023 12:00 AM    Triglyceride 989 (HH) 03/01/2023 12:00 AM        We discussed the expected course, resolution and complications of the diagnosis(es) in detail. Medication risks, benefits, costs, interactions, and alternatives were discussed as indicated. I advised Rosemary Mathias to contact the office if her condition worsens, changes or fails to improve as anticipated. Patient expressed understanding with the diagnosis(es) and plan. Please note that this dictation was completed with [employer], the computer voice recognition software. Quite often unanticipated grammatical, syntax, homophones, and other interpretive errors are inadvertently transcribed by the computer software. Efforts were made to correct these errors in proofreading. Please excuse any errors that have escaped final proofreading. Thank you. RTC 8 weeks with preMD [facility]    Please see patient instructions.

## 2023-03-15 ENCOUNTER — TELEPHONE (OUTPATIENT)
Dept: SLEEP MEDICINE | Age: 33
End: 2023-03-15

## 2023-03-15 ENCOUNTER — TELEPHONE (OUTPATIENT)
Dept: ENDOCRINOLOGY | Age: 33
End: 2023-03-15

## 2023-03-15 NOTE — TELEPHONE ENCOUNTER
Attempted to contact ms Jagrutiethel Fidel but no response, left VM that will call again tomorrow or Friday

## 2023-03-15 NOTE — TELEPHONE ENCOUNTER
Spoke with Davonte Mart @ [facility] and she stated that the the patient pressures was Changed from 6 to 7 on the 13th of Monday.

## 2023-03-21 ENCOUNTER — TELEPHONE (OUTPATIENT)
Dept: ENDOCRINOLOGY | Age: 33
End: 2023-03-21

## 2023-03-21 ENCOUNTER — PATIENT MESSAGE (OUTPATIENT)
Dept: ENDOCRINOLOGY | Age: 33
End: 2023-03-21

## 2023-03-21 DIAGNOSIS — E24.9 HYPERCORTISOLISM (HCC): Primary | ICD-10-CM

## 2023-03-21 RX ORDER — FENOFIBRATE 48 MG/1
48 TABLET, COATED ORAL DAILY
Qty: 90 TABLET | Refills: 0 | Status: SHIPPED | OUTPATIENT
Start: 2023-03-21

## 2023-03-21 NOTE — TELEPHONE ENCOUNTER
Patient left a voice message that she was returning Dr. Nadege Dover call. She can also be reached through [address] [place] [address]. She will be at work until 8 pm tonight but she will try and answer the phone if Dr. Nya Rock calls.

## 2023-03-21 NOTE — TELEPHONE ENCOUNTER
Component      Latest Ref Rng & Units 3/10/2023   Glucose      70 - 99 mg/dL 129 (H)   BUN      6 - 20 mg/dL 10   Creatinine      0.57 - 1.00 mg/dL 0.74   eGFR      >59 mL/min/1.73 112   BUN/Creatinine ratio      9 - 23 14   Sodium      134 - 144 mmol/L 137   Potassium      3.5 - 5.2 mmol/L 4.7   Chloride      96 - 106 mmol/L 100   CO2      20 - 29 mmol/L 19 (L)   Calcium      8.7 - 10.2 mg/dL 10.6 (H)   Protein, total      6.0 - 8.5 g/dL 8.1   Albumin      3.9 - 5.0 g/dL 5.1 (H)   GLOBULIN, TOTAL      1.5 - 4.5 g/dL 3.0   A-G Ratio      1.2 - 2.2 1.7   Bilirubin, total      0.0 - 1.2 mg/dL 0.4   Alk. phosphatase      44 - 121 IU/L 105   AST      0 - 40 IU/L 183 (H)   ALT      0 - 32 IU/L 192 (H)   Cholesterol, total      100 - 199 mg/dL 144   Triglyceride      0 - 149 mg/dL 398 (H)   HDL Cholesterol      >39 mg/dL 21 (L)   VLDL, calculated      5 - 40 mg/dL 62 (H)   LDL, calculated      0 - 99 mg/dL 61   Cortisol, a.m.      6.2 - 19.4 ug/dL 2.0 (L)     Spoke with ms Ray about her lab results, has elevated 1mg DST, will order 24 hr urinary Cortisol level and told about instructions to collect it, high cortisol can cause elevated triglycerides. Discussed her TG much better than previous levels because done fasting, her LFT gradually improving, given high risk of fatty liver recommend to start on fenofibrate 48 mg daily, to cont atorvastatin 40mg at bedtime, discussed possible SE including myositis/myopathy, transaminitis, and she will let us know if she develops any of them. Hypercalcemia calcul to albumin is 9.7.  Repeat labs in 4 weeks and will call us when she has gotten them completed, she indicates understanding and agrees with plan

## 2023-03-28 DIAGNOSIS — F51.04 PSYCHOPHYSIOLOGICAL INSOMNIA: ICD-10-CM

## 2023-03-28 DIAGNOSIS — F41.9 ANXIETY: ICD-10-CM

## 2023-03-29 RX ORDER — ZOLPIDEM TARTRATE 10 MG/1
10 TABLET ORAL
Qty: 90 TABLET | Refills: 0 | Status: SHIPPED | OUTPATIENT
Start: 2023-03-29

## 2023-03-29 NOTE — TELEPHONE ENCOUNTER
Message routed to provider to approve refill.     Requested Prescriptions     Pending Prescriptions Disp Refills    zolpidem (AMBIEN) 10 mg tablet 90 Tablet 0         Last Office Visit With Provider Visit date not found

## 2023-04-04 ENCOUNTER — TELEPHONE (OUTPATIENT)
Dept: FAMILY MEDICINE CLINIC | Age: 33
End: 2023-04-04

## 2023-04-05 NOTE — TELEPHONE ENCOUNTER
SW Ms Keaton Adam, she was informed of her urine lab orders / instructions. The 24 hr urine container and printed instructions were left up front for . Pt informed of, stated OK of understanding and thanks.

## 2023-04-18 LAB
CORTIS F 24H UR-MCNC: 15 UG/L
CORTIS F 24H UR-MRATE: 20 UG/24 HR (ref 6–42)
CREAT 24H UR-MRATE: 1440 MG/24 HR (ref 800–1800)
CREAT UR-MCNC: 110.8 MG/DL

## 2023-04-19 RX ORDER — FENOFIBRATE 48 MG/1
48 TABLET, COATED ORAL DAILY
Qty: 90 TABLET | Refills: 0 | Status: SHIPPED | OUTPATIENT
Start: 2023-04-19

## 2023-04-22 DIAGNOSIS — E11.9 TYPE 2 DIABETES MELLITUS WITHOUT COMPLICATION, WITHOUT LONG-TERM CURRENT USE OF INSULIN (HCC): Primary | ICD-10-CM

## 2023-04-24 DIAGNOSIS — E11.9 TYPE 2 DIABETES MELLITUS WITHOUT COMPLICATION, WITHOUT LONG-TERM CURRENT USE OF INSULIN (HCC): Primary | ICD-10-CM

## 2023-04-26 ENCOUNTER — OFFICE VISIT (OUTPATIENT)
Dept: FAMILY MEDICINE CLINIC | Age: 33
End: 2023-04-26
Payer: MEDICAID

## 2023-04-26 VITALS
OXYGEN SATURATION: 98 % | HEART RATE: 95 BPM | HEIGHT: 65 IN | BODY MASS INDEX: 39.82 KG/M2 | DIASTOLIC BLOOD PRESSURE: 90 MMHG | TEMPERATURE: 97.2 F | RESPIRATION RATE: 18 BRPM | WEIGHT: 239 LBS | SYSTOLIC BLOOD PRESSURE: 140 MMHG

## 2023-04-26 DIAGNOSIS — G47.00 INSOMNIA DISORDER WITH NON-SLEEP DISORDER MENTAL COMORBIDITY: Primary | ICD-10-CM

## 2023-04-26 DIAGNOSIS — J30.1 SEASONAL ALLERGIC RHINITIS DUE TO POLLEN: ICD-10-CM

## 2023-04-26 PROCEDURE — 99214 OFFICE O/P EST MOD 30 MIN: CPT | Performed by: NURSE PRACTITIONER

## 2023-04-26 RX ORDER — LEVOCETIRIZINE DIHYDROCHLORIDE 5 MG/1
5 TABLET, FILM COATED ORAL DAILY
Qty: 90 TABLET | Refills: 1 | Status: SHIPPED | OUTPATIENT
Start: 2023-04-26

## 2023-04-26 NOTE — PROGRESS NOTES
YES Answers must have Comments  1. \"Have you been to the ER, urgent care clinic since your last visit? Hospitalized since your last visit? \"    [] YES   [x] NO       2. Have you seen or consulted any other health care providers outside of [address] [place] [address] since your last visit?     [] YES   [x] NO       3. For patients aged 39-70: Have you had a colorectal cancer screening such as a colonoscopy/FIT/Cologuard? Nurse/CMA to request records if not in chart   [] YES   [] NO   [x] NA, based on age    If the patient is female:      4. For female patients aged 41-77: Oly Lawson you had a mammogram in the last two years?  Nurse/CMA to request records if not in chart   [] YES   [] NO   [x] NA, based on age    11. For female patients aged 21-65: Oly Lawson you had a pap smear?   Nurse/CMA to request records if not in chart   [x] YES 2-2021   [] NO  [] NA, based on age

## 2023-04-27 ENCOUNTER — TRANSCRIBE ORDER (OUTPATIENT)
Dept: SCHEDULING | Age: 33
End: 2023-04-27

## 2023-04-27 DIAGNOSIS — R14.0 BLOATING: ICD-10-CM

## 2023-04-27 DIAGNOSIS — R79.89 ELEVATED LFTS: Primary | ICD-10-CM

## 2023-04-27 NOTE — PROGRESS NOTES
Subjective:     Sinan Benjamin is a 29 y.o. female who presents today with the following:  Chief Complaint   Patient presents with    Diabetes    Anxiety       Patient Active Problem List   Diagnosis Code    MOHAMUD (generalized anxiety disorder) F41.1    Recurrent major depressive disorder, in partial remission ([facility] [address] F33.41    Insomnia disorder with non-sleep disorder mental comorbidity G47.00    Gastrointestinal problem R19.8    Obstructive sleep apnea G47.33         COMPLIANT WITH MEDICATION:    Denies chest pain, dyspnea, palpitations, headache and blurred vision. Blood pressure elevated today. Elevated liver enzymes; She endorses she has an appointment with GI provider tomorrow. Diabetes; followed by endocrinologist Dr. Harjit garrido on metformin    Seasonal allergies, sneezing watery eyes, She request a refill of zyzal     depression screening addressed not at risk    abuse screening addressed denies    learning assessment addressed reviewed nurses notes    fall risk addressed not at risk    HM: addressed She declines vaccines today    ROS:  Gen: denies fever, chills, fatigue, weight loss, weight gain  HEENT:denies blurry vision, nasal congestion, sore throat  Resp: denies dypsnea, cough, wheezing  CV: denies chest pain radiating to the jaws or arms, palpitations, lower extremity edema  Abd: denies nausea, vomiting, diarrhea, constipation  Neuro: denies numbness/tingling  Endo: denies polyuria, polydipsia, heat/cold intolerance  Heme: no lymphadenopathy    Allergies   Allergen Reactions    Food [[place]] Hives     Hives and tongue swelling to almonds    Pcn [Penicillins] Other (comments)     Family hx of allergies to PCN. Current Outpatient Medications:     levocetirizine (XYZAL) 5 mg tablet, Take 1 Tablet by mouth daily. Indications: inflammation of the nose due to an allergy, Disp: 90 Tablet, Rfl: 1    fenofibrate nanocrystallized (TRICOR) 48 mg tablet, Take 1 Tablet by mouth daily. , Disp: 90 Tablet, Rfl: 0    ARIPiprazole (ABILIFY) 2 mg tablet, Take 1 Tablet by mouth daily. , Disp: 90 Tablet, Rfl: 2    metFORMIN (GLUCOPHAGE) 500 mg tablet, Take 1 Tablet by mouth daily (with breakfast). , Disp: 90 Tablet, Rfl: 1    atorvastatin (LIPITOR) 40 mg tablet, Take 1 Tablet by mouth daily. Indications: high amount of triglyceride in the blood, Disp: 90 Tablet, Rfl: 1    zolpidem (AMBIEN) 10 mg tablet, Take 1 Tablet by mouth nightly as needed for Sleep. Max Daily Amount: 10 mg., Disp: 90 Tablet, Rfl: 0    omeprazole (PRILOSEC) 20 mg capsule, Take 1 capsule by mouth once daily, Disp: 90 Capsule, Rfl: 0    dexAMETHasone (DECADRON) 1 mg tablet, Take 1mg Dexamethasone tablet the night before at 11:00PM and come to the lab in the morning at 8 AM for Cortisol and Dexamethasone level draw, Disp: 1 Tablet, Rfl: 0    Cymbalta 30 mg capsule, Take 1 Capsule by mouth daily. , Disp: 90 Capsule, Rfl: 2    albuterol (PROVENTIL HFA, VENTOLIN HFA, PROAIR HFA) 90 mcg/actuation inhaler, Take 2 Puffs by inhalation every four (4) hours as needed for Wheezing. Indications: asthma attack, Disp: 1 Inhaler, Rfl: 1    norethindrone-e estradiol-iron (Junel Fe 24) 1 mg-20 mcg (24)/75 mg (4) tab, Junel Fe 24 1 mg-20 mcg (24)/75 mg (4) tablet  Take 1 tablet by mouth once daily, Disp: , Rfl:     Past Medical History:   Diagnosis Date    ADD (attention deficit disorder)     Bronchial spasms     Celiac disease     Celiac disease 05/2019    Depression 5/9/2019    Fracture, ankle 06/14/2020    right    GERD (gastroesophageal reflux disease)     Herpes simplex virus (HSV) infection     Hx of herpes simplex infection 05/13/2009    lips    Panic        Past Surgical History:   Procedure Laterality Date    HX OTHER SURGICAL  05/2019    upper endoscopy with biopsy    HX OTHER SURGICAL  07/09/2021    surgery on right and left foot on toes.        Social History     Tobacco Use   Smoking Status Former   Smokeless Tobacco Never       Social History Socioeconomic History    Marital status: SINGLE   Tobacco Use    Smoking status: Former    Smokeless tobacco: Never   Vaping Use    Vaping Use: Never used   Substance and Sexual Activity    Alcohol use: No    Drug use: No    Sexual activity: Yes     Partners: Male     Social Determinants of Health     Financial Resource Strain: Low Risk     Difficulty of Paying Living Expenses: Not hard at all   Food Insecurity: No Food Insecurity    Worried About [address] in the Last Year: Never true    [address] in the Last Year: Never true   Transportation Needs: No Transportation Needs    Lack of Transportation (Medical): No    Lack of Transportation (Non-Medical):  No   Physical Activity: Inactive    Days of Exercise per Week: 0 days    Minutes of Exercise per Session: 0 min   Stress: Stress Concern Present    Feeling of Stress : Rather much   Social Connections: Socially Isolated    Frequency of Communication with Friends and Family: More than three times a week    Frequency of Social Gatherings with Friends and Family: Once a week    Attends [employer] Services: Never    Active Member of Clubs or Organizations: No    Attends Club or Organization Meetings: Never    Marital Status: Never    Housing Stability: Low Risk     Unable to Pay for Housing in the Last Year: No    Number of [place] in the Last Year: 1    Unstable Housing in the Last Year: No       Family History   Problem Relation Age of Onset    Asthma Sister     Migraines Maternal Grandmother     Heart Disease Paternal Grandmother     Lung Disease Paternal Grandmother     Hypertension Paternal Grandmother     Hypertension Paternal Grandfather     Depression Mother         postpartum    Anxiety Mother     Alcohol abuse Father     Drug Abuse Maternal Uncle     Cancer Maternal Grandfather         lungs         Objective:     Visit Vitals  BP (!) 140/90 (BP 1 Location: Right upper arm, BP Patient Position: Sitting, BP Cuff Size: Large adult) Pulse 95   Temp 97.2 °F (36.2 °C) (Temporal)   Resp 18   Ht 5' 5\" (1.651 m)   Wt 239 lb (108.4 kg)   SpO2 98%   BMI 39.77 kg/m²     Body mass index is 39.77 kg/m². General: Alert and oriented. No acute distress. Well nourished, obese  HEENT :  Ears:TMs are normal. Canals are clear. Eyes: pupils equal, round, react to light and accommodation. Extra ocular movements intact. Nose: patent. Mouth and throat is clear. Neck:supple full range of motion no thyromegaly. Trachea midline, No carotid bruits. No significant lymphadenopathy  Lungs[de-identified] clear to auscultation without wheezes, rales, or rhonchi. Heart :RRR, S1 & S2 are normal intensity. No murmur; no gallop  Abdomen: bowel sounds active. No tenderness, guarding, rebound, masses, hepatic or spleen enlargement  Back: no CVA tenderness. Extremities: without clubbing, cyanosis, or edema, ankle wrap intact. right  Pulses: radial and femoral pulses are normal  Neuro: HMF intact. Cranial nerves II through XII grossly normal.  Motor: is 5 over 5 and symmetrical.   Deep tendon reflexes: +2 equal  Psych:appropriate behavior, mood, affect and judgement. Results for orders placed or performed in visit on 03/21/23   CORTISOL, URINE FREE 24 HR   Result Value Ref Range    Cortisol free, ug/L 15 Undefined ug/L    Cortisol free, ug/24 hr 20 6 - 42 ug/24 hr   CREATININE, UR, 24 HR   Result Value Ref Range    Creatinine, urine random 110.8 Not Estab. mg/dL    Creatinine, urine 24 hr 1,440 800 - 1,800 mg/24 hr       No results found for this visit on 04/26/23. Assessment/ Plan:     1. Seasonal allergic rhinitis due to pollen    - levocetirizine (XYZAL) 5 mg tablet; Take 1 Tablet by mouth daily. Indications: inflammation of the nose due to an allergy  Dispense: 90 Tablet; Refill: 1    2. Insomnia disorder with non-sleep disorder mental comorbidity    - COMPLIANCE DRUG SCREEN/PRESCRIPTION MONITORING;  Future  - COMPLIANCE DRUG SCREEN/PRESCRIPTION MONITORING      Orders Placed This Encounter    COMPLIANCE DRUG SCREEN/PRESCRIPTION MONITORING     Standing Status:   Future     Number of Occurrences:   1     Standing Expiration Date:   4/26/2024     Order Specific Question:   Patient Rx Info A-D (Choose Trade or Generic, not both)     Answer:   Ambien    levocetirizine (XYZAL) 5 mg tablet     Sig: Take 1 Tablet by mouth daily. Indications: inflammation of the nose due to an allergy     Dispense:  90 Tablet     Refill:  1         Verbal and written instructions (see AVS) provided. Patient expresses understanding of diagnosis and treatment plan.     Health Maintenance Due   Topic Date Due    COVID-19 Vaccine (1) Never done    Varicella Vaccine (1 of 2 - 2-dose childhood series) Never done    Pneumococcal 0-64 years (1 - PCV) Never done    Hepatitis B Vaccine (1 of 3 - Risk 3-dose series) Never done               JAZMIN Lambert

## 2023-04-27 NOTE — ACP (ADVANCE CARE PLANNING)
Discussed importance of advanced medical directives with patient. Patient is capable of making decisions.  Sreekanth RENDONC

## 2023-04-28 ENCOUNTER — LAB ONLY (OUTPATIENT)
Dept: FAMILY MEDICINE CLINIC | Age: 33
End: 2023-04-28
Payer: MEDICAID

## 2023-04-28 DIAGNOSIS — E03.8 OTHER SPECIFIED HYPOTHYROIDISM: Primary | ICD-10-CM

## 2023-04-28 PROCEDURE — 36415 COLL VENOUS BLD VENIPUNCTURE: CPT | Performed by: NURSE PRACTITIONER

## 2023-05-01 DIAGNOSIS — F41.9 ANXIETY: ICD-10-CM

## 2023-05-01 DIAGNOSIS — F51.04 PSYCHOPHYSIOLOGICAL INSOMNIA: ICD-10-CM

## 2023-05-01 RX ORDER — ZOLPIDEM TARTRATE 10 MG/1
10 TABLET ORAL
Qty: 30 TABLET | Refills: 0 | Status: SHIPPED | OUTPATIENT
Start: 2023-05-01

## 2023-05-01 NOTE — TELEPHONE ENCOUNTER
From: Haja Stinson  To:  Office of Vivek Perez NP  Sent: 4/28/2023 7:29 PM EDT  Subject: Medication Renewal Request    Refills have been requested for the following medications:     zolpidem (AMBIEN) 10 mg tablet [Kristen Martinez]    Preferred pharmacy: [facility], Luisa Ayala [facility]

## 2023-05-05 LAB — DRUGS UR: NORMAL

## 2023-05-08 ENCOUNTER — TELEMEDICINE (OUTPATIENT)
Age: 33
End: 2023-05-08
Payer: MEDICAID

## 2023-05-08 ENCOUNTER — TELEPHONE (OUTPATIENT)
Age: 33
End: 2023-05-08

## 2023-05-08 DIAGNOSIS — E11.9 TYPE 2 DIABETES MELLITUS WITHOUT COMPLICATION, WITHOUT LONG-TERM CURRENT USE OF INSULIN (HCC): Primary | ICD-10-CM

## 2023-05-08 DIAGNOSIS — E78.5 HYPERLIPIDEMIA, UNSPECIFIED HYPERLIPIDEMIA TYPE: ICD-10-CM

## 2023-05-08 DIAGNOSIS — K76.0 NAFL (NONALCOHOLIC FATTY LIVER): ICD-10-CM

## 2023-05-08 DIAGNOSIS — E11.9 TYPE 2 DIABETES MELLITUS WITHOUT COMPLICATION, WITHOUT LONG-TERM CURRENT USE OF INSULIN (HCC): ICD-10-CM

## 2023-05-08 PROCEDURE — 3044F HG A1C LEVEL LT 7.0%: CPT | Performed by: GENERAL ACUTE CARE HOSPITAL

## 2023-05-08 PROCEDURE — 99214 OFFICE O/P EST MOD 30 MIN: CPT | Performed by: GENERAL ACUTE CARE HOSPITAL

## 2023-05-08 RX ORDER — FENOFIBRATE 48 MG/1
48 TABLET, COATED ORAL DAILY
Qty: 90 TABLET | Refills: 1 | Status: SHIPPED | OUTPATIENT
Start: 2023-05-08

## 2023-05-08 RX ORDER — SEMAGLUTIDE 0.68 MG/ML
0.25 INJECTION, SOLUTION SUBCUTANEOUS
Qty: 3 ML | Refills: 3 | Status: SHIPPED | OUTPATIENT
Start: 2023-05-08

## 2023-05-08 RX ORDER — ATORVASTATIN CALCIUM 40 MG/1
40 TABLET, FILM COATED ORAL DAILY
Qty: 90 TABLET | Refills: 1 | Status: SHIPPED | OUTPATIENT
Start: 2023-05-08

## 2023-05-08 NOTE — TELEPHONE ENCOUNTER
I called and had lab fax me results and I faxed to Dr. Erik Felix. I place in bin to be scanned into patients chart. Patient is aware.

## 2023-05-08 NOTE — PROGRESS NOTES
Hb A1c : 6.5% 03/01/2023      Diabetes-related Hospitalizations:denies      Current Home Regimen:   MTF 500mg daily      Review of home glucose:   Not checking sugars      Hypoglycemia:no      Diet: Buys gluten-free foods   -3 meals per day, snacks 1-2 times per week  Breakfast sausage, eggs, cheese, biscuits, green smoothie  Lunch: Frozen foods tuna pack with a salad, mac & cheese  Dinner: Chicken, beef,  pork, shrimp, fish, broccoli, beans, [place] sprouts, asparagus, rice, pasta, potatoes  Snacks: Very occasionally would eat potato chips, popcorn, candy, ice cream, cake  Usually eats foods, cucumbers, pickles, lunch meat  Beverages: Juice,  unsweet tea, soda  Alcoholic drinks:  Once per month         Physical Activity:  -Broke her ankle in November 2022 and has had limited physical activity since        Complications:   MI or CVA:No   PVD: No   Retinopathy:No     Last Ophthalm:not yet has set up an appt   Nephropathy:No   Neuropathy:No      Last Podiatry:not following    Gastropathy: No  Amputations: No         On a Statin:Yes atorvastatin 40mg daily   On an ACEI/ARB:No   On Aspirin:No   Smoker:No      Diabetes Education: not yet            Review of most recent diabetes-related labs:     Review of most recent diabetes-related labs:  Lab Results   Component Value Date    CHOL 144 03/10/2023    GFRAA 138 06/24/2021    TSH 1.550 03/01/2023     Lab Key:  [id number] = IA-2 pancreatic islet cell autoantibody  CPEPL = C-peptide level  :EXT = External Lab  GADLT = SANDY-65 autoantibody   INSUL = Insulin level  MCACR (or MALBEXT) = Urine Microalbumin (or External UM)  B12LT = B12 level    Lab Key:   [id number] = IA-2 pancreatic islet cell autoantibody   CPEPL = C-peptide level   :EXT = External Lab   GADLT = SANDY-65 autoantibody    INSUL = Insulin level   MCACR (or MALBEXT) = Urine Microalbumin (or External UM)   B12LT = B12 level      PAST MEDICAL/SURGICAL HISTORY:      Past Medical History:   Diagnosis Date    ADD (attention

## 2023-05-09 DIAGNOSIS — E11.9 TYPE 2 DIABETES MELLITUS WITHOUT COMPLICATION, WITHOUT LONG-TERM CURRENT USE OF INSULIN (HCC): ICD-10-CM

## 2023-05-09 DIAGNOSIS — R14.0 BLOATING: ICD-10-CM

## 2023-05-09 DIAGNOSIS — R79.89 ELEVATED LFTS: Primary | ICD-10-CM

## 2023-05-13 ENCOUNTER — TELEPHONE (OUTPATIENT)
Age: 33
End: 2023-05-13

## 2023-05-15 ENCOUNTER — TELEPHONE (OUTPATIENT)
Age: 33
End: 2023-05-15

## 2023-05-15 NOTE — TELEPHONE ENCOUNTER
----- Message from Brenna Robbins sent at 5/15/2023  9:14 AM EDT -----  Regarding: Phone call  Contact: [phone]  Blado Perez, I received your voicemail Saturday, I will try to be by the phone today but am at work until [address] forward to speaking with you.

## 2023-05-15 NOTE — TELEPHONE ENCOUNTER
I called Ms. Gisselle Nogueira and informed her that [facility] denied the Ozempic. The letter stated that she would need to have tried and had a poor response before being considered for Ozempic. The preferred drugs are Byetta, Trulicity or Victoza    I informed her that Dr. Varsha Bedoya wanted to know if she would like to try the Trulicity and if so she will send in a new script. Patient stated what ever Dr. Varsha Bedoya thinks is appropriate is okay with her. I informed her that the new script will be sent to the pharmacy today.

## 2023-05-16 ENCOUNTER — PATIENT MESSAGE (OUTPATIENT)
Age: 33
End: 2023-05-16

## 2023-05-16 RX ORDER — DULAGLUTIDE 0.75 MG/.5ML
0.75 INJECTION, SOLUTION SUBCUTANEOUS WEEKLY
Qty: 2 ML | Refills: 3 | Status: SHIPPED | OUTPATIENT
Start: 2023-05-16

## 2023-05-23 DIAGNOSIS — F51.01 PRIMARY INSOMNIA: Primary | ICD-10-CM

## 2023-05-23 RX ORDER — ZOLPIDEM TARTRATE 10 MG/1
TABLET ORAL
Qty: 30 TABLET | Refills: 0 | Status: SHIPPED | OUTPATIENT
Start: 2023-05-23 | End: 2023-06-30

## 2023-05-23 RX ORDER — DULOXETIN HYDROCHLORIDE 30 MG/1
30 CAPSULE, DELAYED RELEASE ORAL DAILY
Qty: 30 CAPSULE | Refills: 1 | Status: SHIPPED | OUTPATIENT
Start: 2023-05-23 | End: 2023-05-24

## 2023-05-24 DIAGNOSIS — F33.41 RECURRENT MAJOR DEPRESSIVE DISORDER, IN PARTIAL REMISSION (HCC): Primary | ICD-10-CM

## 2023-05-24 DIAGNOSIS — F41.1 GAD (GENERALIZED ANXIETY DISORDER): ICD-10-CM

## 2023-05-24 RX ORDER — DULOXETIN HYDROCHLORIDE 30 MG/1
30 CAPSULE, DELAYED RELEASE ORAL DAILY
Qty: 30 CAPSULE | Refills: 1 | Status: CANCELLED | OUTPATIENT
Start: 2023-05-24

## 2023-05-24 NOTE — TELEPHONE ENCOUNTER
Pt called stating that she picked up her prescription and it was duloxetine and she needs it to be cymbalta because she cannot take the generic form.        [address] [place] [address], [address] [place] [id number]

## 2023-05-29 DIAGNOSIS — F51.01 PRIMARY INSOMNIA: ICD-10-CM

## 2023-05-30 RX ORDER — ZOLPIDEM TARTRATE 10 MG/1
TABLET ORAL
Qty: 30 TABLET | Refills: 0 | Status: CANCELLED | OUTPATIENT
Start: 2023-05-30 | End: 2023-07-06

## 2023-05-31 ENCOUNTER — TELEPHONE (OUTPATIENT)
Age: 33
End: 2023-05-31

## 2023-05-31 NOTE — TELEPHONE ENCOUNTER
Cristobal Duckworth / Pharmacy Tech to confirm if the zolpidem Rx sent in on 05/23/2023 OK to refill before sending again to the provider? The pt is requesting it again. VM left to pt that her unnamed requested Rx refill should be ready for pick sometime today per [address] Shen [address].

## 2023-05-31 NOTE — TELEPHONE ENCOUNTER
----- Message from Brenna Botello  Carmine Charli sent at 5/30/2023  5:23 PM EDT -----  Regarding: Please refill  Contact: [phone]  I need to refill my zolpidem prescription but it has to be sent in by a provider, I requested it a week ago but they said it was too soon, so I requested it again today and your office denied it, I need to pick it up by Thursday as I will be leaving to go out of state Friday

## 2023-06-05 RX ORDER — DULAGLUTIDE 0.75 MG/.5ML
0.75 INJECTION, SOLUTION SUBCUTANEOUS WEEKLY
Qty: 2 ML | Refills: 3 | Status: SHIPPED | OUTPATIENT
Start: 2023-06-05

## 2023-06-20 DIAGNOSIS — F41.1 GAD (GENERALIZED ANXIETY DISORDER): ICD-10-CM

## 2023-06-20 DIAGNOSIS — F33.41 RECURRENT MAJOR DEPRESSIVE DISORDER, IN PARTIAL REMISSION (HCC): ICD-10-CM

## 2023-06-20 RX ORDER — OMEPRAZOLE 20 MG/1
CAPSULE, DELAYED RELEASE ORAL
Qty: 90 CAPSULE | Refills: 0 | Status: SHIPPED | OUTPATIENT
Start: 2023-06-20

## 2023-06-21 DIAGNOSIS — F33.41 RECURRENT MAJOR DEPRESSIVE DISORDER, IN PARTIAL REMISSION (HCC): ICD-10-CM

## 2023-06-21 DIAGNOSIS — F41.1 GAD (GENERALIZED ANXIETY DISORDER): ICD-10-CM

## 2023-06-21 NOTE — TELEPHONE ENCOUNTER
Patient requesting refill on  Requested Prescriptions     Pending Prescriptions Disp Refills    CYMBALTA 30 MG extended release capsule 90 capsule 0     Sig: Take 1 capsule by mouth daily          Last OV 4/26/23

## 2023-06-26 ENCOUNTER — PATIENT MESSAGE (OUTPATIENT)
Age: 33
End: 2023-06-26

## 2023-06-26 DIAGNOSIS — F51.01 PRIMARY INSOMNIA: ICD-10-CM

## 2023-06-27 RX ORDER — ZOLPIDEM TARTRATE 10 MG/1
TABLET ORAL
Qty: 30 TABLET | Refills: 0 | Status: SHIPPED | OUTPATIENT
Start: 2023-06-27 | End: 2023-08-03

## 2023-06-28 RX ORDER — GLIPIZIDE 2.5 MG/1
2.5 TABLET, EXTENDED RELEASE ORAL DAILY
Qty: 90 TABLET | Refills: 1 | Status: SHIPPED | OUTPATIENT
Start: 2023-06-28

## 2023-06-28 RX ORDER — FENOFIBRATE 145 MG/1
145 TABLET, COATED ORAL DAILY
Qty: 90 TABLET | Refills: 1 | Status: SHIPPED | OUTPATIENT
Start: 2023-06-28

## 2023-07-11 ENCOUNTER — PATIENT MESSAGE (OUTPATIENT)
Age: 33
End: 2023-07-11

## 2023-07-14 ENCOUNTER — TELEMEDICINE (OUTPATIENT)
Age: 33
End: 2023-07-14
Payer: MEDICAID

## 2023-07-14 DIAGNOSIS — E11.9 TYPE 2 DIABETES MELLITUS WITHOUT COMPLICATION, WITHOUT LONG-TERM CURRENT USE OF INSULIN (HCC): ICD-10-CM

## 2023-07-14 DIAGNOSIS — E66.9 OBESITY (BMI 30-39.9): ICD-10-CM

## 2023-07-14 DIAGNOSIS — K76.0 NAFLD (NONALCOHOLIC FATTY LIVER DISEASE): ICD-10-CM

## 2023-07-14 DIAGNOSIS — E11.9 TYPE 2 DIABETES MELLITUS WITHOUT COMPLICATION, WITHOUT LONG-TERM CURRENT USE OF INSULIN (HCC): Primary | ICD-10-CM

## 2023-07-14 DIAGNOSIS — E78.2 MIXED HYPERLIPIDEMIA: ICD-10-CM

## 2023-07-14 PROCEDURE — 99214 OFFICE O/P EST MOD 30 MIN: CPT | Performed by: GENERAL ACUTE CARE HOSPITAL

## 2023-07-14 PROCEDURE — 3052F HG A1C>EQUAL 8.0%<EQUAL 9.0%: CPT | Performed by: GENERAL ACUTE CARE HOSPITAL

## 2023-07-14 NOTE — PROGRESS NOTES
[facility] DIABETES AND ENDOCRINOLOGY   DR MARIAN Carter Gary, was evaluated through a synchronous (real-time) audio-video encounter. The patient (or guardian if applicable) is aware that this is a billable service, which includes applicable co-pays. This Virtual Visit was conducted with patient's (and/or legal guardian's) consent. Patient identification was verified, and a caregiver was present when appropriate. The patient was located at Home:  [address]  Provider was located at [facility] ([address]): [facility],  [address]    REFERRED BY: Staci Perkins NP      REASON:  Uncontrolled type 2 diabetes mellitus     CHIEF COMPLAINT: evaluation of type 2 diabetes mellitus     HISTORY OF PRESENT ILLNESS:   Raul Vega is a 34 y.o.  female with a PMHx as noted below who presents for evaluation of uncontrolled type 2 diabetes. Referred to us for new onset diabetes type 2, fatty liver disease and hypertriglyceridemia on 03/2023. She indicates she feels overwhelmed with getting multiple diagnoses at 1 time but she is willing to work on improving her overall health.       She is a mother of a 10year-old healthy boy      PMH:  Celiac disease   Diabetes type 2   Hyperlipidemia      Diabetes History:   Diabetes was diagnosed: 10/2022    Family History of diabetes is Negative    Hb A1c : 6.5% 03/01/2023  8.2% 06/15/2023      Diabetes-related Hospitalizations:denies     Current Home Regimen:  MTF 014VT daily  Trulicity 5.48JQ weekly     Review of home glucose:  Not checking sugars regularly, does not have supplies     Hypoglycemia:no     Diet: Buys gluten-free foods  3 meals per day, snacks 1-2 times per week  Breakfast sausage, eggs, cheese, biscuits, green smoothie  Lunch: Frozen foods tuna pack with a salad, mac & cheese  Dinner: Chicken, beef,  pork, shrimp, fish, broccoli, beans, [place] sprouts, asparagus, rice,

## 2023-07-17 ENCOUNTER — PATIENT MESSAGE (OUTPATIENT)
Age: 33
End: 2023-07-17

## 2023-07-17 DIAGNOSIS — E11.9 TYPE 2 DIABETES MELLITUS WITHOUT COMPLICATION, WITHOUT LONG-TERM CURRENT USE OF INSULIN (HCC): Primary | ICD-10-CM

## 2023-07-20 RX ORDER — GLIPIZIDE 2.5 MG/1
2.5 TABLET, EXTENDED RELEASE ORAL DAILY
Qty: 90 TABLET | Refills: 1 | Status: CANCELLED | OUTPATIENT
Start: 2023-07-20

## 2023-07-20 RX ORDER — FENOFIBRATE 145 MG/1
145 TABLET, COATED ORAL DAILY
Qty: 90 TABLET | Refills: 1 | Status: CANCELLED | OUTPATIENT
Start: 2023-07-20

## 2023-07-21 DIAGNOSIS — F41.1 GAD (GENERALIZED ANXIETY DISORDER): ICD-10-CM

## 2023-07-21 DIAGNOSIS — F33.41 RECURRENT MAJOR DEPRESSIVE DISORDER, IN PARTIAL REMISSION (HCC): ICD-10-CM

## 2023-07-21 RX ORDER — BLOOD SUGAR DIAGNOSTIC
STRIP MISCELLANEOUS
Qty: 100 EACH | Refills: 3 | Status: SHIPPED | OUTPATIENT
Start: 2023-07-21 | End: 2023-07-21 | Stop reason: SDUPTHER

## 2023-07-21 RX ORDER — GLUCOSAMINE HCL/CHONDROITIN SU 500-400 MG
CAPSULE ORAL
Qty: 100 STRIP | Refills: 3 | Status: SHIPPED | OUTPATIENT
Start: 2023-07-21

## 2023-07-21 RX ORDER — GLIPIZIDE 2.5 MG/1
2.5 TABLET, EXTENDED RELEASE ORAL DAILY
Qty: 90 TABLET | Refills: 1 | Status: SHIPPED | OUTPATIENT
Start: 2023-07-21

## 2023-07-21 RX ORDER — LANCETS 30 GAUGE
EACH MISCELLANEOUS
Qty: 100 EACH | Refills: 3 | Status: SHIPPED | OUTPATIENT
Start: 2023-07-21 | End: 2023-07-21

## 2023-07-21 RX ORDER — FENOFIBRATE 145 MG/1
145 TABLET, COATED ORAL DAILY
Qty: 90 TABLET | Refills: 1 | Status: SHIPPED | OUTPATIENT
Start: 2023-07-21

## 2023-07-21 RX ORDER — BLOOD-GLUCOSE METER
KIT MISCELLANEOUS
Qty: 1 KIT | Refills: 0 | Status: SHIPPED | OUTPATIENT
Start: 2023-07-21

## 2023-07-21 RX ORDER — LANCETS 30 GAUGE
EACH MISCELLANEOUS
Qty: 100 EACH | Refills: 3 | Status: SHIPPED | OUTPATIENT
Start: 2023-07-21

## 2023-07-21 RX ORDER — BLOOD-GLUCOSE METER
EACH MISCELLANEOUS
Qty: 1 KIT | Refills: 0 | Status: SHIPPED | OUTPATIENT
Start: 2023-07-21 | End: 2023-07-21

## 2023-07-24 SDOH — ECONOMIC STABILITY: FOOD INSECURITY: WITHIN THE PAST 12 MONTHS, THE FOOD YOU BOUGHT JUST DIDN'T LAST AND YOU DIDN'T HAVE MONEY TO GET MORE.: PATIENT DECLINED

## 2023-07-24 SDOH — ECONOMIC STABILITY: TRANSPORTATION INSECURITY
IN THE PAST 12 MONTHS, HAS LACK OF TRANSPORTATION KEPT YOU FROM MEETINGS, WORK, OR FROM GETTING THINGS NEEDED FOR DAILY LIVING?: PATIENT DECLINED

## 2023-07-24 SDOH — ECONOMIC STABILITY: HOUSING INSECURITY
IN THE LAST 12 MONTHS, WAS THERE A TIME WHEN YOU DID NOT HAVE A STEADY PLACE TO SLEEP OR SLEPT IN A SHELTER (INCLUDING NOW)?: PATIENT REFUSED

## 2023-07-24 SDOH — ECONOMIC STABILITY: FOOD INSECURITY: WITHIN THE PAST 12 MONTHS, YOU WORRIED THAT YOUR FOOD WOULD RUN OUT BEFORE YOU GOT MONEY TO BUY MORE.: PATIENT DECLINED

## 2023-07-24 SDOH — ECONOMIC STABILITY: INCOME INSECURITY: HOW HARD IS IT FOR YOU TO PAY FOR THE VERY BASICS LIKE FOOD, HOUSING, MEDICAL CARE, AND HEATING?: PATIENT DECLINED

## 2023-07-26 ENCOUNTER — HOSPITAL ENCOUNTER (OUTPATIENT)
Facility: HOSPITAL | Age: 33
Discharge: HOME | End: 2023-07-29

## 2023-07-26 ENCOUNTER — OFFICE VISIT (OUTPATIENT)
Age: 33
End: 2023-07-26
Payer: MEDICAID

## 2023-07-26 VITALS
HEIGHT: 65 IN | OXYGEN SATURATION: 98 % | WEIGHT: 240 LBS | BODY MASS INDEX: 39.99 KG/M2 | TEMPERATURE: 97.6 F | DIASTOLIC BLOOD PRESSURE: 92 MMHG | HEART RATE: 106 BPM | SYSTOLIC BLOOD PRESSURE: 134 MMHG

## 2023-07-26 DIAGNOSIS — R74.8 ELEVATED LIVER ENZYMES: ICD-10-CM

## 2023-07-26 DIAGNOSIS — R74.8 ACID PHOSPHATASE ELEVATED: ICD-10-CM

## 2023-07-26 DIAGNOSIS — R74.8 ELEVATED LIVER ENZYMES: Primary | ICD-10-CM

## 2023-07-26 LAB — LABCORP SPECIMEN COLLECTION: NORMAL

## 2023-07-26 PROCEDURE — 99204 OFFICE O/P NEW MOD 45 MIN: CPT | Performed by: NURSE PRACTITIONER

## 2023-07-26 PROCEDURE — 99203 OFFICE O/P NEW LOW 30 MIN: CPT | Performed by: NURSE PRACTITIONER

## 2023-07-26 ASSESSMENT — PATIENT HEALTH QUESTIONNAIRE - PHQ9
SUM OF ALL RESPONSES TO PHQ QUESTIONS 1-9: 0
2. FEELING DOWN, DEPRESSED OR HOPELESS: 0
SUM OF ALL RESPONSES TO PHQ QUESTIONS 1-9: 0
SUM OF ALL RESPONSES TO PHQ9 QUESTIONS 1 & 2: 0
1. LITTLE INTEREST OR PLEASURE IN DOING THINGS: 0

## 2023-07-26 NOTE — PROGRESS NOTES
Ultrasound of liver. Echogenic consistent with steatotic liver disease (SLD). Hepatomegaly. No liver mass lesions. No dilated bile ducts. No ascites. OTHER TESTING:  Not available or performed    FOLLOW-UP:  All of the issues listed above in the Assessment and Plan were discussed with the patient. All questions were answered. The patient expressed a clear understanding of the above. [address] [place] [address]  in 2 weeks after liver biopsy.       Mackenzie Mas AGPCNP-BC  [address] [place] [address] Liver [place] [facility]  [address], [address] [place] [address]  [phone]

## 2023-07-27 ENCOUNTER — OFFICE VISIT (OUTPATIENT)
Age: 33
End: 2023-07-27
Payer: MEDICAID

## 2023-07-27 VITALS
SYSTOLIC BLOOD PRESSURE: 122 MMHG | WEIGHT: 240 LBS | BODY MASS INDEX: 39.99 KG/M2 | HEART RATE: 102 BPM | HEIGHT: 65 IN | TEMPERATURE: 97 F | RESPIRATION RATE: 18 BRPM | DIASTOLIC BLOOD PRESSURE: 80 MMHG | OXYGEN SATURATION: 98 %

## 2023-07-27 DIAGNOSIS — E11.9 TYPE 2 DIABETES MELLITUS WITHOUT COMPLICATION, WITHOUT LONG-TERM CURRENT USE OF INSULIN (HCC): Primary | ICD-10-CM

## 2023-07-27 DIAGNOSIS — F51.01 PRIMARY INSOMNIA: ICD-10-CM

## 2023-07-27 LAB
A1AT SERPL-MCNC: 247 MG/DL (ref 100–188)
ALBUMIN SERPL-MCNC: 4.9 G/DL (ref 3.9–4.9)
ALP SERPL-CCNC: 75 IU/L (ref 44–121)
ALT SERPL-CCNC: 162 IU/L (ref 0–32)
AST SERPL-CCNC: 192 IU/L (ref 0–40)
BASOPHILS # BLD AUTO: 0.1 X10E3/UL (ref 0–0.2)
BASOPHILS NFR BLD AUTO: 1 %
BILIRUB DIRECT SERPL-MCNC: 0.19 MG/DL (ref 0–0.4)
BILIRUB SERPL-MCNC: 0.4 MG/DL (ref 0–1.2)
BUN SERPL-MCNC: 10 MG/DL (ref 6–20)
BUN/CREAT SERPL: 16 (ref 9–23)
C-ANCA TITR SER IF: NORMAL TITER
CALCIUM SERPL-MCNC: 10.1 MG/DL (ref 8.7–10.2)
CERULOPLASMIN SERPL-MCNC: 50.5 MG/DL (ref 19–39)
CHLORIDE SERPL-SCNC: 102 MMOL/L (ref 96–106)
CO2 SERPL-SCNC: 19 MMOL/L (ref 20–29)
CREAT SERPL-MCNC: 0.62 MG/DL (ref 0.57–1)
EGFRCR SERPLBLD CKD-EPI 2021: 122 ML/MIN/1.73
EOSINOPHIL # BLD AUTO: 0.5 X10E3/UL (ref 0–0.4)
EOSINOPHIL NFR BLD AUTO: 4 %
ERYTHROCYTE [DISTWIDTH] IN BLOOD BY AUTOMATED COUNT: 13.2 % (ref 11.7–15.4)
FERRITIN SERPL-MCNC: 270 NG/ML (ref 15–150)
GLUCOSE SERPL-MCNC: 81 MG/DL (ref 70–99)
HAV AB SER QL IA: NEGATIVE
HBV CORE AB SERPL QL IA: NEGATIVE
HBV SURFACE AB SER QL: REACTIVE
HBV SURFACE AG SERPL QL IA: NEGATIVE
HCT VFR BLD AUTO: 40.2 % (ref 34–46.6)
HCV AB SERPL QL IA: NORMAL
HCV IGG SERPL QL IA: NON REACTIVE
HGB BLD-MCNC: 12.8 G/DL (ref 11.1–15.9)
IMM GRANULOCYTES # BLD AUTO: 0 X10E3/UL (ref 0–0.1)
IMM GRANULOCYTES NFR BLD AUTO: 0 %
IRON SATN MFR SERPL: 11 % (ref 15–55)
IRON SERPL-MCNC: 64 UG/DL (ref 27–159)
LYMPHOCYTES # BLD AUTO: 4.9 X10E3/UL (ref 0.7–3.1)
LYMPHOCYTES NFR BLD AUTO: 39 %
MCH RBC QN AUTO: 28.3 PG (ref 26.6–33)
MCHC RBC AUTO-ENTMCNC: 31.8 G/DL (ref 31.5–35.7)
MCV RBC AUTO: 89 FL (ref 79–97)
MONOCYTES # BLD AUTO: 0.7 X10E3/UL (ref 0.1–0.9)
MONOCYTES NFR BLD AUTO: 5 %
NEUTROPHILS # BLD AUTO: 6.2 X10E3/UL (ref 1.4–7)
NEUTROPHILS NFR BLD AUTO: 51 %
P-ANCA ATYPICAL TITR SER IF: NORMAL TITER
P-ANCA TITR SER IF: NORMAL TITER
PLATELET # BLD AUTO: 542 X10E3/UL (ref 150–450)
POTASSIUM SERPL-SCNC: 3.9 MMOL/L (ref 3.5–5.2)
PROT SERPL-MCNC: 7.9 G/DL (ref 6–8.5)
RBC # BLD AUTO: 4.53 X10E6/UL (ref 3.77–5.28)
SODIUM SERPL-SCNC: 139 MMOL/L (ref 134–144)
TIBC SERPL-MCNC: 599 UG/DL (ref 250–450)
UIBC SERPL-MCNC: 535 UG/DL (ref 131–425)
WBC # BLD AUTO: 12.4 X10E3/UL (ref 3.4–10.8)

## 2023-07-27 PROCEDURE — 3052F HG A1C>EQUAL 8.0%<EQUAL 9.0%: CPT | Performed by: NURSE PRACTITIONER

## 2023-07-27 PROCEDURE — 99213 OFFICE O/P EST LOW 20 MIN: CPT | Performed by: NURSE PRACTITIONER

## 2023-07-28 LAB
ANA SER QL IF: NEGATIVE
LABORATORY COMMENT REPORT: NORMAL
SMA IGG SER-ACNC: 14 UNITS (ref 0–19)

## 2023-07-30 RX ORDER — ZOLPIDEM TARTRATE 5 MG/1
5 TABLET ORAL NIGHTLY PRN
Qty: 60 TABLET | Refills: 0
Start: 2023-07-30 | End: 2023-09-28

## 2023-07-30 NOTE — PROGRESS NOTES
Subjective:     Norma Esparza is a 34 y.o. female who presents today with the following:  Chief Complaint   Patient presents with    Diabetes       Patient Active Problem List   Diagnosis    Insomnia disorder with non-sleep disorder mental comorbidity    Recurrent major depressive disorder, in partial remission ([address])    Gastrointestinal problem    SANDY (generalized anxiety disorder)    Obstructive sleep apnea    Elevated liver enzymes         COMPLIANT WITH MEDICATION:  Denies chest pain, dyspnea, palpitations, headache and blurred vision. Blood pressure normotensive. Diabetes. Followed by Dr. Chana Mcarthur endocrinologist.  Jamia Chung controlled moderately  Hypoglycemia: none  Tolerating current treatment well  Current medications include  glipizide, trulicity and metformin as noted below    No results found for: HBA1C, GLU, GESTF, GLUCPOC, MICROALBUMIN, MCA2, MCAU, LDL, LDLC, ILANA, CREAPOC, CREA  No results found for: MCA2, MCAU, MCAU2    Last eye exam performed  greather than 1 year ago and was normal.    Last foot exam 03/03/2023 performed less than 1 year ago. warm, good capillary refill      depression screening addressed not at risk    abuse screening addressed denies    learning assessment addressed reviewed nurses notes    fall risk addressed not at risk    HM: addressed    ROS:  Gen: denies fever, chills, fatigue, weight loss, weight gain  HEENT:denies blurry vision, nasal congestion, sore throat  Resp: denies dypsnea, cough, wheezing  CV: denies chest pain radiating to the jaws or arms, palpitations, lower extremity edema  Abd: denies nausea, vomiting, diarrhea, constipation  Neuro: denies numbness/tingling  Endo: denies polyuria, polydipsia, heat/cold intolerance  Heme: no lymphadenopathy    Allergies   Allergen Reactions    Macadamia Nut Oil Hives     Hives and tongue swelling to almonds    Penicillins Other (See Comments)     Family hx of allergies to PCN.          Current Outpatient Medications:     zolpidem

## 2023-08-01 ENCOUNTER — TELEPHONE (OUTPATIENT)
Age: 33
End: 2023-08-01

## 2023-08-01 NOTE — TELEPHONE ENCOUNTER
Patient called and would like to be contacted to discuss her lab results from 7/26/23.  Please advise

## 2023-08-03 DIAGNOSIS — F51.01 PRIMARY INSOMNIA: ICD-10-CM

## 2023-08-03 NOTE — TELEPHONE ENCOUNTER
Last OV 7/27/2023     Requested Prescriptions     Pending Prescriptions Disp Refills    zolpidem (AMBIEN) 5 MG tablet 60 tablet 0     Sig: Take 1 tablet by mouth nightly as needed for Sleep for up to 60 days.  Max Daily Amount: 5 mg

## 2023-08-04 RX ORDER — ZOLPIDEM TARTRATE 5 MG/1
5 TABLET ORAL NIGHTLY PRN
Qty: 30 TABLET | Refills: 0 | Status: SHIPPED | OUTPATIENT
Start: 2023-08-04 | End: 2023-09-03

## 2023-08-09 ENCOUNTER — HOSPITAL ENCOUNTER (OUTPATIENT)
Facility: HOSPITAL | Age: 33
Setting detail: OUTPATIENT SURGERY
Discharge: HOME | End: 2023-08-09
Attending: INTERNAL MEDICINE | Admitting: INTERNAL MEDICINE
Payer: MEDICAID

## 2023-08-09 ENCOUNTER — APPOINTMENT (OUTPATIENT)
Facility: HOSPITAL | Age: 33
End: 2023-08-09
Attending: INTERNAL MEDICINE
Payer: MEDICAID

## 2023-08-09 VITALS
WEIGHT: 240.6 LBS | HEART RATE: 93 BPM | SYSTOLIC BLOOD PRESSURE: 134 MMHG | OXYGEN SATURATION: 98 % | HEIGHT: 65 IN | RESPIRATION RATE: 16 BRPM | TEMPERATURE: 97 F | DIASTOLIC BLOOD PRESSURE: 80 MMHG | BODY MASS INDEX: 40.08 KG/M2

## 2023-08-09 DIAGNOSIS — R79.89 ELEVATED LFTS: ICD-10-CM

## 2023-08-09 LAB
GLUCOSE BLD STRIP.AUTO-MCNC: 123 MG/DL (ref 70–110)
HCG UR QL: NEGATIVE

## 2023-08-09 PROCEDURE — 7100000011 HC PHASE II RECOVERY - ADDTL 15 MIN: Performed by: INTERNAL MEDICINE

## 2023-08-09 PROCEDURE — 47000 NEEDLE BIOPSY OF LIVER PERQ: CPT | Performed by: INTERNAL MEDICINE

## 2023-08-09 PROCEDURE — 76705 ECHO EXAM OF ABDOMEN: CPT

## 2023-08-09 PROCEDURE — 88307 TISSUE EXAM BY PATHOLOGIST: CPT

## 2023-08-09 PROCEDURE — 7100000010 HC PHASE II RECOVERY - FIRST 15 MIN: Performed by: INTERNAL MEDICINE

## 2023-08-09 PROCEDURE — 3600007502: Performed by: INTERNAL MEDICINE

## 2023-08-09 PROCEDURE — 82962 GLUCOSE BLOOD TEST: CPT

## 2023-08-09 PROCEDURE — 2709999900 HC NON-CHARGEABLE SUPPLY: Performed by: INTERNAL MEDICINE

## 2023-08-09 PROCEDURE — 88313 SPECIAL STAINS GROUP 2: CPT

## 2023-08-09 PROCEDURE — 3600007512: Performed by: INTERNAL MEDICINE

## 2023-08-09 PROCEDURE — 2580000003 HC RX 258: Performed by: INTERNAL MEDICINE

## 2023-08-09 PROCEDURE — 76942 ECHO GUIDE FOR BIOPSY: CPT | Performed by: INTERNAL MEDICINE

## 2023-08-09 PROCEDURE — 81025 URINE PREGNANCY TEST: CPT

## 2023-08-09 PROCEDURE — 6360000002 HC RX W HCPCS: Performed by: INTERNAL MEDICINE

## 2023-08-09 PROCEDURE — 2500000003 HC RX 250 WO HCPCS: Performed by: INTERNAL MEDICINE

## 2023-08-09 RX ORDER — LIDOCAINE HYDROCHLORIDE 10 MG/ML
10 INJECTION, SOLUTION EPIDURAL; INFILTRATION; INTRACAUDAL; PERINEURAL ONCE
Status: DISCONTINUED | OUTPATIENT
Start: 2023-08-09 | End: 2023-08-09 | Stop reason: HOSPADM

## 2023-08-09 RX ORDER — SODIUM CHLORIDE 0.9 % (FLUSH) 0.9 %
5-40 SYRINGE (ML) INJECTION PRN
Status: CANCELLED | OUTPATIENT
Start: 2023-08-09

## 2023-08-09 RX ORDER — SODIUM CHLORIDE 0.9 % (FLUSH) 0.9 %
5-40 SYRINGE (ML) INJECTION EVERY 12 HOURS SCHEDULED
Status: CANCELLED | OUTPATIENT
Start: 2023-08-09

## 2023-08-09 RX ORDER — SODIUM CHLORIDE 9 MG/ML
INJECTION, SOLUTION INTRAVENOUS PRN
Status: DISCONTINUED | OUTPATIENT
Start: 2023-08-09 | End: 2023-08-09 | Stop reason: HOSPADM

## 2023-08-09 RX ORDER — FENTANYL CITRATE 50 UG/ML
25 INJECTION, SOLUTION INTRAMUSCULAR; INTRAVENOUS AS NEEDED
Status: DISCONTINUED | OUTPATIENT
Start: 2023-08-09 | End: 2023-08-09 | Stop reason: HOSPADM

## 2023-08-09 RX ORDER — ONDANSETRON 2 MG/ML
2 INJECTION INTRAMUSCULAR; INTRAVENOUS AS NEEDED
Status: DISCONTINUED | OUTPATIENT
Start: 2023-08-09 | End: 2023-08-09 | Stop reason: HOSPADM

## 2023-08-09 RX ORDER — LIDOCAINE HYDROCHLORIDE 10 MG/ML
INJECTION, SOLUTION EPIDURAL; INFILTRATION; INTRACAUDAL; PERINEURAL PRN
Status: DISCONTINUED | OUTPATIENT
Start: 2023-08-09 | End: 2023-08-09 | Stop reason: ALTCHOICE

## 2023-08-09 RX ADMIN — FENTANYL CITRATE 25 MCG: 50 INJECTION, SOLUTION INTRAMUSCULAR; INTRAVENOUS at 09:08

## 2023-08-09 RX ADMIN — FENTANYL CITRATE 25 MCG: 50 INJECTION, SOLUTION INTRAMUSCULAR; INTRAVENOUS at 09:59

## 2023-08-09 RX ADMIN — SODIUM CHLORIDE: 9 INJECTION, SOLUTION INTRAVENOUS at 07:36

## 2023-08-09 RX ADMIN — FENTANYL CITRATE 25 MCG: 50 INJECTION, SOLUTION INTRAMUSCULAR; INTRAVENOUS at 08:36

## 2023-08-09 ASSESSMENT — PAIN DESCRIPTION - DESCRIPTORS
DESCRIPTORS: PRESSURE
DESCRIPTORS: SHARP;PRESSURE

## 2023-08-09 ASSESSMENT — PAIN SCALES - GENERAL
PAINLEVEL_OUTOF10: 2
PAINLEVEL_OUTOF10: 0
PAINLEVEL_OUTOF10: 0
PAINLEVEL_OUTOF10: 2
PAINLEVEL_OUTOF10: 0
PAINLEVEL_OUTOF10: 4
PAINLEVEL_OUTOF10: 6
PAINLEVEL_OUTOF10: 5
PAINLEVEL_OUTOF10: 6

## 2023-08-09 ASSESSMENT — PAIN DESCRIPTION - PAIN TYPE
TYPE: CHRONIC PAIN
TYPE: ACUTE PAIN

## 2023-08-09 ASSESSMENT — PAIN DESCRIPTION - ONSET
ONSET: ON-GOING
ONSET: ON-GOING

## 2023-08-09 ASSESSMENT — PAIN DESCRIPTION - FREQUENCY
FREQUENCY: CONTINUOUS

## 2023-08-09 ASSESSMENT — PAIN DESCRIPTION - LOCATION
LOCATION: BACK
LOCATION: ABDOMEN

## 2023-08-09 ASSESSMENT — PAIN DESCRIPTION - ORIENTATION
ORIENTATION: RIGHT

## 2023-08-09 ASSESSMENT — PAIN - FUNCTIONAL ASSESSMENT
PAIN_FUNCTIONAL_ASSESSMENT: 0-10
PAIN_FUNCTIONAL_ASSESSMENT: ACTIVITIES ARE NOT PREVENTED

## 2023-08-09 NOTE — H&P
@RESUFAST(INR:3,PROTIME:3)      PRE-PROCEDURE DOCUMENTATION  The risks of the procedure were discussed with the patient. These included reaction to anesthesia, pain, perforation and bleeding. All questions were answered. The patient wishes to proceed with the procedure. ASA status  2  Airway assessment:  NA      ASSESSMENT AND PLAN:  Liver biopsy under ultrasound guidance.     Nicolás Apodaca MD  [address] [facility]  [address] [facility], [address] [place] [address]

## 2023-08-09 NOTE — PERIOP NOTE
FACE TO FACE Discharged  instruction given to family and agreed with the plan. Discharged includes diet, activity limitations , medication to continue and f/u appointment ON aug 23rd at 1245 pm and verified by the office. Shan Silva

## 2023-08-09 NOTE — DISCHARGE INSTRUCTIONS
MD Yessica, FACP, [place], [place]      BRADLEY Mann, [facility]   Abel Chauhan, [facility]-   Anyi Butt, FNP-MYAH Sparks, FNP-C   Gregorio Cisneros, [facility]-BC   [facility]      [address]   at [facility]   [address] [facility], [address] [facility], [address] [facility]   [phone]   FAX: [phone]   at [facility], [address] [facility], [address]   [phone]   FAX: [phone]         LIVER BIOPSY DISCHARGE INSTRUCTIONS      Billy Kaminski  1990  Date: 8/9/2023    DIET:    Jannette Gibbs may resume your previous diet. ACTIVITIES:  Rest quietly the rest of today. You should not lift any objects more than 20 pounds for the next 2 days. If you work sitting down without strenuous activity you may return to work tomorrow. If you exert yourself or do heavy lifting at work you should take tomorrow off. Do not drive or operate hazardous machinery for 12 hours after you are discharged from this procedure. SPECIAL INSTRUCTIONS:  Do not use any aspirin or non-steroidal (Motin, Advil, Naproxen, etc) pain medications for the next 2 days. You may use extra-strength Tylenol (acetaminophen) if you experience pain or discomfort later today. Restarting blood thinners: If you were taking blood thinners prior to the procedure you can restart these in 2 days. Call the [address] [place] [address],[address] [place] [address] office if you experience any of the following:  Persistent or severe abdominal pain. Persistent or severe abdominal distention. Fever and chills   Nausea and vomiting. New or unusual symptoms. Follow-up care: You should have a follow up appointment with Dr. Brandt Abreu to review the results of the liver biopsy results in 2 weeks.   If you do not have an appointment please

## 2023-08-09 NOTE — OP NOTE
[address] [facility] [address] MD Janeen, FACP, [place], [place]      BRADLEY Philip, [facility]   Rod Rothman, [facility]-AG   Brianda Savage, FNPPARAMJIT Neal, FNP-C   Benson Downey, [facility]   [place] [address], [place]   at [facility]   [address] [facility], [address] [facility], [address] [facility]   [phone]   FAX: [phone]  Liver [place] of [facility]   at [facility], [address] [place] [address], [address] [facility]   [phone]   FAX: [phone]         LIVER BIOPSY PROCEDURE NOTE    NAME:  Dacia Sullivan  :  1990  MRN:  [id number]    INDICATIONS/PRE-OPERATIVE  DIAGNOSIS:  Elevated liver transaminases     : Brenda Crenshaw MD    SURGICAL ASSISTANT:  None    PROSTHETIC DEVICES, TISSUE GRAFTS, TRANSPLANTED ORGANS:  Not applicable    SEDATION: 1% Lidocaine injection 10 ml    PROCEDURE:  Informed consent to perform the procedure was obtained from the patient. The patient was positioned on the edge of the stretcher lying flat in the supine position. Ultrasound was utilized to image the liver. The diaphragm and any major mass lesion or vascular structures within the liver were identified. An appropriate site for liver biopsy was identified. The distance from the surface of the skin to the liver capsule was 5 cm. This area was prepped with betadine and draped in sterile fashion. The skin was infiltrated with 1% lidocaine. The deeper subcutanous tissues and liver capsule overlying the biopsy site were then infiltrated with 1% lidocaine until appropriate anesthesia was obtained. A small incision was made in the skin so the biopsy devise could be easily inserted. A total of 2 passes with the 16 gauge Bard biopsy devise was then made into the liver.   Core(s) of liver tissue totaling 3 cm in length were obtained

## 2023-08-10 RX ORDER — ATORVASTATIN CALCIUM 40 MG/1
40 TABLET, FILM COATED ORAL DAILY
Qty: 90 TABLET | Refills: 1 | Status: SHIPPED | OUTPATIENT
Start: 2023-08-10

## 2023-08-15 RX ORDER — FENOFIBRATE 145 MG/1
145 TABLET, COATED ORAL DAILY
Qty: 90 TABLET | Refills: 1 | Status: SHIPPED | OUTPATIENT
Start: 2023-08-15

## 2023-08-15 RX ORDER — GLIPIZIDE 2.5 MG/1
2.5 TABLET, EXTENDED RELEASE ORAL DAILY
Qty: 90 TABLET | Refills: 1 | Status: SHIPPED | OUTPATIENT
Start: 2023-08-15

## 2023-08-15 NOTE — TELEPHONE ENCOUNTER
From: Liban Tobar  To:  Office of Eden Griffin  Sent: 8/15/2023 2:26 PM EDT  Subject: Medication Renewal Request    Refills have been requested for the following medications:     fenofibrate (TRICOR) 145 MG tablet BELLO Stephen - NP]     glipiZIDE (GLUCOTROL XL) 2.5 MG extended release tablet BELLO Stephen - NP]    Preferred pharmacy: [place] [address], [address] [place] [address] [phone] - F [phone]

## 2023-08-15 NOTE — TELEPHONE ENCOUNTER
Patient requesting refill on     Requested Prescriptions     Pending Prescriptions Disp Refills    fenofibrate (TRICOR) 145 MG tablet 90 tablet 1     Sig: Take 1 tablet by mouth daily    glipiZIDE (GLUCOTROL XL) 2.5 MG extended release tablet 90 tablet 1     Sig: Take 1 tablet by mouth daily        Last OV 7/27/2023

## 2023-08-21 ENCOUNTER — PATIENT MESSAGE (OUTPATIENT)
Age: 33
End: 2023-08-21

## 2023-08-21 ENCOUNTER — TELEPHONE (OUTPATIENT)
Age: 33
End: 2023-08-21

## 2023-08-21 ENCOUNTER — NURSE TRIAGE (OUTPATIENT)
Dept: OTHER | Facility: CLINIC | Age: 33
End: 2023-08-21

## 2023-08-21 NOTE — TELEPHONE ENCOUNTER
----- Message from Maria Esther Meier sent at 8/21/2023  2:26 PM EDT -----  Subject: Appointment Request    Reason for Call: Established Patient Appointment needed: Semi-Urgent   Return from RN Triage    QUESTIONS    Reason for appointment request? Available appointments did not meet   patient need     Additional Information for Provider? Patient was triaged due to swollen   right big toe.  Patient is only able to come in on Wed, Thursday, or Friday   latest in the day (she is not able to come in this Wed).  ---------------------------------------------------------------------------  --------------  [address]  [phone]; OK to leave message on voicemail  ---------------------------------------------------------------------------  --------------  SCRIPT ANSWERS

## 2023-08-21 NOTE — TELEPHONE ENCOUNTER
[facility]    Received call from [facility] with Jobyal. Subjective: Caller states \"having swelling in right big toe\"     Current Symptoms: Right big toe swelling  Has an ingrown nail  No known injury  Also with redness  No drainage    Onset: 1 week ago; gradual, worsening    Associated Symptoms: NA    Pain Severity: not too painful at this time    Temperature:  denies    What has been tried: epsom salt    LMP: NA Pregnant: NA    Recommended disposition: See PCP within 3 Days    Care advice provided, patient verbalizes understanding; denies any other questions or concerns; instructed to call back for any new or worsening symptoms. Patient/Caller agrees with recommended disposition; writer provided warm transfer to [facility] for appointment scheduling    Attention Provider: Thank you for allowing me to participate in the care of your patient. The patient was connected to triage in response to information provided to the ECC/PSC. Please do not respond through this encounter as the response is not directed to a shared pool. Reason for Disposition   Localized redness and swelling of skin around nail (i.e., cuticle area or nail fold)    Protocols used:  Toe Pain-ADULT-[place]

## 2023-08-22 DIAGNOSIS — F41.1 GAD (GENERALIZED ANXIETY DISORDER): ICD-10-CM

## 2023-08-22 DIAGNOSIS — F33.41 RECURRENT MAJOR DEPRESSIVE DISORDER, IN PARTIAL REMISSION (HCC): ICD-10-CM

## 2023-08-23 ENCOUNTER — OFFICE VISIT (OUTPATIENT)
Age: 33
End: 2023-08-23
Payer: MEDICAID

## 2023-08-23 VITALS
DIASTOLIC BLOOD PRESSURE: 91 MMHG | HEART RATE: 101 BPM | WEIGHT: 239 LBS | HEIGHT: 65 IN | BODY MASS INDEX: 39.82 KG/M2 | OXYGEN SATURATION: 95 % | TEMPERATURE: 97.9 F | SYSTOLIC BLOOD PRESSURE: 135 MMHG

## 2023-08-23 DIAGNOSIS — K75.81 NASH (NONALCOHOLIC STEATOHEPATITIS): Primary | ICD-10-CM

## 2023-08-23 PROCEDURE — 99214 OFFICE O/P EST MOD 30 MIN: CPT | Performed by: INTERNAL MEDICINE

## 2023-08-23 RX ORDER — FLUTICASONE PROPIONATE 50 MCG
SPRAY, SUSPENSION (ML) NASAL
COMMUNITY
Start: 2023-07-06

## 2023-08-23 RX ORDER — CETIRIZINE HYDROCHLORIDE 10 MG/1
10 TABLET ORAL DAILY
COMMUNITY

## 2023-08-23 RX ORDER — LIOTHYRONINE SODIUM 25 UG/1
25 TABLET ORAL DAILY
COMMUNITY
Start: 2021-11-08 | End: 2023-08-26

## 2023-08-23 RX ORDER — HYDROXYZINE 50 MG/1
TABLET, FILM COATED ORAL
COMMUNITY
End: 2023-08-26

## 2023-08-23 RX ORDER — VENLAFAXINE HYDROCHLORIDE 75 MG/1
CAPSULE, EXTENDED RELEASE ORAL
COMMUNITY
End: 2023-08-26

## 2023-08-23 RX ORDER — ATORVASTATIN CALCIUM 20 MG/1
20 TABLET, FILM COATED ORAL DAILY
COMMUNITY
Start: 2023-01-13 | End: 2023-08-26

## 2023-08-23 RX ORDER — NORETHINDRONE ACETATE/ETHINYL ESTRADIOL AND FERROUS FUMARATE 1MG-20(24)
KIT ORAL
COMMUNITY
End: 2023-08-23

## 2023-08-23 RX ORDER — ACETAMINOPHEN AND CODEINE PHOSPHATE 120; 12 MG/5ML; MG/5ML
SOLUTION ORAL
COMMUNITY
Start: 2023-08-17 | End: 2023-08-23

## 2023-08-23 RX ORDER — SULFAMETHOXAZOLE AND TRIMETHOPRIM 800; 160 MG/1; MG/1
1 TABLET ORAL
COMMUNITY
Start: 2011-10-05 | End: 2023-08-24 | Stop reason: ALTCHOICE

## 2023-08-23 RX ORDER — MINOCYCLINE HYDROCHLORIDE 50 MG/1
50 TABLET ORAL 2 TIMES DAILY
COMMUNITY
End: 2023-08-26

## 2023-08-23 RX ORDER — NORETHINDRONE ACETATE AND ETHINYL ESTRADIOL 1MG-20(21)
KIT ORAL
COMMUNITY

## 2023-08-23 ASSESSMENT — PATIENT HEALTH QUESTIONNAIRE - PHQ9
SUM OF ALL RESPONSES TO PHQ QUESTIONS 1-9: 0
1. LITTLE INTEREST OR PLEASURE IN DOING THINGS: 0
SUM OF ALL RESPONSES TO PHQ QUESTIONS 1-9: 0
SUM OF ALL RESPONSES TO PHQ QUESTIONS 1-9: 0
2. FEELING DOWN, DEPRESSED OR HOPELESS: 0
SUM OF ALL RESPONSES TO PHQ QUESTIONS 1-9: 0
SUM OF ALL RESPONSES TO PHQ9 QUESTIONS 1 & 2: 0

## 2023-08-23 NOTE — PROGRESS NOTES
performed    FOLLOW-UP:  All of the issues listed above in the Assessment and Plan were discussed with the patient. All questions were answered. The patient expressed a clear understanding of the above. Follow-up [address] in for screening and enrollment into a clinical trial for treatment of TAYLOR. The patient was given a follow-up appointment for 4 months in case she decides not to enter or is excluded from entering the clinical trial.  for routine monitoring.       Sanchez Gilliam MD  [facility]  [address], [address] [place] [address]  [id number] [place] [address], [address] [place] [address]

## 2023-08-24 ENCOUNTER — CLINICAL DOCUMENTATION (OUTPATIENT)
Age: 33
End: 2023-08-24

## 2023-08-24 ENCOUNTER — OFFICE VISIT (OUTPATIENT)
Age: 33
End: 2023-08-24
Payer: MEDICAID

## 2023-08-24 VITALS
HEIGHT: 65 IN | WEIGHT: 241 LBS | TEMPERATURE: 96.8 F | OXYGEN SATURATION: 98 % | RESPIRATION RATE: 18 BRPM | SYSTOLIC BLOOD PRESSURE: 140 MMHG | BODY MASS INDEX: 40.15 KG/M2 | HEART RATE: 107 BPM | DIASTOLIC BLOOD PRESSURE: 82 MMHG

## 2023-08-24 DIAGNOSIS — E11.65 POORLY CONTROLLED DIABETES MELLITUS (HCC): Primary | ICD-10-CM

## 2023-08-24 DIAGNOSIS — F51.01 PRIMARY INSOMNIA: ICD-10-CM

## 2023-08-24 DIAGNOSIS — E78.1 PURE HYPERTRIGLYCERIDEMIA: ICD-10-CM

## 2023-08-24 DIAGNOSIS — E11.65 TYPE 2 DIABETES MELLITUS WITH HYPERGLYCEMIA, WITHOUT LONG-TERM CURRENT USE OF INSULIN (HCC): ICD-10-CM

## 2023-08-24 DIAGNOSIS — M79.674 PAIN OF RIGHT GREAT TOE: ICD-10-CM

## 2023-08-24 DIAGNOSIS — K90.0 CELIAC DISEASE: ICD-10-CM

## 2023-08-24 PROCEDURE — 99214 OFFICE O/P EST MOD 30 MIN: CPT | Performed by: NURSE PRACTITIONER

## 2023-08-24 PROCEDURE — 3051F HG A1C>EQUAL 7.0%<8.0%: CPT | Performed by: NURSE PRACTITIONER

## 2023-08-24 RX ORDER — GLIPIZIDE 2.5 MG/1
2.5 TABLET, EXTENDED RELEASE ORAL DAILY
Qty: 90 TABLET | Refills: 0 | Status: SHIPPED | OUTPATIENT
Start: 2023-08-24 | End: 2023-08-25 | Stop reason: DRUGHIGH

## 2023-08-24 RX ORDER — ZOLPIDEM TARTRATE 10 MG/1
10 TABLET ORAL NIGHTLY PRN
Qty: 90 TABLET | Refills: 0 | Status: SHIPPED | OUTPATIENT
Start: 2023-08-24 | End: 2023-11-22

## 2023-08-24 RX ORDER — AMOXICILLIN AND CLAVULANATE POTASSIUM 875; 125 MG/1; MG/1
1 TABLET, FILM COATED ORAL 2 TIMES DAILY
Qty: 14 TABLET | Refills: 0 | Status: SHIPPED | OUTPATIENT
Start: 2023-08-24 | End: 2023-08-26

## 2023-08-24 ASSESSMENT — PATIENT HEALTH QUESTIONNAIRE - PHQ9
4. FEELING TIRED OR HAVING LITTLE ENERGY: 0
7. TROUBLE CONCENTRATING ON THINGS, SUCH AS READING THE NEWSPAPER OR WATCHING TELEVISION: 0
SUM OF ALL RESPONSES TO PHQ QUESTIONS 1-9: 0
10. IF YOU CHECKED OFF ANY PROBLEMS, HOW DIFFICULT HAVE THESE PROBLEMS MADE IT FOR YOU TO DO YOUR WORK, TAKE CARE OF THINGS AT HOME, OR GET ALONG WITH OTHER PEOPLE: 0
SUM OF ALL RESPONSES TO PHQ QUESTIONS 1-9: 0
6. FEELING BAD ABOUT YOURSELF - OR THAT YOU ARE A FAILURE OR HAVE LET YOURSELF OR YOUR FAMILY DOWN: 0
8. MOVING OR SPEAKING SO SLOWLY THAT OTHER PEOPLE COULD HAVE NOTICED. OR THE OPPOSITE, BEING SO FIGETY OR RESTLESS THAT YOU HAVE BEEN MOVING AROUND A LOT MORE THAN USUAL: 0
2. FEELING DOWN, DEPRESSED OR HOPELESS: 0
9. THOUGHTS THAT YOU WOULD BE BETTER OFF DEAD, OR OF HURTING YOURSELF: 0
3. TROUBLE FALLING OR STAYING ASLEEP: 0
SUM OF ALL RESPONSES TO PHQ QUESTIONS 1-9: 0
5. POOR APPETITE OR OVEREATING: 0
SUM OF ALL RESPONSES TO PHQ QUESTIONS 1-9: 0

## 2023-08-24 NOTE — PROGRESS NOTES
Patient information sent over to the research department by Dr. Michelle Arrington for possible enrollment into a clinical research trial for TAYLOR. Patient pre-screened for [employer] TAYLOR study and potentially qualifies. Reached out to patient via phone call, voicemail left. Pending response from patient. Addendum: Patient returned call, however   not interested in enrolling at this time due to requiring a liver biopsy at the end of the study (exclusionary item #23). Patient will consider in the future but as of right now patient voiced that she is not interested at this time due to that requirement. Pt encouraged to call the research department back if she changes her mind or has additional questions/concerns. Confirmed pt has a f/u visit with the [address] [place] [address],[address] on [id number].

## 2023-08-25 ENCOUNTER — OFFICE VISIT (OUTPATIENT)
Age: 33
End: 2023-08-25
Payer: MEDICAID

## 2023-08-25 VITALS
HEART RATE: 106 BPM | DIASTOLIC BLOOD PRESSURE: 94 MMHG | SYSTOLIC BLOOD PRESSURE: 130 MMHG | HEIGHT: 65 IN | BODY MASS INDEX: 40.35 KG/M2 | WEIGHT: 242.2 LBS

## 2023-08-25 DIAGNOSIS — E66.01 MORBID OBESITY (HCC): ICD-10-CM

## 2023-08-25 DIAGNOSIS — E78.1 HYPERTRIGLYCERIDEMIA: ICD-10-CM

## 2023-08-25 DIAGNOSIS — K76.0 NAFLD (NONALCOHOLIC FATTY LIVER DISEASE): ICD-10-CM

## 2023-08-25 DIAGNOSIS — E11.9 TYPE 2 DIABETES MELLITUS WITHOUT COMPLICATION, WITHOUT LONG-TERM CURRENT USE OF INSULIN (HCC): Primary | ICD-10-CM

## 2023-08-25 PROCEDURE — 99214 OFFICE O/P EST MOD 30 MIN: CPT | Performed by: GENERAL ACUTE CARE HOSPITAL

## 2023-08-25 PROCEDURE — 3051F HG A1C>EQUAL 7.0%<8.0%: CPT | Performed by: GENERAL ACUTE CARE HOSPITAL

## 2023-08-25 RX ORDER — PIOGLITAZONEHYDROCHLORIDE 15 MG/1
15 TABLET ORAL DAILY
Qty: 90 TABLET | Refills: 3 | Status: SHIPPED | OUTPATIENT
Start: 2023-08-25 | End: 2023-09-17 | Stop reason: SDUPTHER

## 2023-08-25 RX ORDER — FLASH GLUCOSE SCANNING READER
EACH MISCELLANEOUS
Qty: 2 EACH | Refills: 12 | Status: SHIPPED | OUTPATIENT
Start: 2023-08-25 | End: 2023-08-26 | Stop reason: SDUPTHER

## 2023-08-25 RX ORDER — DULAGLUTIDE 3 MG/.5ML
3 INJECTION, SOLUTION SUBCUTANEOUS WEEKLY
Qty: 2 ML | Refills: 3 | Status: SHIPPED | OUTPATIENT
Start: 2023-08-25 | End: 2023-09-17 | Stop reason: SDUPTHER

## 2023-08-25 RX ORDER — FLASH GLUCOSE SENSOR
KIT MISCELLANEOUS
Qty: 1 EACH | Refills: 0 | Status: SHIPPED | OUTPATIENT
Start: 2023-08-25 | End: 2023-08-26 | Stop reason: SDUPTHER

## 2023-08-25 RX ORDER — GLIPIZIDE 5 MG/1
5 TABLET, FILM COATED, EXTENDED RELEASE ORAL DAILY
Qty: 90 TABLET | Refills: 3 | Status: SHIPPED | OUTPATIENT
Start: 2023-08-25 | End: 2023-08-25

## 2023-08-25 NOTE — PROGRESS NOTES
[facility]   DR MARIAN BAH         REFERRED BY: Suni Escudero NP      REASON:  Uncontrolled type 2 diabetes mellitus     CHIEF COMPLAINT: evaluation of type 2 diabetes mellitus     HISTORY OF PRESENT ILLNESS:   Kavin Maxwell is a 34 y.o.  female with a PMHx as noted below who presents for evaluation of uncontrolled type 2 diabetes. Referred to us for new onset diabetes type 2, fatty liver disease and hypertriglyceridemia on 03/2023.     8/25/23  Ms Asher Acosta indicates she has been taking Trulicity and metformin regularly. She has not lost as much weight as she hoped for. She completed her blood work recently but results are not back at this time. No other medication changes at this time. She is following with liver specialist for fatty liver disease- OCHOA. 7/14/23   She indicates she feels overwhelmed with getting multiple diagnoses at 1 time but she is willing to work on improving her overall health. She is a mother of a 10year-old healthy boy      PMH:  Celiac disease   Diabetes type 2   Hyperlipidemia      Diabetes History:   Diabetes was diagnosed: 10/2022    Family History of diabetes is Negative    Hb A1c : 6.5% 03/01/2023  8.2% 06/15/2023      Diabetes-related Hospitalizations:denies     Current Home Regimen:  MTF 413RY daily  Trulicity 3.6CH weekly     Review of home glucose:  Not checking sugars regularly     Hypoglycemia:no     Diet: Buys gluten-free foods  3 meals per day, snacks 1-2 times per week  Breakfast sausage, eggs, cheese, biscuits, green smoothie  Lunch: Frozen foods tuna pack with a salad, mac & cheese  Dinner: Chicken, beef,  pork, shrimp, fish, broccoli, beans, [place] sprouts, asparagus, rice, pasta, potatoes  Snacks: Very occasionally would eat potato chips, popcorn, candy, ice cream, cake  Usually eats foods, cucumbers, pickles, lunch meat  Beverages: Juice,  unsweet tea, soda  Alcoholic drinks:  Once per month        Physical

## 2023-08-26 PROBLEM — E78.2 MIXED HYPERLIPIDEMIA: Status: ACTIVE | Noted: 2023-08-26

## 2023-08-26 PROBLEM — E78.1 PURE HYPERTRIGLYCERIDEMIA: Status: ACTIVE | Noted: 2023-08-26

## 2023-08-26 PROBLEM — K90.0 CELIAC DISEASE: Status: ACTIVE | Noted: 2023-08-26

## 2023-08-26 LAB
ALBUMIN SERPL-MCNC: 4.8 G/DL (ref 3.9–4.9)
ALBUMIN/GLOB SERPL: 1.7 {RATIO} (ref 1.2–2.2)
ALP SERPL-CCNC: 73 IU/L (ref 44–121)
ALT SERPL-CCNC: 122 IU/L (ref 0–32)
AST SERPL-CCNC: 133 IU/L (ref 0–40)
BILIRUB SERPL-MCNC: 0.3 MG/DL (ref 0–1.2)
BUN SERPL-MCNC: 14 MG/DL (ref 6–20)
BUN/CREAT SERPL: 18 (ref 9–23)
CALCIUM SERPL-MCNC: 10.1 MG/DL (ref 8.7–10.2)
CHLORIDE SERPL-SCNC: 101 MMOL/L (ref 96–106)
CHOLEST SERPL-MCNC: 133 MG/DL (ref 100–199)
CO2 SERPL-SCNC: 19 MMOL/L (ref 20–29)
CREAT SERPL-MCNC: 0.8 MG/DL (ref 0.57–1)
EGFRCR SERPLBLD CKD-EPI 2021: 101 ML/MIN/1.73
GLOBULIN SER CALC-MCNC: 2.9 G/DL (ref 1.5–4.5)
GLUCOSE SERPL-MCNC: 115 MG/DL (ref 70–99)
HBA1C MFR BLD: 7.7 % (ref 4.8–5.6)
HDLC SERPL-MCNC: 12 MG/DL
IMP & REVIEW OF LAB RESULTS: NORMAL
LDLC SERPL CALC-MCNC: 37 MG/DL (ref 0–99)
POTASSIUM SERPL-SCNC: 4.3 MMOL/L (ref 3.5–5.2)
PROT SERPL-MCNC: 7.7 G/DL (ref 6–8.5)
SODIUM SERPL-SCNC: 141 MMOL/L (ref 134–144)
TRIGL SERPL-MCNC: 601 MG/DL (ref 0–149)
VLDLC SERPL CALC-MCNC: 84 MG/DL (ref 5–40)

## 2023-08-26 RX ORDER — FLASH GLUCOSE SENSOR
KIT MISCELLANEOUS
Qty: 1 EACH | Refills: 0 | Status: SHIPPED | OUTPATIENT
Start: 2023-08-26

## 2023-08-26 NOTE — PROGRESS NOTES
Subjective:     Jovita Sloan is a 34 y.o. female who presents today with the following:  Chief Complaint   Patient presents with    Nail Problem    Toe Pain     Right big toe    Diabetes    Insomnia       Patient Active Problem List   Diagnosis    Insomnia disorder with non-sleep disorder mental comorbidity    Recurrent major depressive disorder, in partial remission ([address])    Gastrointestinal problem    SANDY (generalized anxiety disorder)    Obstructive sleep apnea    Elevated liver enzymes    Celiac disease    Pure hypertriglyceridemia         COMPLIANT WITH MEDICATION:   Hypertriglyceridemia; Denies chest pain, dyspnea, palpitations, headache and blurred vision. Blood pressure elevated today. Diabetes. Sugars controlled poorly  Hypoglycemia: none  Tolerating current treatment moderately. MS. Shaka Ham endorses does not like taking trulicity because she is gaining weight. She is trying to check her blood glucose some days up to 6 times a day to achieve better control by identifying trigger foods that raise her blood sugar. She also endorses with her anxiety she Is having a difficult time sticking her finger. We discussed a CGM would help her gain better control of her DM to help prevent complications. Current medications include  glipizide 2.5 mg daily. Trulicity 1.5 mg SC injection once a week. On hold with weight gain. Insomnia; Ms. Shaka Ham endorses she tried for a month to wean down from Ambien 10 mg to 5 mg and had difficulty getting to sleep and staying asleep. When she went back on 10 mg of Ambien she had no difficulty sleeping. Great toe pain; Ms. Shaka Ham endorses she is followed by podiatry and was treated for an ingrown toe nail right toe. She endorses she has been soaking the toe in epsom salts.         No results found for: HBA1C, GLU, GESTF, GLUCPOC, MICROALBUMIN, MCA2, MCAU, LDL, LDLC, ILANA, CREAPOC, CREA  No results found for: MCA2, MCAU, MCAU2    Last eye exam performed  alee

## 2023-08-28 ENCOUNTER — TELEPHONE (OUTPATIENT)
Age: 33
End: 2023-08-28

## 2023-08-28 ENCOUNTER — PATIENT MESSAGE (OUTPATIENT)
Age: 33
End: 2023-08-28

## 2023-08-28 NOTE — TELEPHONE ENCOUNTER
Patient informed her Jayson List was approved Case # [id number] from 8-2-2023 thru -. Shorty Sen said ou need to call the pharmacy, Sujatha Lugo had a question about the RX .  Patient wants a call back when that's has been clearified

## 2023-08-30 ENCOUNTER — TELEPHONE (OUTPATIENT)
Age: 33
End: 2023-08-30

## 2023-08-30 NOTE — TELEPHONE ENCOUNTER
Pt called 8/30 @ 9:13 AM    Pt stated she was returning Dr Thaddeus Saavedra call directly. This was in regards to her treatment plan. Pt stated she will be near her phone so any time is a good time to reach back out to her.     Pt# [phone]

## 2023-09-05 PROBLEM — K75.81 NASH (NONALCOHOLIC STEATOHEPATITIS): Status: ACTIVE | Noted: 2023-07-26

## 2023-09-06 ENCOUNTER — OFFICE VISIT (OUTPATIENT)
Age: 33
End: 2023-09-06
Payer: MEDICAID

## 2023-09-06 VITALS
WEIGHT: 242 LBS | RESPIRATION RATE: 18 BRPM | SYSTOLIC BLOOD PRESSURE: 128 MMHG | DIASTOLIC BLOOD PRESSURE: 80 MMHG | HEIGHT: 65 IN | BODY MASS INDEX: 40.32 KG/M2 | TEMPERATURE: 98.9 F | HEART RATE: 104 BPM | OXYGEN SATURATION: 98 %

## 2023-09-06 DIAGNOSIS — J06.9 VIRAL URI: Primary | ICD-10-CM

## 2023-09-06 PROBLEM — R19.8 GASTROINTESTINAL PROBLEM: Status: RESOLVED | Noted: 2019-04-25 | Resolved: 2023-09-06

## 2023-09-06 PROCEDURE — 99213 OFFICE O/P EST LOW 20 MIN: CPT

## 2023-09-06 RX ORDER — METHYLPREDNISOLONE 4 MG/1
TABLET ORAL
Qty: 1 KIT | Refills: 0 | Status: SHIPPED | OUTPATIENT
Start: 2023-09-06 | End: 2023-09-12

## 2023-09-06 SDOH — ECONOMIC STABILITY: TRANSPORTATION INSECURITY
IN THE PAST 12 MONTHS, HAS THE LACK OF TRANSPORTATION KEPT YOU FROM MEDICAL APPOINTMENTS OR FROM GETTING MEDICATIONS?: NO

## 2023-09-06 SDOH — ECONOMIC STABILITY: TRANSPORTATION INSECURITY
IN THE PAST 12 MONTHS, HAS LACK OF TRANSPORTATION KEPT YOU FROM MEETINGS, WORK, OR FROM GETTING THINGS NEEDED FOR DAILY LIVING?: NO

## 2023-09-06 ASSESSMENT — ENCOUNTER SYMPTOMS
COUGH: 1
SHORTNESS OF BREATH: 0
GASTROINTESTINAL NEGATIVE: 1
WHEEZING: 0
RHINORRHEA: 1

## 2023-09-06 ASSESSMENT — PATIENT HEALTH QUESTIONNAIRE - PHQ9
7. TROUBLE CONCENTRATING ON THINGS, SUCH AS READING THE NEWSPAPER OR WATCHING TELEVISION: 0
10. IF YOU CHECKED OFF ANY PROBLEMS, HOW DIFFICULT HAVE THESE PROBLEMS MADE IT FOR YOU TO DO YOUR WORK, TAKE CARE OF THINGS AT HOME, OR GET ALONG WITH OTHER PEOPLE: 0
SUM OF ALL RESPONSES TO PHQ9 QUESTIONS 1 & 2: 0
3. TROUBLE FALLING OR STAYING ASLEEP: 0
4. FEELING TIRED OR HAVING LITTLE ENERGY: 0
9. THOUGHTS THAT YOU WOULD BE BETTER OFF DEAD, OR OF HURTING YOURSELF: 0
8. MOVING OR SPEAKING SO SLOWLY THAT OTHER PEOPLE COULD HAVE NOTICED. OR THE OPPOSITE, BEING SO FIGETY OR RESTLESS THAT YOU HAVE BEEN MOVING AROUND A LOT MORE THAN USUAL: 0
SUM OF ALL RESPONSES TO PHQ QUESTIONS 1-9: 0
6. FEELING BAD ABOUT YOURSELF - OR THAT YOU ARE A FAILURE OR HAVE LET YOURSELF OR YOUR FAMILY DOWN: 0
2. FEELING DOWN, DEPRESSED OR HOPELESS: 0
SUM OF ALL RESPONSES TO PHQ QUESTIONS 1-9: 0
1. LITTLE INTEREST OR PLEASURE IN DOING THINGS: 0
SUM OF ALL RESPONSES TO PHQ QUESTIONS 1-9: 0
SUM OF ALL RESPONSES TO PHQ QUESTIONS 1-9: 0
5. POOR APPETITE OR OVEREATING: 0

## 2023-09-07 ENCOUNTER — PATIENT MESSAGE (OUTPATIENT)
Age: 33
End: 2023-09-07

## 2023-09-08 ENCOUNTER — NURSE ONLY (OUTPATIENT)
Age: 33
End: 2023-09-08

## 2023-09-08 DIAGNOSIS — E11.9 TYPE 2 DIABETES MELLITUS WITHOUT COMPLICATION, WITHOUT LONG-TERM CURRENT USE OF INSULIN (HCC): Primary | ICD-10-CM

## 2023-09-08 RX ORDER — ICOSAPENT ETHYL 1000 MG/1
1 CAPSULE ORAL 2 TIMES DAILY
Qty: 60 CAPSULE | Refills: 3 | Status: SHIPPED | OUTPATIENT
Start: 2023-09-08

## 2023-09-11 NOTE — PROGRESS NOTES
[facility] Program for Diabetes Health  Diabetes Self-Management Education & Support Program    Reason for Referral: newly diagnosed diabetes within the past year  Referral Source: Reba Blackwell MD  Services requested: DSMES       ASSESSMENT    From my perspective, the participant would benefit from [facility] specifically related to reducing risks, healthy eating, monitoring, taking medications, physical activity, healthy coping, and problem solving. Will adapt DSMES program to build on participant's skills score, confidence score, and preparedness score as noted in the Diabetes Skills, Confidence, and Preparedness Index. During the program, we will focus on providing DSMES that specifically addresses participant's interest in reducing risks, healthy eating, monitoring, taking medications, physical activity, healthy coping, and problem solving, as shown by their reported readiness to change. The participant would be best served by attending weekly individual sessions. Diabetes Self-Management Education Follow-up Visit: 9/13/23       Clinical Presentation  Jovita Sloan is a 34 y.o. White female referred for diabetes self-management education. Participant has Type 2 DM not on insulin for <1 year. Family history positive for diabetes. Patient reports not receiving DSMES services in the past. Most recent A1c value:   Lab Results   Component Value Date/Time    LABA1C 7.7 08/24/2023 12:00 AM     Diabetes-related medications:  Current dosing: metFORMIN - 500 MG  pioglitazone - 15 MG  Trulicity    Blood Pressure Management  This patient does not have an active medication from one of the medication groupers. Lipid Management  atorvastatin - 40 MG  fenofibrate - 145 MG  Icosapent Ethyl Caps - 1 g      Clot Prevention  This patient does not have an active medication from one of the medication groupers.     Learning Assessment  Learning objectives Educator assessment (9/11/2023)   Diabetes Disease Process  The

## 2023-09-13 ENCOUNTER — TELEMEDICINE (OUTPATIENT)
Age: 33
End: 2023-09-13

## 2023-09-13 ENCOUNTER — TELEPHONE (OUTPATIENT)
Age: 33
End: 2023-09-13

## 2023-09-13 DIAGNOSIS — E11.9 TYPE 2 DIABETES MELLITUS WITHOUT COMPLICATION, WITHOUT LONG-TERM CURRENT USE OF INSULIN (HCC): Primary | ICD-10-CM

## 2023-09-13 NOTE — TELEPHONE ENCOUNTER
----- Message from [address] [place] [address].  Michael Philip sent at 9/11/2023  9:19 AM EDT -----  Regarding: Fish oil   Contact: [phone]  Baldo August, the pharmacy said the fish oil requires prior authorization and that they sent the paperwork to your office

## 2023-09-15 NOTE — PROGRESS NOTES
at 11:58 AM    I have personally reviewed the health record, including provider notes, laboratory data and current medications before making these care and education recommendations. The time spent in this effort is included in the total time. Total minutes: 80    Kavin Maxwell, was evaluated through a synchronous (real-time) audio-video encounter. The patient (or guardian if applicable) is aware that this is a billable service, which includes applicable co-pays. This Virtual Visit was conducted with patient's (and/or legal guardian's) consent. Patient identification was verified, and a caregiver was present when appropriate. The patient was located at Other: at [facility] [place]  Provider was located at [facility] (Appt Dept): [address] [place] [address],  [address] [place] [address] Drive     --Maria Luisa Rene RD on 9/15/2023 at 1:35 PM    An electronic signature was used to authenticate this note.

## 2023-09-18 DIAGNOSIS — F51.01 PRIMARY INSOMNIA: ICD-10-CM

## 2023-09-18 RX ORDER — ZOLPIDEM TARTRATE 10 MG/1
10 TABLET ORAL NIGHTLY PRN
Qty: 90 TABLET | Refills: 0 | OUTPATIENT
Start: 2023-09-18 | End: 2023-12-17

## 2023-09-18 RX ORDER — OMEPRAZOLE 20 MG/1
20 CAPSULE, DELAYED RELEASE ORAL DAILY
Qty: 90 CAPSULE | Refills: 1 | Status: SHIPPED | OUTPATIENT
Start: 2023-09-18 | End: 2023-09-19 | Stop reason: SDUPTHER

## 2023-09-18 RX ORDER — OMEPRAZOLE 20 MG/1
CAPSULE, DELAYED RELEASE ORAL
Qty: 90 CAPSULE | Refills: 0 | Status: SHIPPED | OUTPATIENT
Start: 2023-09-18 | End: 2023-09-18 | Stop reason: SDUPTHER

## 2023-09-18 NOTE — TELEPHONE ENCOUNTER
zolpidem (AMBIEN) 10 MG tablet Zechariah Cortes, APRN - NP]     Preferred pharmacy: [facility] [address], [address] [facility] [address] [place] [address] [phone] [facility] [phone]        Medication renewals requested in this message routed separately:         Dulaglutide (TRULICITY) 3 ZT/0.0TY SOPN [Dr. Marissa Guevara MD]

## 2023-09-19 RX ORDER — OMEPRAZOLE 20 MG/1
20 CAPSULE, DELAYED RELEASE ORAL DAILY
Qty: 90 CAPSULE | Refills: 1 | Status: SHIPPED | OUTPATIENT
Start: 2023-09-19

## 2023-09-20 ENCOUNTER — TELEMEDICINE (OUTPATIENT)
Age: 33
End: 2023-09-20

## 2023-09-20 DIAGNOSIS — E11.9 TYPE 2 DIABETES MELLITUS WITHOUT COMPLICATION, WITHOUT LONG-TERM CURRENT USE OF INSULIN (HCC): Primary | ICD-10-CM

## 2023-09-20 RX ORDER — DULAGLUTIDE 3 MG/.5ML
3 INJECTION, SOLUTION SUBCUTANEOUS WEEKLY
Qty: 2 ML | Refills: 3 | Status: SHIPPED | OUTPATIENT
Start: 2023-09-20

## 2023-09-20 RX ORDER — PIOGLITAZONEHYDROCHLORIDE 15 MG/1
15 TABLET ORAL DAILY
Qty: 90 TABLET | Refills: 3 | Status: SHIPPED | OUTPATIENT
Start: 2023-09-20

## 2023-09-21 ENCOUNTER — PATIENT MESSAGE (OUTPATIENT)
Age: 33
End: 2023-09-21

## 2023-09-21 RX ORDER — ICOSAPENT ETHYL 1000 MG/1
2 CAPSULE ORAL 2 TIMES DAILY
Qty: 60 CAPSULE | Refills: 1 | Status: SHIPPED | OUTPATIENT
Start: 2023-09-21

## 2023-09-21 NOTE — PROGRESS NOTES
[facility] for Diabetes Health  Diabetes Self-Management Education & Support Program  Encounter Note      SUMMARY  Diabetes self-care management training was completed related to healthy eating and monitoring. he participant will return in 1 week to continue DSMES related to taking medications and physical activity. The participant did not identify SMART Goal(s) and will practice knowledge and skills related to healthy eating and monitoring to improve diabetes self-management. EVALUATION:  Riky Bourgeois actively participated in individual class. Participated in reading food labels and correctly identifying CHO content in different portion sizes of CHO, protein and fat. Discussed the importance of eating a healthy plate to help with BG control. Encouraged to download [employer] ejssi to help with CHO counting and tracking calories for possible weight loss. RECOMMENDATIONS:  Participant to work on [employer] goal. Call [facility] if education questions or concerns arise. Contact provider with any medical concerns. TOPICS DISCUSSED TODAY:  WHAT CAN I EAT? 60    Next provider visit is scheduled for   Future Appointments   Date Time Provider [address]   9/27/2023  2:30 PM Fernando Alvarez RD PDHT BS AMB   10/4/2023  4:00 PM BELLO Miranda NP HFPR MAIN BS AMB   12/21/2023  1:40 PM BELLO Alvarez NP BS AMB          DATE DSMES TOPIC EVALUATION     9/20/2023 WHAT CAN I EAT?    General principles   Determining a healthy weight   Nutritional terms & tools   Healthy Plate method   Carbohydrate Counting   Reading food labels   Free apps   Pregnancy recommendations      The participant   Uses Healthy Plate principles in constructing meals: Yes  Reads food labels in choosing acceptable foods: Yes         Fernando Alvarez RD on 9/21/2023 at 11:18 AM    I have personally reviewed the health record, including provider notes, laboratory data and current medications before making these care and

## 2023-09-26 PROBLEM — E11.9 TYPE 2 DIABETES MELLITUS WITHOUT COMPLICATION, WITHOUT LONG-TERM CURRENT USE OF INSULIN (HCC): Status: ACTIVE | Noted: 2023-09-26

## 2023-09-26 PROBLEM — K76.0 NAFLD (NONALCOHOLIC FATTY LIVER DISEASE): Status: ACTIVE | Noted: 2023-07-26

## 2023-09-26 PROBLEM — E66.01 MORBID OBESITY (HCC): Status: ACTIVE | Noted: 2023-09-26

## 2023-09-27 ENCOUNTER — TELEMEDICINE (OUTPATIENT)
Age: 33
End: 2023-09-27
Payer: MEDICAID

## 2023-09-27 DIAGNOSIS — E11.9 TYPE 2 DIABETES MELLITUS WITHOUT COMPLICATION, WITHOUT LONG-TERM CURRENT USE OF INSULIN (HCC): Primary | ICD-10-CM

## 2023-09-27 PROCEDURE — G0108 DIAB MANAGE TRN  PER INDIV: HCPCS | Performed by: DIETITIAN, REGISTERED

## 2023-09-27 NOTE — PROGRESS NOTES
[facility] Program for Diabetes Health  Diabetes Self-Management Education & Support Program  Encounter Note      SUMMARY  Diabetes self-care management training was completed related to monitoring. he participant will return in 1 week to continue DSMES related to taking medications and physical activity. The participant did identify SMART Goal(s) and will practice knowledge and skills related to healthy eating and monitoring to improve diabetes self-management. EVALUATION:  [facility] actively participated in individual session. Using the healthy plate technique. RECOMMENDATIONS:  Participant to work on [employer] goal. Call [address] [place] [address] if education questions or concerns arise. Contact provider with any medical concerns. TOPICS DISCUSSED TODAY:  HOW CAN BLOOD GLUCOSE MONITORING HELP ME? 60      Next provider visit is scheduled for   Future Appointments   Date Time Provider [address]   10/4/2023  4:00 PM BELLO Lpiscomb NP HFPR MAIN BS AMB   12/21/2023  1:40 PM BELLO Saunders - NP [facility] BS AMB          SMART GOAL(S)   To walk 5 minutes daily 2 days this week. ACHIEVEMENT OF GOAL(S) : 0-24%       DATE DSMES TOPIC EVALUATION     9/27/2023 HOW CAN BLOOD GLUCOSE MONITORING HELP ME? Value of blood glucose monitoring   Realistic expectations   Blood glucose monitoring targets   Target adjustments   Setting a1c & blood glucose targets with provider   Meter selection    Technique for obtaining blood droplet   Blood glucose testing sites   Determining best times to test   Pregnancy recommendations   Data sharing with provider        The participant   Can demonstrate their glucometer procedure: Yes  Logs their BG readings:  Yes         Julito Joya RD on 9/27/2023 at 2:32 PM    I have personally reviewed the health record, including provider notes, laboratory data and current medications before making these care and education recommendations.  The time spent in this effort is included in the

## 2023-09-28 RX ORDER — OMEGA-3-ACID ETHYL ESTERS 1 G/1
2 CAPSULE, LIQUID FILLED ORAL 2 TIMES DAILY
Qty: 60 CAPSULE | Refills: 3 | Status: SHIPPED | OUTPATIENT
Start: 2023-09-28

## 2023-10-01 ENCOUNTER — HOSPITAL ENCOUNTER (EMERGENCY)
Facility: HOSPITAL | Age: 33
Discharge: HOME | End: 2023-10-01
Attending: EMERGENCY MEDICINE
Payer: COMMERCIAL

## 2023-10-01 VITALS
TEMPERATURE: 98.3 F | WEIGHT: 240 LBS | RESPIRATION RATE: 17 BRPM | HEIGHT: 65 IN | DIASTOLIC BLOOD PRESSURE: 84 MMHG | HEART RATE: 98 BPM | SYSTOLIC BLOOD PRESSURE: 132 MMHG | BODY MASS INDEX: 39.99 KG/M2 | OXYGEN SATURATION: 98 %

## 2023-10-01 DIAGNOSIS — J06.9 ACUTE UPPER RESPIRATORY INFECTION: Primary | ICD-10-CM

## 2023-10-01 LAB — DEPRECATED S PYO AG THROAT QL EIA: NEGATIVE

## 2023-10-01 PROCEDURE — 99283 EMERGENCY DEPT VISIT LOW MDM: CPT

## 2023-10-01 PROCEDURE — 87070 CULTURE OTHR SPECIMN AEROBIC: CPT

## 2023-10-01 PROCEDURE — 87880 STREP A ASSAY W/OPTIC: CPT

## 2023-10-01 RX ORDER — BENZONATATE 100 MG/1
100 CAPSULE ORAL 2 TIMES DAILY PRN
Qty: 20 CAPSULE | Refills: 0 | Status: SHIPPED | OUTPATIENT
Start: 2023-10-01 | End: 2023-10-08

## 2023-10-01 ASSESSMENT — LIFESTYLE VARIABLES
HOW MANY STANDARD DRINKS CONTAINING ALCOHOL DO YOU HAVE ON A TYPICAL DAY: PATIENT DOES NOT DRINK
HOW OFTEN DO YOU HAVE A DRINK CONTAINING ALCOHOL: MONTHLY OR LESS

## 2023-10-01 ASSESSMENT — PAIN - FUNCTIONAL ASSESSMENT: PAIN_FUNCTIONAL_ASSESSMENT: NONE - DENIES PAIN

## 2023-10-01 NOTE — ED PROVIDER NOTES
EMERGENCY DEPARTMENT HISTORY AND PHYSICAL EXAM      Date: 10/1/2023  Patient Name: Early Leaf    History of Presenting Illness     Chief Complaint   Patient presents with    Sinusitis       History Provided By: Patient    HPI: Delgado Cisse, 34 y.o. female with PMHx s as noted below presents emergency department chief complaint of congestion, sore throat since this morning. Notes that her son has had similar symptoms over the last couple days. Pt denies any other alleviating or exacerbating factors. Additionally, pt specifically denies any recent fever, headache, nausea, vomiting, abdominal pain, CP, SOB, lightheadedness, dizziness, numbness, weakness, BLE swelling, heart palpitations, urinary sxs, diarrhea,     PCP: BELLO Pinzon NP    No current facility-administered medications for this encounter. Current Outpatient Medications   Medication Sig Dispense Refill    benzonatate (TESSALON) 100 MG capsule Take 1 capsule by mouth 2 times daily as needed for Cough 20 capsule 0    LOVAZA 1 g capsule Take 2 capsules by mouth 2 times daily 60 capsule 3    Icosapent Ethyl (VASCEPA) 1 g CAPS capsule Take 2 capsules by mouth 2 times daily 60 capsule 1    Dulaglutide (TRULICITY) 3 SW/6.9IO SOPN Inject 3 mg into the skin once a week 2 mL 3    pioglitazone (ACTOS) 15 MG tablet Take 1 tablet by mouth daily 90 tablet 3    omeprazole (PRILOSEC) 20 MG delayed release capsule Take 1 capsule by mouth daily 90 capsule 1    Icosapent Ethyl (VASCEPA) 1 g CAPS capsule Take 1 capsule by mouth 2 times daily 60 capsule 3    Continuous Blood Gluc Sensor (FREESTYLE SANDY 2 SENSOR) MISC Check glucose twice a day and as needed. DX E11.65 2 each 12    Continuous Blood Gluc  (FREESTYLE SANDY READER) RICH Check glucose twice a day and as needed.  DX E11.65 1 each 0    metFORMIN (GLUCOPHAGE) 500 MG tablet Take 1 tablet by mouth daily (with breakfast) 90 tablet 3    zolpidem (AMBIEN) 10 MG tablet Take 1 tablet by mouth

## 2023-10-02 LAB
BACTERIA SPEC CULT: NORMAL
SERVICE CMNT-IMP: NORMAL

## 2023-10-11 ENCOUNTER — TELEMEDICINE (OUTPATIENT)
Age: 33
End: 2023-10-11

## 2023-10-11 DIAGNOSIS — E11.9 TYPE 2 DIABETES MELLITUS WITHOUT COMPLICATION, WITHOUT LONG-TERM CURRENT USE OF INSULIN (HCC): Primary | ICD-10-CM

## 2023-10-11 NOTE — PROGRESS NOTES
[facility] Program for Diabetes Health  Diabetes Self-Management Education & Support Program  Encounter Note      SUMMARY  Diabetes self-care management training was completed related to eating healthy for heart health. The participant will return in 1 week to continue DSMES related to taking medications. The participant did identify SMART Goal(s) and will practice knowledge and skills related to eating healthy to improve diabetes self-management. EVALUATION:     Lady Graham actively participated in individual session. States she is walking 10-15 minutes daily at different times. Blood sugars are spiking over 200 mg/dl. Discussed easy ways to get exercise into their lifestyle. Has not been drinking sodas, reading food labels. States she has challenges staying within 45-60 grams CHO at her meals. Has elimated refined sugars, eating high fiber, and eating 1/2 her plate as veggies. Unsure if she is losing but states clothes are fitting the same. RECOMMENDATIONS:  Participant to work on [employer] goal listed below. Call [facility] if education questions or concerns arise. Contact provider with any medical concerns. Waiting on return call from provider regarding blood triglycerides and her medications. TOPICS DISCUSSED TODAY:  Eating healthy: 60 minutes    Next provider visit is scheduled for   Future Appointments   Date Time Provider [address]   10/18/2023  2:40 PM Paulina Garcia APRN - NP JOHN MAIN BS AMB   12/21/2023  1:40 PM Leo Curry, APRN - NP LIJERRICA BS AMB          SMART GOAL(S)   To walk 5 minutes daily 2 days this week. ACHIEVEMENT OF GOAL(S) : 75-60%         DATE DSMES TOPIC EVALUATION     10/11/2023 WHAT CAN I EAT?    General principles   Determining a healthy weight   Nutritional terms & tools   Healthy Plate method   Carbohydrate Counting   Reading food labels   Free apps   Healthy eating for elevated cholesterol      The participant   Uses Healthy Plate principles in

## 2023-10-13 RX ORDER — BLOOD-GLUCOSE METER
KIT MISCELLANEOUS
Qty: 1 KIT | Refills: 0 | Status: SHIPPED | OUTPATIENT
Start: 2023-10-13

## 2023-10-13 RX ORDER — DULAGLUTIDE 3 MG/.5ML
3 INJECTION, SOLUTION SUBCUTANEOUS WEEKLY
Qty: 2 ML | Refills: 3 | Status: SHIPPED | OUTPATIENT
Start: 2023-10-13

## 2023-10-15 ENCOUNTER — TELEPHONE (OUTPATIENT)
Age: 33
End: 2023-10-15

## 2023-10-15 RX ORDER — PIOGLITAZONEHYDROCHLORIDE 30 MG/1
30 TABLET ORAL DAILY
Qty: 30 TABLET | Refills: 3 | Status: SHIPPED | OUTPATIENT
Start: 2023-10-15 | End: 2023-11-06 | Stop reason: SDUPTHER

## 2023-10-16 ENCOUNTER — TELEPHONE (OUTPATIENT)
Age: 33
End: 2023-10-16

## 2023-10-16 DIAGNOSIS — E11.65 POORLY CONTROLLED DIABETES MELLITUS (HCC): ICD-10-CM

## 2023-10-16 DIAGNOSIS — E11.65 TYPE 2 DIABETES MELLITUS WITH HYPERGLYCEMIA, WITHOUT LONG-TERM CURRENT USE OF INSULIN (HCC): ICD-10-CM

## 2023-10-16 NOTE — TELEPHONE ENCOUNTER
Medication Refill Request    Mikel Ask is requesting a refill of the following medication(s):   Continuous Blood Gluc Sensor (FREESTYLE SANDY 2 SENSOR) [facility]  Please send refill to: [address], [address] [facility] [phone]  [address] [place] [address]  Phone: [phone] Fax: [phone]    Pt states what was filled recently is not what she is currently using.

## 2023-10-16 NOTE — TELEPHONE ENCOUNTER
Closed  PA Detail   Prior Authorization not required for patient/medication   Case ID: [id number]      Payer:  Auto [employer] Patient's Payer    [phone]    [facility] predicted that this prior auth will not be required. View History     Medication Being Authorized     Icosapent Ethyl (VASCEPA) 1 g CAPS capsule    Take 2 capsules by mouth 2 times daily    Dispense: 120 capsule Refills: 3     Start: 10/15/2023      Class: Normal      This order has been released to its destination.    To be filled at: [facility] [address] KRISTEN IBARRA [address], [address] [facility]

## 2023-10-18 ENCOUNTER — OFFICE VISIT (OUTPATIENT)
Age: 33
End: 2023-10-18
Payer: COMMERCIAL

## 2023-10-18 VITALS
RESPIRATION RATE: 18 BRPM | WEIGHT: 230 LBS | HEART RATE: 102 BPM | TEMPERATURE: 96.8 F | DIASTOLIC BLOOD PRESSURE: 80 MMHG | OXYGEN SATURATION: 98 % | SYSTOLIC BLOOD PRESSURE: 116 MMHG | HEIGHT: 60 IN | BODY MASS INDEX: 45.16 KG/M2

## 2023-10-18 DIAGNOSIS — E11.65 POORLY CONTROLLED DIABETES MELLITUS (HCC): ICD-10-CM

## 2023-10-18 DIAGNOSIS — K90.0 CELIAC DISEASE: ICD-10-CM

## 2023-10-18 DIAGNOSIS — E11.65 TYPE 2 DIABETES MELLITUS WITH HYPERGLYCEMIA, WITHOUT LONG-TERM CURRENT USE OF INSULIN (HCC): Primary | ICD-10-CM

## 2023-10-18 DIAGNOSIS — J01.00 ACUTE NON-RECURRENT MAXILLARY SINUSITIS: ICD-10-CM

## 2023-10-18 PROCEDURE — 99214 OFFICE O/P EST MOD 30 MIN: CPT | Performed by: NURSE PRACTITIONER

## 2023-10-18 PROCEDURE — 3051F HG A1C>EQUAL 7.0%<8.0%: CPT | Performed by: NURSE PRACTITIONER

## 2023-10-18 RX ORDER — AMOXICILLIN AND CLAVULANATE POTASSIUM 500; 125 MG/1; MG/1
1 TABLET, FILM COATED ORAL 3 TIMES DAILY
Qty: 30 TABLET | Refills: 0 | Status: SHIPPED | OUTPATIENT
Start: 2023-10-18 | End: 2023-10-28

## 2023-11-06 DIAGNOSIS — E11.65 TYPE 2 DIABETES MELLITUS WITH HYPERGLYCEMIA, WITHOUT LONG-TERM CURRENT USE OF INSULIN (HCC): ICD-10-CM

## 2023-11-06 DIAGNOSIS — E11.65 POORLY CONTROLLED DIABETES MELLITUS (HCC): ICD-10-CM

## 2023-11-06 RX ORDER — DULAGLUTIDE 3 MG/.5ML
3 INJECTION, SOLUTION SUBCUTANEOUS WEEKLY
Qty: 2 ML | Refills: 3 | Status: SHIPPED | OUTPATIENT
Start: 2023-11-06

## 2023-11-06 ASSESSMENT — SLEEP AND FATIGUE QUESTIONNAIRES
HOW LIKELY ARE YOU TO NOD OFF OR FALL ASLEEP WHILE SITTING INACTIVE IN A PUBLIC PLACE: 1
HOW LIKELY ARE YOU TO NOD OFF OR FALL ASLEEP WHILE WATCHING TV: WOULD NEVER DOZE
HOW LIKELY ARE YOU TO NOD OFF OR FALL ASLEEP WHEN YOU ARE A PASSENGER IN A CAR FOR AN HOUR WITHOUT A BREAK: 2
DO YOU GENERALLY HAVE DIFFICULTY REMEMBERING THINGS BECAUSE YOU ARE SLEEPY OR TIRED: NO
DO YOU HAVE DIFFICULTY BEING AS ACTIVE AS YOU WANT TO BE IN THE EVENING BECAUSE YOU ARE SLEEPY OR TIRED: YES, LITTLE
ESS TOTAL SCORE: 8
HOW LIKELY ARE YOU TO NOD OFF OR FALL ASLEEP IN A CAR, WHILE STOPPED FOR A FEW MINUTES IN TRAFFIC: WOULD NEVER DOZE
HAS YOUR MOOD BEEN AFFECTED BECAUSE YOU ARE SLEEPY OR TIRED: YES, LITTLE
HOW LIKELY ARE YOU TO NOD OFF OR FALL ASLEEP WHILE SITTING AND TALKING TO SOMEONE: 0
HOW LIKELY ARE YOU TO NOD OFF OR FALL ASLEEP WHILE SITTING QUIETLY AFTER LUNCH WITHOUT ALCOHOL: 1
HOW LIKELY ARE YOU TO NOD OFF OR FALL ASLEEP WHILE SITTING INACTIVE IN A PUBLIC PLACE: SLIGHT CHANCE OF DOZING
HOW LIKELY ARE YOU TO NOD OFF OR FALL ASLEEP WHEN YOU ARE A PASSENGER IN A CAR FOR AN HOUR WITHOUT A BREAK: MODERATE CHANCE OF DOZING
HAS YOUR RELATIONSHIP WITH FAMILY, FRIENDS OR WORK COLLEAGUES BEEN AFFECTED BECAUSE YOU ARE SLEEPY OR TIRED: NO
DO YOU HAVE DIFFICULTY VISITING YOUR FAMILY OR FRIENDS IN THEIR HOME BECAUSE YOU BECOME SLEEPY OR TIRED: NO
DO YOU HAVE DIFFICULTY BEING AS ACTIVE AS YOU WANT TO BE IN THE MORNING BECAUSE YOU ARE SLEEPY OR TIRED: YES, LITTLE
HOW LIKELY ARE YOU TO NOD OFF OR FALL ASLEEP WHILE LYING DOWN TO REST IN THE AFTERNOON WHEN CIRCUMSTANCES PERMIT: HIGH CHANCE OF DOZING
HOW LIKELY ARE YOU TO NOD OFF OR FALL ASLEEP WHILE WATCHING TV: 0
DO YOU HAVE DIFFICULTY CONCENTRATING ON THE THINGS YOU DO BECAUSE YOU ARE SLEEPY OR TIRED: NO
HOW LIKELY ARE YOU TO NOD OFF OR FALL ASLEEP WHILE SITTING AND READING: 1
FOSQ SCORE: 18.5
DO YOU HAVE DIFFICULTY OPERATING A MOTOR VEHICLE FOR LONG DISTANCES (GREATER THAN 100 MILES) BECAUSE YOU BECOME SLEEPY: NO
HOW LIKELY ARE YOU TO NOD OFF OR FALL ASLEEP WHILE SITTING AND TALKING TO SOMEONE: WOULD NEVER DOZE
HOW LIKELY ARE YOU TO NOD OFF OR FALL ASLEEP WHILE SITTING QUIETLY AFTER LUNCH WITHOUT ALCOHOL: SLIGHT CHANCE OF DOZING
HOW LIKELY ARE YOU TO NOD OFF OR FALL ASLEEP WHILE LYING DOWN TO REST IN THE AFTERNOON WHEN CIRCUMSTANCES PERMIT: 3
DO YOU HAVE DIFFICULTY OPERATING A MOTOR VEHICLE FOR SHORT DISTANCES (LESS THAN 100 MILES) BECAUSE YOU BECOME SLEEPY: NO
HOW LIKELY ARE YOU TO NOD OFF OR FALL ASLEEP IN A CAR, WHILE STOPPED FOR A FEW MINUTES IN TRAFFIC: 0
DO YOU HAVE DIFFICULTY WATCHING A MOVIE OR VIDEO BECAUSE YOU BECOME SLEEPY OR TIRED: NO
HOW LIKELY ARE YOU TO NOD OFF OR FALL ASLEEP WHILE SITTING AND READING: SLIGHT CHANCE OF DOZING

## 2023-11-08 ENCOUNTER — TELEPHONE (OUTPATIENT)
Age: 33
End: 2023-11-08

## 2023-11-08 ENCOUNTER — OFFICE VISIT (OUTPATIENT)
Age: 33
End: 2023-11-08
Payer: COMMERCIAL

## 2023-11-08 VITALS
OXYGEN SATURATION: 98 % | TEMPERATURE: 99.5 F | HEART RATE: 106 BPM | WEIGHT: 241 LBS | HEIGHT: 60 IN | BODY MASS INDEX: 47.32 KG/M2 | RESPIRATION RATE: 18 BRPM | SYSTOLIC BLOOD PRESSURE: 100 MMHG | DIASTOLIC BLOOD PRESSURE: 64 MMHG

## 2023-11-08 DIAGNOSIS — H66.012 NON-RECURRENT ACUTE SUPPURATIVE OTITIS MEDIA OF LEFT EAR WITH SPONTANEOUS RUPTURE OF TYMPANIC MEMBRANE: ICD-10-CM

## 2023-11-08 DIAGNOSIS — F41.1 GAD (GENERALIZED ANXIETY DISORDER): ICD-10-CM

## 2023-11-08 DIAGNOSIS — R68.89 FLU-LIKE SYMPTOMS: Primary | ICD-10-CM

## 2023-11-08 DIAGNOSIS — J02.9 SORE THROAT: ICD-10-CM

## 2023-11-08 LAB
EXP DATE SOLUTION: NORMAL
EXP DATE SWAB: NORMAL
EXPIRATION DATE: NORMAL
INFLUENZA A ANTIGEN, POC: NEGATIVE
INFLUENZA B ANTIGEN, POC: NEGATIVE
LOT NUMBER POC: NORMAL
LOT NUMBER SOLUTION: NORMAL
LOT NUMBER SWAB: NORMAL
RSV RNA: NEGATIVE
SARS-COV-2 RNA, POC: NEGATIVE
STREP PYOGENES DNA, POC: NEGATIVE
VALID INTERNAL CONTROL, POC: YES
VALID INTERNAL CONTROL, POC: YES

## 2023-11-08 PROCEDURE — 87651 STREP A DNA AMP PROBE: CPT | Performed by: NURSE PRACTITIONER

## 2023-11-08 PROCEDURE — 87635 SARS-COV-2 COVID-19 AMP PRB: CPT | Performed by: NURSE PRACTITIONER

## 2023-11-08 PROCEDURE — 87634 RSV DNA/RNA AMP PROBE: CPT | Performed by: NURSE PRACTITIONER

## 2023-11-08 PROCEDURE — 87502 INFLUENZA DNA AMP PROBE: CPT | Performed by: NURSE PRACTITIONER

## 2023-11-08 RX ORDER — ICOSAPENT ETHYL 1000 MG/1
2 CAPSULE ORAL 2 TIMES DAILY
Qty: 120 CAPSULE | Refills: 3 | Status: SHIPPED | OUTPATIENT
Start: 2023-11-08

## 2023-11-08 RX ORDER — AMOXICILLIN AND CLAVULANATE POTASSIUM 500; 125 MG/1; MG/1
1 TABLET, FILM COATED ORAL 3 TIMES DAILY
Qty: 30 TABLET | Refills: 0 | Status: SHIPPED | OUTPATIENT
Start: 2023-11-08 | End: 2023-11-18

## 2023-11-08 RX ORDER — PIOGLITAZONEHYDROCHLORIDE 30 MG/1
30 TABLET ORAL DAILY
Qty: 30 TABLET | Refills: 3 | Status: SHIPPED | OUTPATIENT
Start: 2023-11-08 | End: 2023-11-13 | Stop reason: SDUPTHER

## 2023-11-08 RX ORDER — AZITHROMYCIN 250 MG/1
250 TABLET, FILM COATED ORAL SEE ADMIN INSTRUCTIONS
Qty: 6 TABLET | Refills: 0 | Status: SHIPPED | OUTPATIENT
Start: 2023-11-08 | End: 2023-11-08 | Stop reason: CLARIF

## 2023-11-08 ASSESSMENT — COLUMBIA-SUICIDE SEVERITY RATING SCALE - C-SSRS
3. HAVE YOU BEEN THINKING ABOUT HOW YOU MIGHT KILL YOURSELF?: NO
7. DID THIS OCCUR IN THE LAST THREE MONTHS: NO
5. HAVE YOU STARTED TO WORK OUT OR WORKED OUT THE DETAILS OF HOW TO KILL YOURSELF? DO YOU INTEND TO CARRY OUT THIS PLAN?: NO
4. HAVE YOU HAD THESE THOUGHTS AND HAD SOME INTENTION OF ACTING ON THEM?: NO

## 2023-11-08 ASSESSMENT — PATIENT HEALTH QUESTIONNAIRE - PHQ9
4. FEELING TIRED OR HAVING LITTLE ENERGY: 0
6. FEELING BAD ABOUT YOURSELF - OR THAT YOU ARE A FAILURE OR HAVE LET YOURSELF OR YOUR FAMILY DOWN: 0
SUM OF ALL RESPONSES TO PHQ QUESTIONS 1-9: 0
8. MOVING OR SPEAKING SO SLOWLY THAT OTHER PEOPLE COULD HAVE NOTICED. OR THE OPPOSITE, BEING SO FIGETY OR RESTLESS THAT YOU HAVE BEEN MOVING AROUND A LOT MORE THAN USUAL: 0
10. IF YOU CHECKED OFF ANY PROBLEMS, HOW DIFFICULT HAVE THESE PROBLEMS MADE IT FOR YOU TO DO YOUR WORK, TAKE CARE OF THINGS AT HOME, OR GET ALONG WITH OTHER PEOPLE: 0
2. FEELING DOWN, DEPRESSED OR HOPELESS: 0
SUM OF ALL RESPONSES TO PHQ QUESTIONS 1-9: 0
9. THOUGHTS THAT YOU WOULD BE BETTER OFF DEAD, OR OF HURTING YOURSELF: 0
7. TROUBLE CONCENTRATING ON THINGS, SUCH AS READING THE NEWSPAPER OR WATCHING TELEVISION: 0
1. LITTLE INTEREST OR PLEASURE IN DOING THINGS: 0
3. TROUBLE FALLING OR STAYING ASLEEP: 0
SUM OF ALL RESPONSES TO PHQ QUESTIONS 1-9: 0
SUM OF ALL RESPONSES TO PHQ9 QUESTIONS 1 & 2: 0
SUM OF ALL RESPONSES TO PHQ QUESTIONS 1-9: 0
5. POOR APPETITE OR OVEREATING: 0

## 2023-11-08 NOTE — TELEPHONE ENCOUNTER
Pt had OV today and states that she saw on MyChart that her Flu, Covid and RSV tests came back negative but she would like to know if something can be prescribed because nothing she has taken OTC has been working. Please advise.

## 2023-11-08 NOTE — PROGRESS NOTES
Patient identified by two patient identifiers, name and date of birth. Spoke with patient. Patient aware of negative POC results and states understanding at this time. She is also aware of rx sent to pharmacy as well.

## 2023-11-08 NOTE — PROGRESS NOTES
Subjective:       Robert Lindo is a 34 y.o. female who presents for evaluation of sore throat. Associated symptoms include chills, dry cough, post nasal drip, sinus and nasal congestion, and sore throat. Onset of symptoms was 1 week ago, gradually worsening since that time. She is drinking plenty of fluids. She has not had recent close exposure to someone with proven streptococcal pharyngitis.   Past Medical History:   Diagnosis Date    ADD (attention deficit disorder)     Bronchial spasms     Celiac disease 05/2019    Celiac disease     Depression 5/9/2019    Diabetes mellitus ([address])     Fatty liver     Fracture, ankle 06/14/2020    right    GERD (gastroesophageal reflux disease)     Herpes simplex virus (HSV) infection     Hx of herpes simplex infection 05/13/2009    lips    Panic     Sleep apnea     USES cpap     Patient Active Problem List    Diagnosis Date Noted    Type 2 diabetes mellitus without complication, without long-term current use of insulin ([address]) 09/26/2023    Morbid obesity ([address]) 09/26/2023    Celiac disease 08/26/2023    Hypertriglyceridemia 08/26/2023    NAFLD (nonalcoholic fatty liver disease) 07/26/2023    Obstructive sleep apnea 01/20/2023    Insomnia disorder with non-sleep disorder mental comorbidity 10/03/2019    Recurrent major depressive disorder, in partial remission ([address]) 05/09/2019    SANDY (generalized anxiety disorder) 06/14/2018     Past Surgical History:   Procedure Laterality Date    LIVER BIOPSY N/A 8/9/2023    LIVER BIOPSY ULTRASOUND performed by Twila Feliz MD at [address]  05/2019    upper endoscopy with biopsy    OTHER SURGICAL HISTORY  07/09/2021    INGROWN TOENAIL SURGERY     Family History   Problem Relation Age of Onset    Hypertension Paternal Grandmother     Lung Disease Paternal Grandmother     Heart Disease Paternal Grandmother     Migraines Maternal Grandmother     Asthma Sister     Hypertension Paternal Grandfather

## 2023-11-09 ENCOUNTER — TELEMEDICINE (OUTPATIENT)
Age: 33
End: 2023-11-09
Payer: COMMERCIAL

## 2023-11-09 DIAGNOSIS — G47.33 OSA (OBSTRUCTIVE SLEEP APNEA): Primary | ICD-10-CM

## 2023-11-09 PROCEDURE — 99213 OFFICE O/P EST LOW 20 MIN: CPT | Performed by: NURSE PRACTITIONER

## 2023-11-09 NOTE — PROGRESS NOTES
[address] [facility] [place], [address]   Tel.  [phone]   Fax. [address] [facility], [address]   Tel.  [phone]   Fax. [phone]  [facility], [address] [facility]   Tel.  [phone]   Fax. [address] [facility] (: 1990) is a 29 y.o. female, established patient, seen for positive airway pressure follow-up, she was last seen by me on 2022, previously seen by Dr. Sylvie Blakely on 2022, prior notes reviewed in detail. Home sleep test 2022 showed AHI of 14/hr with a lowest  SpO2 of 84%. A subsequent titration study 2023 showed events responding to CPAP therapy. She is seen today for follow up. ASSESSMENT/PLAN:   Diagnosis Orders   1. PIPPA (obstructive sleep apnea)  DME Order for [facility]) as OP      2. BMI 45.0-49.9, adult ([address])          AHI = 14 (). On APAP :  5-7 cmH2O. Set up 10/2022    She is adherent with PAP therapy and PAP continues to benefit patient and remains necessary for control of her sleep apnea. No follow-up provider specified. Sleep Apnea -  continue on current pressures. Orders Placed This Encounter   Procedures    DME Order for (Specify) as OP     Diagnosis: (G47.33) PIPPA (obstructive sleep apnea)  (primary encounter diagnosis)     Replacement Supplies for Positive Airway Pressure Therapy Device:   Duration of need: 99 months.  Nasal Pillows Combo Mask (Replace) 2 per month.  Nasal Pillows (Replace) 2 per month.  Nasal Cushion (Replace) 2 per month.  Nasal Interface Mask 1 every 3 months.  Headgear 1 every 6 months.  Positive Airway Pressure chinstrap 1 every 6 months.  Tubing with heating element 1 every 3 months.  Filter(s) Disposable 2 per month.  Filter(s) Non-Disposable 1 every 6 months. .   [address]  for Humidifier (Replace) 1 every 6 months.     Zahira García [facility] NPI:

## 2023-11-09 NOTE — PATIENT INSTRUCTIONS
[address] [facility].[place], [address]  Tel.  [phone]  Fax. [address] [facility], [address] [place] [address]  Tel.  [phone]  Fax. [phone] [facility], [address] [facility]  Tel.  [phone]  Fax. [phone]     Learning About CPAP for Sleep Apnea  What is CPAP? CPAP is a small machine that you use at home every night while you sleep. It increases air pressure in your throat to keep your airway open. When you have sleep apnea, this can help you sleep better so you feel much better. CPAP stands for \"continuous positive airway pressure. \"  The CPAP machine will have one of the following:  A mask that covers your nose and mouth  Prongs that fit into your nose  A mask that covers your nose only, the most common type. This type is called NCPAP. The N stands for \"nasal.\"  Why is it done? CPAP is usually the best treatment for obstructive sleep apnea. It is the first treatment choice and the most widely used. Your doctor may suggest CPAP if you have: Moderate to severe sleep apnea. Sleep apnea and coronary artery disease (CAD) or heart failure. How does it help? CPAP can help you have more normal sleep, so you feel less sleepy and more alert during the daytime. CPAP may help keep heart failure or other heart problems from getting worse. NCPAP may help lower your blood pressure. If you use CPAP, your bed partner may also sleep better because you are not snoring or restless. What are the side effects? Some people who use CPAP have:  A dry or stuffy nose and a sore throat. Irritated skin on the face. Sore eyes. Bloating. If you have any of these problems, work with your doctor to fix them. Here are some things you can try:  Be sure the mask or nasal prongs fit well. See if your doctor can adjust the pressure of your CPAP. If your nose is dry, try a humidifier.   If your nose is runny or stuffy, try decongestant medicine or a steroid

## 2023-11-10 ENCOUNTER — CLINICAL DOCUMENTATION (OUTPATIENT)
Age: 33
End: 2023-11-10

## 2023-11-10 ENCOUNTER — TELEPHONE (OUTPATIENT)
Age: 33
End: 2023-11-10

## 2023-11-10 NOTE — TELEPHONE ENCOUNTER
PA for Vascepa was sent to Dr. Gricelda Carrillo by way of [facility] for completion.

## 2023-11-10 NOTE — TELEPHONE ENCOUNTER
LVM to notify the patient that we have scheduled a yearly follow up  vv. Appointment is with Isak Cee on 11/12/24 at 3:30. Return call requested if this doesn't work for patients schedule.

## 2023-11-13 RX ORDER — PIOGLITAZONEHYDROCHLORIDE 30 MG/1
30 TABLET ORAL DAILY
Qty: 90 TABLET | Refills: 3 | Status: SHIPPED | OUTPATIENT
Start: 2023-11-13

## 2023-11-13 RX ORDER — DULAGLUTIDE 3 MG/.5ML
3 INJECTION, SOLUTION SUBCUTANEOUS WEEKLY
Qty: 2 ML | Refills: 3 | Status: SHIPPED | OUTPATIENT
Start: 2023-11-13 | End: 2023-12-13 | Stop reason: SDUPTHER

## 2023-11-14 DIAGNOSIS — F51.01 PRIMARY INSOMNIA: ICD-10-CM

## 2023-11-14 RX ORDER — ZOLPIDEM TARTRATE 10 MG/1
10 TABLET ORAL NIGHTLY PRN
Qty: 90 TABLET | Refills: 0 | Status: SHIPPED | OUTPATIENT
Start: 2023-11-14 | End: 2024-02-12

## 2023-11-15 LAB — DRUGS UR: NORMAL

## 2023-11-20 ENCOUNTER — TELEPHONE (OUTPATIENT)
Age: 33
End: 2023-11-20

## 2023-11-20 NOTE — TELEPHONE ENCOUNTER
Pt is wanting to know what is going with prior auths for CYMBALTA 30 MG extended release capsule, Continuous Blood Gluc  (FREESTYLE SANDY READER) RICH, and Dulaglutide (TRULICITY) 3 KO/4.7QX SOPN. Please advise.

## 2023-11-20 NOTE — TELEPHONE ENCOUNTER
S/w patient told her we are working o, never got a PA request for Cymbalta so she will call pharmacy to have them resend this,FYI

## 2023-11-21 ENCOUNTER — TELEPHONE (OUTPATIENT)
Age: 33
End: 2023-11-21

## 2023-11-21 NOTE — TELEPHONE ENCOUNTER
Cristiane Amos says her BS has been really low the last couple of nights and again today and this is after eating. Last night it was 54 and at 1:50 pm today it was 62. Please advise what she can do.

## 2023-11-27 ENCOUNTER — TELEPHONE (OUTPATIENT)
Age: 33
End: 2023-11-27

## 2023-11-27 RX ORDER — DULOXETIN HYDROCHLORIDE 30 MG/1
30 CAPSULE, DELAYED RELEASE ORAL DAILY
Qty: 90 CAPSULE | Refills: 3 | Status: SHIPPED | OUTPATIENT
Start: 2023-11-27

## 2023-11-27 NOTE — TELEPHONE ENCOUNTER
Patient informed will try the duloxetine again Kana Morton fair minus using bed and RW for support/fair minus

## 2023-12-13 RX ORDER — DULAGLUTIDE 3 MG/.5ML
3 INJECTION, SOLUTION SUBCUTANEOUS WEEKLY
Qty: 2 ML | Refills: 5 | Status: SHIPPED | OUTPATIENT
Start: 2023-12-13

## 2023-12-13 NOTE — TELEPHONE ENCOUNTER
Oumar Goodman wants to know if Eusebio Banegas needs to up the dosage on her Trulicity since she has been on it a while. Also needs a refill. Medication Refill Request    Darryleryle Greaser is requesting a refill of the following medication(s):   Dulaglutide (TRULICITY) 3 TH/5.7XJ SOPN     Please send refill to:       [address] [place] [address] [address], [address]  [address] [place] [address]  Phone: [phone] Fax: [phone]

## 2023-12-14 DIAGNOSIS — F51.01 PRIMARY INSOMNIA: ICD-10-CM

## 2023-12-14 RX ORDER — ATORVASTATIN CALCIUM 40 MG/1
40 TABLET, FILM COATED ORAL DAILY
Qty: 90 TABLET | Refills: 3 | Status: SHIPPED | OUTPATIENT
Start: 2023-12-14

## 2023-12-15 ENCOUNTER — TELEPHONE (OUTPATIENT)
Age: 33
End: 2023-12-15

## 2023-12-15 DIAGNOSIS — E11.65 TYPE 2 DIABETES MELLITUS WITH HYPERGLYCEMIA, WITHOUT LONG-TERM CURRENT USE OF INSULIN (HCC): Primary | ICD-10-CM

## 2023-12-15 RX ORDER — OMEPRAZOLE 20 MG/1
20 CAPSULE, DELAYED RELEASE ORAL DAILY
Qty: 90 CAPSULE | Refills: 1 | Status: SHIPPED | OUTPATIENT
Start: 2023-12-15

## 2023-12-15 RX ORDER — ZOLPIDEM TARTRATE 10 MG/1
10 TABLET ORAL NIGHTLY PRN
Qty: 30 TABLET | Refills: 0 | Status: SHIPPED | OUTPATIENT
Start: 2023-12-15 | End: 2024-03-14

## 2023-12-15 RX ORDER — FENOFIBRATE 145 MG/1
145 TABLET, COATED ORAL DAILY
Qty: 90 TABLET | Refills: 1 | Status: SHIPPED | OUTPATIENT
Start: 2023-12-15

## 2024-01-02 NOTE — TELEPHONE ENCOUNTER
From: Carrie Aguilera  To: Office of Marley Mathur  Sent: 1/2/2024 11:02 AM EST  Subject: Medication Renewal Request    Refills have been requested for the following medications:     Dulaglutide (TRULICITY) 3 MG/0.5ML SOPN [Marley Mathur, APRN - NP]   Patient Comment: [place] insurance should be in effect will probably need prior auth    [address] - P [phone] - F [phone]

## 2024-01-02 NOTE — TELEPHONE ENCOUNTER
Patient requesting refill on     Requested Prescriptions     Pending Prescriptions Disp Refills    Dulaglutide (TRULICITY) 3 MG/0.5ML SOPN 2 mL 5     Sig: Inject 3 mg into the skin once a week        Last OV 11/8/2023

## 2024-01-03 RX ORDER — DULAGLUTIDE 3 MG/.5ML
3 INJECTION, SOLUTION SUBCUTANEOUS WEEKLY
Qty: 2 ML | Refills: 5 | Status: SHIPPED | OUTPATIENT
Start: 2024-01-03

## 2024-01-05 NOTE — MR AVS SNAPSHOT
[address] 
[phone] Patient: Francesca Quinn MRN: [id number] Visit Information Date & Time Provider Department Dept. Phone Encounter #  
 1/16/2018  2:00 PM Shantell Werner NP [facility] [address] [facility] [phone] [id number] Upcoming Health Maintenance Date Due  
 HPV AGE 9Y-34Y (1 of 3 - Female 3 Dose Series) 6/3/2003 DTaP/Tdap/Td series (1 - Tdap) 6/3/2013 PAP AKA CERVICAL CYTOLOGY 6/3/2013 Influenza Age 5 to Adult 8/1/2017 Allergies as of 1/16/2018  Review Complete On: 1/16/2018 By: Yuriy Fisher RN Severity Noted Reaction Type Reactions Pcn [Penicillins]  09/16/2013    Other (comments) Family hx of allergies to PCN. Current Immunizations  Never Reviewed No immunizations on file. Not reviewed this visit You Were Diagnosed With   
  
 Codes Comments Pain of toe of right foot    -  Primary ICD-10-CM: V91.427 ICD-9-CM: 729.5 BMI 23.0-23.9, adult     ICD-10-CM: Z68.23 
ICD-9-CM: V85.1 Neck pain     ICD-10-CM: M54.2 ICD-9-CM: 723.1 Vitals BP Pulse Temp Resp Height(growth percentile) 100/70 (BP 1 Location: Left arm, BP Patient Position: Sitting) (!) 102 98.4 °F (36.9 °C) (Temporal) 20 5' 4\" (1.626 m) Weight(growth percentile) SpO2 BMI OB Status Smoking Status 136 lb (61.7 kg) 99% 23.34 kg/m2 Having regular periods Former Smoker Vitals History BMI and BSA Data Body Mass Index Body Surface Area  
 23.34 kg/m 2 1.67 m 2 Preferred Pharmacy Pharmacy Name Phone [address], [address] [place] [address] Megan Ashlee Cruz [phone] Your Updated Medication List  
  
   
This list is accurate as of: 1/16/18  2:21 PM.  Always use your most recent med list.  
  
  
  
  
 albuterol 90 mcg/actuation inhaler Commonly known as:  PROVENTIL HFA, VENTOLIN HFA, PROAIR HFA Take 1 Puff by inhalation every six (6) hours as needed for Wheezing. Indications: BRONCHOSPASM PREVENTION  
  
 azithromycin 250 mg tablet Commonly known as:  Fernando Morataya Take 2 tablets today, then take 1 tablet daily  Indications: SKIN AND SKIN STRUCTURE STREP AGALACTIAE INFECTION  
  
 COLACE 100 mg capsule Generic drug:  docusate sodium Take 100 mg by mouth. 1-2/day OTHER Birth control pills  
  
 triamcinolone acetonide 0.5 % ointment Commonly known as:  KENALOG Apply  to affected area two (2) times a day. use thin layer VALTREX 500 mg tablet Generic drug:  valACYclovir Take  by mouth two (2) times a day. Prescriptions Sent to Pharmacy Refills  
 azithromycin (ZITHROMAX) 250 mg tablet 0 Sig: Take 2 tablets today, then take 1 tablet daily  Indications: SKIN AND SKIN STRUCTURE STREP AGALACTIAE INFECTION Class: Normal  
 Pharmacy: [address], [address] [place] [address] Jim Clark Ph #: [phone] Introducing [facility]! Ashanti Castanon introduces H5 patient portal. Now you can access parts of your medical record, email your doctor's office, and request medication refills online. 1. In your internet browser, go to [email or web address] 2. Click on the First Time User? Click Here link in the Sign In box. You will see the New Member Sign Up page. 3. Enter your H5 Access Code exactly as it appears below. You will not need to use this code after youve completed the sign-up process. If you do not sign up before the expiration date, you must request a new code. · H5 Access Code: [email or web address] Expires: 2/15/2018  8:14 AM 
 
4. Enter the last four digits of your Social Security Number (xxxx) and Date of Birth (mm/dd/yyyy) as indicated and click Submit. You will be taken to the next sign-up page. 5. Create a LOFTY ID. This will be your LOFTY login ID and cannot be changed, so think of one that is secure and easy to remember. 6. Create a LOFTY password. You can change your password at any time. 7. Enter your Password Reset Question and Answer. This can be used at a later time if you forget your password. 8. Enter your e-mail address. You will receive e-mail notification when new information is available in [address] 9. Click Sign Up. You can now view and download portions of your medical record. 10. Click the Download Summary menu link to download a portable copy of your medical information. If you have questions, please visit the Frequently Asked Questions section of the LOFTY website. Remember, LOFTY is NOT to be used for urgent needs. For medical emergencies, dial 911. Now available from your iPhone and Android! Please provide this summary of care documentation to your next provider. Your primary care clinician is listed as Edison Johnston. If you have any questions after today's visit, please call [phone]. Fair

## 2024-01-14 RX ORDER — ATORVASTATIN CALCIUM 40 MG/1
40 TABLET, FILM COATED ORAL DAILY
Qty: 90 TABLET | Refills: 3 | Status: CANCELLED | OUTPATIENT
Start: 2024-01-14

## 2024-01-15 RX ORDER — OMEPRAZOLE 20 MG/1
20 CAPSULE, DELAYED RELEASE ORAL DAILY
Qty: 90 CAPSULE | Refills: 1 | Status: SHIPPED | OUTPATIENT
Start: 2024-01-15 | End: 2024-01-19 | Stop reason: SDUPTHER

## 2024-01-15 NOTE — TELEPHONE ENCOUNTER
Patient requesting refill on     Requested Prescriptions     Pending Prescriptions Disp Refills    omeprazole (PRILOSEC) 20 MG delayed release capsule 90 capsule 1     Sig: Take 1 capsule by mouth daily        Last OV Visit date not found

## 2024-01-15 NOTE — TELEPHONE ENCOUNTER
From: Carrie Aguilera  To: Office of Marley Mathur  Sent: 1/14/2024 8:14 PM EST  Subject: Medication Renewal Request    Refills have been requested for the following medications:     omeprazole (PRILOSEC) 20 MG delayed release capsule [Marley Mathur, APRN - NP]    [address] - P [phone] - F [phone]      Medication renewals requested in this message routed separately:     atorvastatin (LIPITOR) 40 MG tablet [Dr. Tish Palomo MD]

## 2024-01-16 ENCOUNTER — PATIENT MESSAGE (OUTPATIENT)
Age: 34
End: 2024-01-16

## 2024-01-16 RX ORDER — ATORVASTATIN CALCIUM 40 MG/1
40 TABLET, FILM COATED ORAL DAILY
Qty: 90 TABLET | Refills: 3 | Status: SHIPPED | OUTPATIENT
Start: 2024-01-16

## 2024-01-16 NOTE — TELEPHONE ENCOUNTER
Patient requesting refill on     Requested Prescriptions     Pending Prescriptions Disp Refills    atorvastatin (LIPITOR) 40 MG tablet 90 tablet 3     Sig: Take 1 tablet by mouth daily        Last OV Visit 11/08/2023,  10/18/2023.

## 2024-01-16 NOTE — TELEPHONE ENCOUNTER
From: Carrie Aguilera  To: Marley Teran  Sent: 1/16/2024 12:45 PM EST  Subject: Atorvastatin    Hello Dr teran, I thought we had switched all my prescriptions over to you so I no longer had to ask Dr jameson for refills but it seems that this one is still being sent to her office to have refills processed, is there any way you could write a prescription for this so I no longer have to contact her for refills?    Thank you

## 2024-01-18 DIAGNOSIS — E11.65 POORLY CONTROLLED DIABETES MELLITUS (HCC): ICD-10-CM

## 2024-01-18 DIAGNOSIS — E11.65 TYPE 2 DIABETES MELLITUS WITH HYPERGLYCEMIA, WITHOUT LONG-TERM CURRENT USE OF INSULIN (HCC): ICD-10-CM

## 2024-01-19 ENCOUNTER — OFFICE VISIT (OUTPATIENT)
Age: 34
End: 2024-01-19
Payer: MEDICAID

## 2024-01-19 VITALS
DIASTOLIC BLOOD PRESSURE: 70 MMHG | HEIGHT: 60 IN | SYSTOLIC BLOOD PRESSURE: 100 MMHG | WEIGHT: 243.6 LBS | RESPIRATION RATE: 20 BRPM | BODY MASS INDEX: 47.83 KG/M2 | OXYGEN SATURATION: 100 % | TEMPERATURE: 98.2 F | HEART RATE: 99 BPM

## 2024-01-19 DIAGNOSIS — U07.1 COVID: Primary | ICD-10-CM

## 2024-01-19 DIAGNOSIS — R05.1 ACUTE COUGH: ICD-10-CM

## 2024-01-19 DIAGNOSIS — F51.01 PRIMARY INSOMNIA: ICD-10-CM

## 2024-01-19 LAB
EXP DATE SOLUTION: ABNORMAL
EXP DATE SWAB: ABNORMAL
EXPIRATION DATE: ABNORMAL
LOT NUMBER POC: ABNORMAL
LOT NUMBER SOLUTION: ABNORMAL
LOT NUMBER SWAB: ABNORMAL
RSV ANTIGEN: NEGATIVE
SARS-COV-2 RNA, POC: POSITIVE

## 2024-01-19 PROCEDURE — 99214 OFFICE O/P EST MOD 30 MIN: CPT | Performed by: NURSE PRACTITIONER

## 2024-01-19 PROCEDURE — 86756 RESPIRATORY VIRUS ANTIBODY: CPT | Performed by: NURSE PRACTITIONER

## 2024-01-19 PROCEDURE — 87635 SARS-COV-2 COVID-19 AMP PRB: CPT | Performed by: NURSE PRACTITIONER

## 2024-01-19 NOTE — TELEPHONE ENCOUNTER
Patient requesting refill on     Requested Prescriptions     Pending Prescriptions Disp Refills    zolpidem (AMBIEN) 10 MG tablet 30 tablet 0     Sig: Take 1 tablet by mouth nightly as needed for Sleep for up to 90 days. Max Daily Amount: 10 mg    Dulaglutide (TRULICITY) 3 MG/0.5ML SOPN 2 mL 5     Sig: Inject 3 mg into the skin once a week    omeprazole (PRILOSEC) 20 MG delayed release capsule 90 capsule 1     Sig: Take 1 capsule by mouth daily        Last OV Visit

## 2024-01-19 NOTE — PROGRESS NOTES
Chief Complaint   Patient presents with    Fever       ASSESSMENT AND PLAN:      Diagnosis Orders   1. COVID  nirmatrelvir/ritonavir 300/100 (PAXLOVID) 20 x 150 MG & 10 x 100MG TBPK        Discussed dx with the patient. Rx Paxlovid. Reviewed MOA. Discussed infection control guidelines. Continues Tylenol for aches and fever, Mucinex PRN.     Patient aware of plan of care and verbalized understanding. Questions answered. RTC PRN.    HPI:     is a 33 y.o. female in today for cough and congestion. Symptoms started 2 days ago. She also notes throat irritation. Family members with RSV. Max temp 103.0.     No Known Allergies    Prior to Visit Medications    Medication Sig Taking? Authorizing Provider   nirmatrelvir/ritonavir 300/100 (PAXLOVID) 20 x 150 MG & 10 x 100MG TBPK Take 3 tablets (two 150 mg nirmatrelvir and one 100 mg ritonavir tablets) by mouth every 12 hours for 5 days. Yes Sylvia Manuel APRN - NP   Continuous Blood Gluc Sensor (FREESTYLE SANDY 2 SENSOR) [facility] Check glucose twice a day and as needed. DX E11.65 Yes Marley Mathur APRN - NP   atorvastatin (LIPITOR) 40 MG tablet Take 1 tablet by mouth daily Yes Marley Mathur APRN - NP   omeprazole (PRILOSEC) 20 MG delayed release capsule Take 1 capsule by mouth daily Yes Marley Mathur APRN - NP   metFORMIN (GLUCOPHAGE) 500 MG tablet Take 1 tablet by mouth daily (with breakfast) Yes Marley Mathur APRN - NP   Dulaglutide (TRULICITY) 3 MG/0.5ML SOPN Inject 3 mg into the skin once a week Yes Marley Mathur APRN - NP   levocetirizine (XYZAL) 5 MG tablet Take 1 tablet by mouth daily Yes Marley Mathur APRN - NP   fenofibrate (TRICOR) 145 MG tablet Take 1 tablet by mouth daily Yes Marley Mathur APRN - NP   zolpidem (AMBIEN) 10 MG tablet Take 1 tablet by mouth nightly as needed for Sleep for up to 90 days. Max Daily Amount: 10 mg Yes Marley Mathur APRN - NP   DULoxetine (CYMBALTA) 30 MG extended release capsule Take 1 capsule by mouth daily

## 2024-01-19 NOTE — PROGRESS NOTES
\"Have you been to the ER, urgent care clinic since your last visit?  Hospitalized since your last visit?\"    NO    “Have you seen or consulted any other health care providers outside of [facility] since your last visit?”    NO

## 2024-01-21 RX ORDER — OMEPRAZOLE 20 MG/1
20 CAPSULE, DELAYED RELEASE ORAL DAILY
Qty: 90 CAPSULE | Refills: 1 | Status: SHIPPED | OUTPATIENT
Start: 2024-01-21 | End: 2024-01-22 | Stop reason: SDUPTHER

## 2024-01-21 RX ORDER — ZOLPIDEM TARTRATE 10 MG/1
10 TABLET ORAL NIGHTLY PRN
Qty: 30 TABLET | Refills: 2 | Status: SHIPPED | OUTPATIENT
Start: 2024-01-21 | End: 2024-04-20

## 2024-01-21 RX ORDER — DULAGLUTIDE 3 MG/.5ML
3 INJECTION, SOLUTION SUBCUTANEOUS WEEKLY
Qty: 2 ML | Refills: 5 | Status: SHIPPED | OUTPATIENT
Start: 2024-01-21 | End: 2024-01-22 | Stop reason: DRUGHIGH

## 2024-01-22 ENCOUNTER — TELEPHONE (OUTPATIENT)
Age: 34
End: 2024-01-22

## 2024-01-22 RX ORDER — OMEPRAZOLE 20 MG/1
20 CAPSULE, DELAYED RELEASE ORAL DAILY
Qty: 30 CAPSULE | Refills: 2 | Status: SHIPPED | OUTPATIENT
Start: 2024-01-22

## 2024-01-22 NOTE — TELEPHONE ENCOUNTER
S/w patient needs you to reorder omeprazole for 30 days. She also wants increased dose of Trulicity she is not losing the weight on the present dose

## 2024-01-31 ENCOUNTER — OFFICE VISIT (OUTPATIENT)
Age: 34
End: 2024-01-31
Payer: MEDICAID

## 2024-01-31 VITALS
RESPIRATION RATE: 20 BRPM | TEMPERATURE: 98 F | OXYGEN SATURATION: 99 % | BODY MASS INDEX: 48.1 KG/M2 | DIASTOLIC BLOOD PRESSURE: 66 MMHG | SYSTOLIC BLOOD PRESSURE: 110 MMHG | WEIGHT: 245 LBS | HEIGHT: 60 IN | HEART RATE: 99 BPM

## 2024-01-31 DIAGNOSIS — E11.65 TYPE 2 DIABETES MELLITUS WITH HYPERGLYCEMIA, WITHOUT LONG-TERM CURRENT USE OF INSULIN (HCC): ICD-10-CM

## 2024-01-31 DIAGNOSIS — E11.65 POORLY CONTROLLED DIABETES MELLITUS (HCC): ICD-10-CM

## 2024-01-31 DIAGNOSIS — H66.002 NON-RECURRENT ACUTE SUPPURATIVE OTITIS MEDIA OF LEFT EAR WITHOUT SPONTANEOUS RUPTURE OF TYMPANIC MEMBRANE: Primary | ICD-10-CM

## 2024-01-31 PROCEDURE — 99214 OFFICE O/P EST MOD 30 MIN: CPT | Performed by: NURSE PRACTITIONER

## 2024-01-31 RX ORDER — AMOXICILLIN AND CLAVULANATE POTASSIUM 500; 125 MG/1; MG/1
1 TABLET, FILM COATED ORAL 3 TIMES DAILY
Qty: 30 TABLET | Refills: 0 | Status: SHIPPED | OUTPATIENT
Start: 2024-01-31 | End: 2024-02-10

## 2024-01-31 ASSESSMENT — PATIENT HEALTH QUESTIONNAIRE - PHQ9
5. POOR APPETITE OR OVEREATING: 0
1. LITTLE INTEREST OR PLEASURE IN DOING THINGS: 0
3. TROUBLE FALLING OR STAYING ASLEEP: 0
2. FEELING DOWN, DEPRESSED OR HOPELESS: 0
8. MOVING OR SPEAKING SO SLOWLY THAT OTHER PEOPLE COULD HAVE NOTICED. OR THE OPPOSITE, BEING SO FIGETY OR RESTLESS THAT YOU HAVE BEEN MOVING AROUND A LOT MORE THAN USUAL: 0
SUM OF ALL RESPONSES TO PHQ QUESTIONS 1-9: 0
SUM OF ALL RESPONSES TO PHQ9 QUESTIONS 1 & 2: 0
7. TROUBLE CONCENTRATING ON THINGS, SUCH AS READING THE NEWSPAPER OR WATCHING TELEVISION: 0
SUM OF ALL RESPONSES TO PHQ QUESTIONS 1-9: 0
6. FEELING BAD ABOUT YOURSELF - OR THAT YOU ARE A FAILURE OR HAVE LET YOURSELF OR YOUR FAMILY DOWN: 0
4. FEELING TIRED OR HAVING LITTLE ENERGY: 0
10. IF YOU CHECKED OFF ANY PROBLEMS, HOW DIFFICULT HAVE THESE PROBLEMS MADE IT FOR YOU TO DO YOUR WORK, TAKE CARE OF THINGS AT HOME, OR GET ALONG WITH OTHER PEOPLE: 0
9. THOUGHTS THAT YOU WOULD BE BETTER OFF DEAD, OR OF HURTING YOURSELF: 0

## 2024-02-02 NOTE — PROGRESS NOTES
\"Have you been to the ER, urgent care clinic since your last visit?  Hospitalized since your last visit?\"    NO    “Have you seen or consulted any other health care providers outside of [facility] since your last visit?”    NONo chief complaint on file.      Vitals:    01/31/24 1543   BP: 110/66   Pulse: 99   Resp: 20   Temp: 98 °F (36.7 °C)   SpO2: 99%            
full range of motion no thyromegaly. Trachea midline, No carotid bruits. No significant lymphadenopathy  Lungs:: clear to auscultation without wheezes, rales, or rhonchi.  Heart :RRR, S1 & S2 are normal intensity. No murmur; no gallop  Abdomen: bowel sounds active. No tenderness, guarding, rebound, masses, hepatic or spleen enlargement  Back: no CVA tenderness.  Extremities: without clubbing, cyanosis, or edema  Pulses: radial and femoral pulses are normal  Neuro: HMF intact. Cranial nerves II through XII grossly normal.  Motor: is 5 over 5 and symmetrical.   Deep tendon reflexes: +2 equal  Psych:appropriate behavior, mood, affect and judgement.   No results found for this visit on 01/31/24.    Lab Results   Component Value Date    WBC 10.1 12/20/2023    HGB 12.6 12/20/2023    HCT 41.5 12/20/2023     (H) 12/20/2023    CHOL 156 12/20/2023    TRIG 236 (H) 12/20/2023    HDL 20 12/20/2023     (H) 12/20/2023    AST 82 (H) 12/20/2023     12/20/2023    K 4.3 12/20/2023     12/20/2023    CREATININE 0.88 12/20/2023    BUN 13 12/20/2023    CO2 26 12/20/2023    TSH 1.550 03/01/2023    LABA1C 7.7 (H) 08/24/2023        Assessment/ Plan:     1. Type 2 diabetes mellitus with hyperglycemia, without long-term current use of insulin (HCC)    - Continuous Blood Gluc Sensor (FREESTYLE SANDY 2 SENSOR) MISC; Check glucose twice a day and as needed. DX E11.65  Dispense: 2 each; Refill: 12    2. Poorly controlled diabetes mellitus (HCC)    - Continuous Blood Gluc Sensor (FREESTYLE SANDY 2 SENSOR) MISC; Check glucose twice a day and as needed. DX E11.65  Dispense: 2 each; Refill: 12    3. Non-recurrent acute suppurative otitis media of left ear without spontaneous rupture of tympanic membrane  Orders Placed This Encounter    Continuous Blood Gluc Sensor (FREESTYLE SANDY 2 SENSOR) MISC     Sig: Check glucose twice a day and as needed. DX E11.65     Dispense:  2 each     Refill:  12     FreeStyle Sandy 2 sensor

## 2024-02-05 ENCOUNTER — TELEPHONE (OUTPATIENT)
Age: 34
End: 2024-02-05

## 2024-02-05 NOTE — TELEPHONE ENCOUNTER
Left message for patient to return call.   Re: Peer to peer for Freestyle Karl 2 Sensor - Patient may be seeing endocrinology in the near future. The endocrinologist may have better results of getting the sensor approved. Questioning if patient would like to wait or complete peer to peer now?

## 2024-02-05 NOTE — TELEPHONE ENCOUNTER
[facility] denied Freestyle Karl 2 Sensor due to patient not requiring insulin or insulin pump to maintain blood sugar. Patient has upcoming appointment with endocrinology. Explained to patient we have two options (1) NP Kevan can request a peer to peer authorization or the endocrinology can order the sensor. Explained the endocrinologist may have a better outcome of Freestyle Sensor being approved due to specialist may be able to obtain more clinical information and prove sensor being a medical necessity. Patient agreed to wait and discuss with the endocrinologist.

## 2024-02-06 RX ORDER — ATORVASTATIN CALCIUM 40 MG/1
40 TABLET, FILM COATED ORAL DAILY
Qty: 90 TABLET | Refills: 3 | Status: SHIPPED | OUTPATIENT
Start: 2024-02-06

## 2024-02-06 RX ORDER — NORETHINDRONE ACETATE AND ETHINYL ESTRADIOL 1MG-20(21)
KIT ORAL
Qty: 1 PACKET | Refills: 11 | Status: SHIPPED | OUTPATIENT
Start: 2024-02-06 | End: 2024-02-07 | Stop reason: SDUPTHER

## 2024-02-06 RX ORDER — ARIPIPRAZOLE 2 MG/1
2 TABLET ORAL DAILY
Qty: 90 TABLET | Refills: 1 | Status: SHIPPED | OUTPATIENT
Start: 2024-02-06 | End: 2024-02-07 | Stop reason: SDUPTHER

## 2024-02-06 NOTE — TELEPHONE ENCOUNTER
Medication Refill Request    Carrie Aguilera is requesting a refill of the following medication(s):   ARIPiprazole (ABILIFY) 2 MG tablet   Please send refill to:     [facility] [address] [place] [address] - [address] [facility] -  [phone] - F [phone]  [address] [facility] [address]  Phone: [phone] Fax: [phone]

## 2024-02-07 RX ORDER — ARIPIPRAZOLE 2 MG/1
2 TABLET ORAL DAILY
Qty: 90 TABLET | Refills: 1 | Status: SHIPPED | OUTPATIENT
Start: 2024-02-07

## 2024-02-07 RX ORDER — NORETHINDRONE ACETATE AND ETHINYL ESTRADIOL 1MG-20(21)
KIT ORAL
Qty: 1 PACKET | Refills: 11 | Status: SHIPPED | OUTPATIENT
Start: 2024-02-07

## 2024-02-08 RX ORDER — GLIPIZIDE 5 MG/1
5 TABLET, FILM COATED, EXTENDED RELEASE ORAL DAILY
Qty: 90 TABLET | Refills: 3 | Status: SHIPPED | OUTPATIENT
Start: 2024-02-08

## 2024-02-15 ENCOUNTER — TELEPHONE (OUTPATIENT)
Age: 34
End: 2024-02-15

## 2024-02-15 NOTE — TELEPHONE ENCOUNTER
----- Message from Donya Taylorone sent at 2/15/2024 11:03 AM EST -----  Subject: Message to Provider    QUESTIONS  Information for Provider? Patient needed to r/s appt with Lily Rodriguez.   [facility] unable to do this. Transferred to office, but patient d/c call before   reaching. Please call patient to reschedule appt, Thank you   ---------------------------------------------------------------------------  --------------  CALL BACK INFO  [phone]; OK to leave message on voicemail  ---------------------------------------------------------------------------  --------------  SCRIPT ANSWERS  Relationship to Patient? Self

## 2024-02-19 DIAGNOSIS — F51.01 PRIMARY INSOMNIA: ICD-10-CM

## 2024-02-20 ENCOUNTER — OFFICE VISIT (OUTPATIENT)
Age: 34
End: 2024-02-20
Payer: MEDICAID

## 2024-02-20 VITALS
BODY MASS INDEX: 47.32 KG/M2 | WEIGHT: 241 LBS | DIASTOLIC BLOOD PRESSURE: 68 MMHG | SYSTOLIC BLOOD PRESSURE: 100 MMHG | HEIGHT: 60 IN | RESPIRATION RATE: 20 BRPM | TEMPERATURE: 97.1 F | HEART RATE: 102 BPM | OXYGEN SATURATION: 97 %

## 2024-02-20 DIAGNOSIS — J02.9 SORE THROAT: ICD-10-CM

## 2024-02-20 DIAGNOSIS — J40 BRONCHITIS: Primary | ICD-10-CM

## 2024-02-20 LAB
RSV RNA: NEGATIVE
STREP PYOGENES DNA, POC: NEGATIVE
VALID INTERNAL CONTROL, POC: YES

## 2024-02-20 PROCEDURE — 87634 RSV DNA/RNA AMP PROBE: CPT | Performed by: NURSE PRACTITIONER

## 2024-02-20 PROCEDURE — 99213 OFFICE O/P EST LOW 20 MIN: CPT | Performed by: NURSE PRACTITIONER

## 2024-02-20 PROCEDURE — 96372 THER/PROPH/DIAG INJ SC/IM: CPT | Performed by: NURSE PRACTITIONER

## 2024-02-20 PROCEDURE — 87651 STREP A DNA AMP PROBE: CPT | Performed by: NURSE PRACTITIONER

## 2024-02-20 RX ORDER — ZOLPIDEM TARTRATE 10 MG/1
10 TABLET ORAL NIGHTLY PRN
Qty: 30 TABLET | Refills: 2 | Status: SHIPPED | OUTPATIENT
Start: 2024-02-20 | End: 2024-05-20

## 2024-02-20 RX ORDER — FENOFIBRATE 145 MG/1
145 TABLET, COATED ORAL DAILY
Qty: 90 TABLET | Refills: 1 | Status: SHIPPED | OUTPATIENT
Start: 2024-02-20

## 2024-02-20 RX ORDER — METHYLPREDNISOLONE ACETATE 40 MG/ML
40 INJECTION, SUSPENSION INTRA-ARTICULAR; INTRALESIONAL; INTRAMUSCULAR; SOFT TISSUE ONCE
Status: COMPLETED | OUTPATIENT
Start: 2024-02-20 | End: 2024-02-20

## 2024-02-20 RX ORDER — DULOXETIN HYDROCHLORIDE 30 MG/1
30 CAPSULE, DELAYED RELEASE ORAL DAILY
Qty: 90 CAPSULE | Refills: 3 | Status: SHIPPED | OUTPATIENT
Start: 2024-02-20

## 2024-02-20 RX ADMIN — METHYLPREDNISOLONE ACETATE 40 MG: 40 INJECTION, SUSPENSION INTRA-ARTICULAR; INTRALESIONAL; INTRAMUSCULAR; SOFT TISSUE at 08:29

## 2024-02-20 ASSESSMENT — PATIENT HEALTH QUESTIONNAIRE - PHQ9
2. FEELING DOWN, DEPRESSED OR HOPELESS: 0
SUM OF ALL RESPONSES TO PHQ QUESTIONS 1-9: 0
SUM OF ALL RESPONSES TO PHQ QUESTIONS 1-9: 0
1. LITTLE INTEREST OR PLEASURE IN DOING THINGS: 0
5. POOR APPETITE OR OVEREATING: 0
7. TROUBLE CONCENTRATING ON THINGS, SUCH AS READING THE NEWSPAPER OR WATCHING TELEVISION: 0
SUM OF ALL RESPONSES TO PHQ QUESTIONS 1-9: 0
SUM OF ALL RESPONSES TO PHQ QUESTIONS 1-9: 0
6. FEELING BAD ABOUT YOURSELF - OR THAT YOU ARE A FAILURE OR HAVE LET YOURSELF OR YOUR FAMILY DOWN: 0
SUM OF ALL RESPONSES TO PHQ9 QUESTIONS 1 & 2: 0
9. THOUGHTS THAT YOU WOULD BE BETTER OFF DEAD, OR OF HURTING YOURSELF: 0
4. FEELING TIRED OR HAVING LITTLE ENERGY: 0
3. TROUBLE FALLING OR STAYING ASLEEP: 0
10. IF YOU CHECKED OFF ANY PROBLEMS, HOW DIFFICULT HAVE THESE PROBLEMS MADE IT FOR YOU TO DO YOUR WORK, TAKE CARE OF THINGS AT HOME, OR GET ALONG WITH OTHER PEOPLE: 0
8. MOVING OR SPEAKING SO SLOWLY THAT OTHER PEOPLE COULD HAVE NOTICED. OR THE OPPOSITE, BEING SO FIGETY OR RESTLESS THAT YOU HAVE BEEN MOVING AROUND A LOT MORE THAN USUAL: 0

## 2024-02-21 RX ORDER — AZITHROMYCIN 250 MG/1
TABLET, FILM COATED ORAL
Qty: 6 TABLET | Refills: 0 | Status: SHIPPED | OUTPATIENT
Start: 2024-02-21 | End: 2024-03-02

## 2024-02-21 NOTE — PROGRESS NOTES
Patient was administered depo medrol 40 mg via IM injection.  Patient tolerated medication without noted side effects.  Medication information reviewed with patient, patient states understanding. Patient to resume routine medications at home.  Patient given copy of AVS with medication information and instructions for home.  AVS reviewed with patient and patient states understanding.     \"Have you been to the ER, urgent care clinic since your last visit?  Hospitalized since your last visit?\"    NO    “Have you seen or consulted any other health care providers outside of [facility] since your last visit?”    NO  Chief Complaint   Patient presents with    Ear Fullness     Left     Cough       Vitals:    02/20/24 0741   BP: 100/68   Pulse: (!) 102   Resp: 20   Temp: 97.1 °F (36.2 °C)   SpO2: 97%            
Maternal Grandmother     Asthma Sister     Hypertension Paternal Grandfather     Depression Mother         postpartum    Alcohol Abuse Father     Drug Abuse Maternal Uncle     Cancer Maternal Grandfather         lungs    Anxiety Disorder Mother      Social History     Socioeconomic History    Marital status: Single     Spouse name: None    Number of children: None    Years of education: None    Highest education level: None   Tobacco Use    Smoking status: Former     Current packs/day: 0.00     Types: Cigarettes     Quit date:      Years since quittin.1     Passive exposure: Past    Smokeless tobacco: Never   Vaping Use    Vaping Use: Never used   Substance and Sexual Activity    Alcohol use: No    Drug use: No    Sexual activity: Defer     Social Determinants of Health     Financial Resource Strain: Low Risk  (2022)    Overall Financial Resource Strain (CARDIA)     Difficulty of Paying Living Expenses: Not hard at all   Transportation Needs: No Transportation Needs (2023)    PRAPARE - Transportation     Lack of Transportation (Medical): No     Lack of Transportation (Non-Medical): No   Physical Activity: Inactive (3/1/2023)    Exercise Vital Sign     Days of Exercise per Week: 0 days     Minutes of Exercise per Session: 0 min   Stress: Stress Concern Present (3/1/2023)    [employer] [place] [employer] - Occupational Stress Questionnaire     Feeling of Stress : Rather much   Social Connections: Socially Isolated (3/1/2023)    Social Connection and Isolation Panel [NHANES]     Frequency of Communication with Friends and Family: More than three times a week     Frequency of Social Gatherings with Friends and Family: Once a week     Attends Denominational Services: Never     Active Member of Clubs or Organizations: No     Attends Club or Organization Meetings: Never     Marital Status: Never    Intimate Partner Violence: Not At Risk (3/1/2023)    Humiliation, Afraid, Rape, and Kick

## 2024-03-01 ENCOUNTER — OFFICE VISIT (OUTPATIENT)
Age: 34
End: 2024-03-01

## 2024-03-01 DIAGNOSIS — E11.9 TYPE 2 DIABETES MELLITUS WITHOUT COMPLICATION, WITHOUT LONG-TERM CURRENT USE OF INSULIN (HCC): Primary | ICD-10-CM

## 2024-03-01 NOTE — PROGRESS NOTES
[facility]  Diabetes Self-Management Education & Support Program  Encounter Note      SUMMARY  Diabetes self-care management training was completed related to eating healthy for heart health.  The participant did identify SMART Goal(s) and will practice knowledge and skills related to eating healthy to improve diabetes self-management.      EVALUATION:     Carrie Aguilera actively participated in individual session.  States she has been sick recently with COVID and a stomach virus. Taught sick day guidelines. Per CGM, BG has been in range 89% of the time. Discussed easy ways to get exercise into their lifestyle like chair exercises and walking during TV commercials. Has not been drinking sodas, reading food labels. She believes that her Trulicity is helping to decrease her appetite as she is able to stop eating when she feels full even if there is still food on her plate. States she has challenges staying within 45-60 grams CHO at her meals. States she does not drink milk or eat yogurt, but will occasionally eat cheese but not on a daily basis. Patient may benefit from a calcium/vitamin D supplement. States she will look at [employer] today. Has elimated refined sugars, eating high fiber, and eating 1/2 her plate as veggies. Unsure if she is losing but states clothes are fitting the same. Discussed recording food intake to help make her feel more in control in her eating and aide in weight loss as she voiced frustration with not losing weight.   RECOMMENDATIONS:  Participant to work on SMART goal listed below. Call [facility] if education questions or concerns arise. Contact provider with any medical concerns.     TOPICS DISCUSSED TODAY:  Eating healthy: 60 minutes    Next provider visit is scheduled for   Future Appointments   Date Time Provider Department Center   3/5/2024 10:40 AM [place], Loli, APRN - NP [facility]   11/12/2024  3:30 PM Magi Albarado APRN - NP [facility] AM[facility]          SMART

## 2024-03-05 ENCOUNTER — OFFICE VISIT (OUTPATIENT)
Age: 34
End: 2024-03-05
Payer: MEDICAID

## 2024-03-05 ENCOUNTER — HOSPITAL ENCOUNTER (OUTPATIENT)
Facility: HOSPITAL | Age: 34
Setting detail: SPECIMEN
Discharge: HOME | End: 2024-03-08

## 2024-03-05 VITALS
OXYGEN SATURATION: 99 % | DIASTOLIC BLOOD PRESSURE: 73 MMHG | RESPIRATION RATE: 16 BRPM | HEIGHT: 60 IN | TEMPERATURE: 97.5 F | SYSTOLIC BLOOD PRESSURE: 110 MMHG | WEIGHT: 242 LBS | HEART RATE: 94 BPM | BODY MASS INDEX: 47.51 KG/M2

## 2024-03-05 DIAGNOSIS — K75.81 NASH (NONALCOHOLIC STEATOHEPATITIS): ICD-10-CM

## 2024-03-05 DIAGNOSIS — K75.81 NASH (NONALCOHOLIC STEATOHEPATITIS): Primary | ICD-10-CM

## 2024-03-05 LAB — LABCORP SPECIMEN COLLECTION: NORMAL

## 2024-03-05 PROCEDURE — 99213 OFFICE O/P EST LOW 20 MIN: CPT | Performed by: NURSE PRACTITIONER

## 2024-03-05 RX ORDER — METFORMIN HYDROCHLORIDE 500 MG/1
TABLET, EXTENDED RELEASE ORAL
COMMUNITY
Start: 2024-02-22

## 2024-03-05 RX ORDER — GLIPIZIDE 5 MG/1
TABLET ORAL
COMMUNITY
Start: 2024-02-22

## 2024-03-05 RX ORDER — ACETAMINOPHEN AND CODEINE PHOSPHATE 120; 12 MG/5ML; MG/5ML
1 SOLUTION ORAL DAILY
COMMUNITY
Start: 2024-02-06

## 2024-03-05 NOTE — PROGRESS NOTES
mg, Oral, NIGHTLY PRN     SYSTEM REVIEW NOT RELATED TO LIVER DISEASE OR REVIEWED ABOVE:  Constitution systems: Negative for fever, chills, weight gain, weight loss.   Eyes: Negative for visual changes.  ENT: Negative for sore throat, painful swallowing.   Respiratory: Negative for cough, hemoptysis, SOB.   Cardiology: Negative for chest pain, palpitations.  GI:  Negative for constipation or diarrhea.  : Negative for urinary frequency, dysuria, hematuria, nocturia.   Skin: Negative for rash.  Hematology: Negative for easy bruising, blood clots.    Musculo-skelatal: Negative for back pain, muscle pain, weakness.  Neurologic: Negative for headaches, dizziness, vertigo, memory problems not related to HE.  Psychology: Negative for anxiety, depression.     FAMILY HISTORY:  The father Has/had the following following chronic disease(s): father has HCV, sleep apnea  The mother Has/had the following chronic disease(s): sleep apnea.    There is no family history of immune disorders.    SOCIAL HISTORY:  The patient is single.   The patient has 1 child.    The patient stopped using tobacco products in 2012.    The patient consumes alcohol on social occasions never in excess.      PHYSICAL EXAMINATION:  /73 (Site: Right Upper Arm, Position: Sitting, Cuff Size: Large Adult)   Pulse 94   Temp 97.5 °F (36.4 °C) (Temporal)   Resp 16   Ht 1.524 m (5')   Wt 109.8 kg (242 lb)   SpO2 99%   BMI 47.26 kg/m²     General: No acute distress.   Eyes: Sclera anicteric.   ENT: No oral lesions.  Thyroid normal.  Nodes: No adenopathy.   Skin: No spider angiomata.  No jaundice.  No palmar erythema.  Respiratory: Lungs clear to auscultation.   Cardiovascular: Regular heart rate.  No murmurs.  No JVD.  Abdomen: Soft non-tender. Liver size normal to percussion/palpation. Spleen not palpable. No obvious ascites.  Extremities: No edema.  No muscle wasting.  No gross arthritic changes.  Neurologic: Alert and oriented.  Cranial nerves

## 2024-03-06 LAB
ALBUMIN SERPL-MCNC: 4.8 G/DL (ref 3.9–4.9)
ALP SERPL-CCNC: 69 IU/L (ref 44–121)
ALT SERPL-CCNC: 181 IU/L (ref 0–32)
AST SERPL-CCNC: 117 IU/L (ref 0–40)
BASOPHILS # BLD AUTO: 0.1 X10E3/UL (ref 0–0.2)
BASOPHILS NFR BLD AUTO: 1 %
BILIRUB DIRECT SERPL-MCNC: 0.17 MG/DL (ref 0–0.4)
BILIRUB SERPL-MCNC: 0.4 MG/DL (ref 0–1.2)
BUN SERPL-MCNC: 12 MG/DL (ref 6–20)
BUN/CREAT SERPL: 19 (ref 9–23)
CALCIUM SERPL-MCNC: 10.3 MG/DL (ref 8.7–10.2)
CHLORIDE SERPL-SCNC: 103 MMOL/L (ref 96–106)
CO2 SERPL-SCNC: 18 MMOL/L (ref 20–29)
CREAT SERPL-MCNC: 0.62 MG/DL (ref 0.57–1)
EGFRCR SERPLBLD CKD-EPI 2021: 122 ML/MIN/1.73
EOSINOPHIL # BLD AUTO: 0.3 X10E3/UL (ref 0–0.4)
EOSINOPHIL NFR BLD AUTO: 2 %
ERYTHROCYTE [DISTWIDTH] IN BLOOD BY AUTOMATED COUNT: 12.1 % (ref 11.7–15.4)
GLUCOSE SERPL-MCNC: 78 MG/DL (ref 70–99)
HCT VFR BLD AUTO: 39.6 % (ref 34–46.6)
HGB BLD-MCNC: 13.2 G/DL (ref 11.1–15.9)
IMM GRANULOCYTES # BLD AUTO: 0 X10E3/UL (ref 0–0.1)
IMM GRANULOCYTES NFR BLD AUTO: 0 %
LYMPHOCYTES # BLD AUTO: 4 X10E3/UL (ref 0.7–3.1)
LYMPHOCYTES NFR BLD AUTO: 33 %
MCH RBC QN AUTO: 30.2 PG (ref 26.6–33)
MCHC RBC AUTO-ENTMCNC: 33.3 G/DL (ref 31.5–35.7)
MCV RBC AUTO: 91 FL (ref 79–97)
MONOCYTES # BLD AUTO: 0.6 X10E3/UL (ref 0.1–0.9)
MONOCYTES NFR BLD AUTO: 5 %
NEUTROPHILS # BLD AUTO: 7.2 X10E3/UL (ref 1.4–7)
NEUTROPHILS NFR BLD AUTO: 59 %
PLATELET # BLD AUTO: 512 X10E3/UL (ref 150–450)
POTASSIUM SERPL-SCNC: 4.4 MMOL/L (ref 3.5–5.2)
PROT SERPL-MCNC: 7.9 G/DL (ref 6–8.5)
RBC # BLD AUTO: 4.37 X10E6/UL (ref 3.77–5.28)
SODIUM SERPL-SCNC: 140 MMOL/L (ref 134–144)
WBC # BLD AUTO: 12.3 X10E3/UL (ref 3.4–10.8)

## 2024-03-12 ENCOUNTER — OFFICE VISIT (OUTPATIENT)
Age: 34
End: 2024-03-12
Payer: MEDICAID

## 2024-03-12 VITALS
DIASTOLIC BLOOD PRESSURE: 66 MMHG | BODY MASS INDEX: 40.48 KG/M2 | RESPIRATION RATE: 18 BRPM | HEART RATE: 96 BPM | HEIGHT: 65 IN | OXYGEN SATURATION: 98 % | SYSTOLIC BLOOD PRESSURE: 108 MMHG | WEIGHT: 243 LBS | TEMPERATURE: 97.8 F

## 2024-03-12 DIAGNOSIS — D50.9 IRON DEFICIENCY ANEMIA, UNSPECIFIED IRON DEFICIENCY ANEMIA TYPE: ICD-10-CM

## 2024-03-12 DIAGNOSIS — R79.89 ABNORMAL CBC: Primary | ICD-10-CM

## 2024-03-12 PROCEDURE — 36415 COLL VENOUS BLD VENIPUNCTURE: CPT | Performed by: NURSE PRACTITIONER

## 2024-03-12 PROCEDURE — 99213 OFFICE O/P EST LOW 20 MIN: CPT | Performed by: NURSE PRACTITIONER

## 2024-03-12 NOTE — PROGRESS NOTES
Chief Complaint   Patient presents with    repeat WBC       Vitals:    03/12/24 1134   BP: 108/66   Pulse: 96   Resp: 18   Temp: 97.8 °F (36.6 °C)   SpO2: 98%       
Patient here for labs  drawn from right  antecubital without pain or bruising.   
\"Have you been to the ER, urgent care clinic since your last visit?  Hospitalized since your last visit?\"    NO    “Have you seen or consulted any other health care providers outside of [facility] since your last visit?”    NO         
minutes reviewing previous notes, test results and face to face with the patient discussing the diagnosis and importance of compliance with the treatment plan as well as documenting on the day of the visit.  Verbal and written instructions (see AVS) provided.  Patient expresses understanding of diagnosis and treatment plan.    Health Maintenance Due   Topic Date Due    Hepatitis B vaccine (1 of 3 - 3-dose series) Never done    COVID-19 Vaccine (1) Never done    Varicella vaccine (1 of 2 - 2-dose childhood series) Never done    Pneumococcal 0-64 years Vaccine (1 - PCV) Never done    Diabetic retinal exam  Never done    Flu vaccine (1) 08/01/2023    Diabetic foot exam  03/03/2024         No follow-up provider specified.      DARY MelchorC

## 2024-03-13 LAB
BASOPHILS # BLD AUTO: 0.1 X10E3/UL (ref 0–0.2)
BASOPHILS NFR BLD AUTO: 1 %
EOSINOPHIL # BLD AUTO: 0.3 X10E3/UL (ref 0–0.4)
EOSINOPHIL NFR BLD AUTO: 3 %
ERYTHROCYTE [DISTWIDTH] IN BLOOD BY AUTOMATED COUNT: 12.2 % (ref 11.7–15.4)
HCT VFR BLD AUTO: 37.4 % (ref 34–46.6)
HGB BLD-MCNC: 12 G/DL (ref 11.1–15.9)
IMM GRANULOCYTES # BLD AUTO: 0 X10E3/UL (ref 0–0.1)
IMM GRANULOCYTES NFR BLD AUTO: 0 %
IRON SATN MFR SERPL: 13 % (ref 15–55)
IRON SERPL-MCNC: 62 UG/DL (ref 27–159)
LYMPHOCYTES # BLD AUTO: 3.9 X10E3/UL (ref 0.7–3.1)
LYMPHOCYTES NFR BLD AUTO: 35 %
MCH RBC QN AUTO: 29.5 PG (ref 26.6–33)
MCHC RBC AUTO-ENTMCNC: 32.1 G/DL (ref 31.5–35.7)
MCV RBC AUTO: 92 FL (ref 79–97)
MONOCYTES # BLD AUTO: 0.4 X10E3/UL (ref 0.1–0.9)
MONOCYTES NFR BLD AUTO: 4 %
NEUTROPHILS # BLD AUTO: 6.4 X10E3/UL (ref 1.4–7)
NEUTROPHILS NFR BLD AUTO: 57 %
PLATELET # BLD AUTO: 504 X10E3/UL (ref 150–450)
RBC # BLD AUTO: 4.07 X10E6/UL (ref 3.77–5.28)
TIBC SERPL-MCNC: 477 UG/DL (ref 250–450)
UIBC SERPL-MCNC: 415 UG/DL (ref 131–425)
WBC # BLD AUTO: 11.1 X10E3/UL (ref 3.4–10.8)

## 2024-03-13 NOTE — RESULT ENCOUNTER NOTE
Patient verified stating name and date of birth.  Patient informed of lab results and states understanding.

## 2024-03-14 DIAGNOSIS — F51.01 PRIMARY INSOMNIA: ICD-10-CM

## 2024-03-14 RX ORDER — ATORVASTATIN CALCIUM 40 MG/1
40 TABLET, FILM COATED ORAL DAILY
Qty: 90 TABLET | Refills: 3 | OUTPATIENT
Start: 2024-03-14

## 2024-03-14 RX ORDER — DULOXETIN HYDROCHLORIDE 30 MG/1
30 CAPSULE, DELAYED RELEASE ORAL DAILY
Qty: 90 CAPSULE | Refills: 3 | OUTPATIENT
Start: 2024-03-14

## 2024-03-14 RX ORDER — ZOLPIDEM TARTRATE 10 MG/1
10 TABLET ORAL NIGHTLY PRN
Qty: 30 TABLET | Refills: 2 | OUTPATIENT
Start: 2024-03-14 | End: 2024-06-12

## 2024-03-15 ENCOUNTER — TELEPHONE (OUTPATIENT)
Age: 34
End: 2024-03-15

## 2024-03-15 DIAGNOSIS — F51.01 PRIMARY INSOMNIA: ICD-10-CM

## 2024-03-15 NOTE — TELEPHONE ENCOUNTER
Medication Refill Request    Carrie Aguilera is requesting a refill of the following medication(s):   zolpidem (AMBIEN) 10 MG tablet  Please send refill to:     [facility] [address] [place] [address] - [address] [facility] -  [phone] - F [phone]  [address] [facility] [address]  Phone: [phone] Fax: [phone]

## 2024-03-15 NOTE — TELEPHONE ENCOUNTER
Patient requesting refill on     Requested Prescriptions     Pending Prescriptions Disp Refills    zolpidem (AMBIEN) 10 MG tablet 30 tablet 2     Sig: Take 1 tablet by mouth nightly as needed for Sleep for up to 90 days. Max Daily Amount: 10 mg        Last OV 3/12/2024

## 2024-03-18 ENCOUNTER — PATIENT MESSAGE (OUTPATIENT)
Age: 34
End: 2024-03-18

## 2024-03-18 ENCOUNTER — NURSE ONLY (OUTPATIENT)
Age: 34
End: 2024-03-18

## 2024-03-18 DIAGNOSIS — E11.65 TYPE 2 DIABETES MELLITUS WITH HYPERGLYCEMIA, WITHOUT LONG-TERM CURRENT USE OF INSULIN (HCC): ICD-10-CM

## 2024-03-18 DIAGNOSIS — E11.65 POORLY CONTROLLED DIABETES MELLITUS (HCC): ICD-10-CM

## 2024-03-20 ENCOUNTER — OFFICE VISIT (OUTPATIENT)
Age: 34
End: 2024-03-20
Payer: MEDICAID

## 2024-03-20 VITALS
HEIGHT: 65 IN | WEIGHT: 243 LBS | OXYGEN SATURATION: 98 % | TEMPERATURE: 98.1 F | RESPIRATION RATE: 20 BRPM | HEART RATE: 112 BPM | DIASTOLIC BLOOD PRESSURE: 80 MMHG | BODY MASS INDEX: 40.48 KG/M2 | SYSTOLIC BLOOD PRESSURE: 102 MMHG

## 2024-03-20 DIAGNOSIS — R09.81 HEAD CONGESTION: ICD-10-CM

## 2024-03-20 DIAGNOSIS — R68.89 FLU-LIKE SYMPTOMS: Primary | ICD-10-CM

## 2024-03-20 DIAGNOSIS — J02.9 SORE THROAT: ICD-10-CM

## 2024-03-20 LAB
EXP DATE SOLUTION: NORMAL
EXP DATE SWAB: NORMAL
EXPIRATION DATE: NORMAL
INFLUENZA A ANTIGEN, POC: NEGATIVE
INFLUENZA B ANTIGEN, POC: POSITIVE
LOT NUMBER POC: NORMAL
LOT NUMBER SOLUTION: NORMAL
LOT NUMBER SWAB: NORMAL
RSV RNA: NEGATIVE
SARS-COV-2 RNA, POC: NEGATIVE
STREP PYOGENES DNA, POC: NEGATIVE
VALID INTERNAL CONTROL, POC: YES
VALID INTERNAL CONTROL, POC: YES

## 2024-03-20 PROCEDURE — 87635 SARS-COV-2 COVID-19 AMP PRB: CPT | Performed by: NURSE PRACTITIONER

## 2024-03-20 PROCEDURE — 99213 OFFICE O/P EST LOW 20 MIN: CPT | Performed by: NURSE PRACTITIONER

## 2024-03-20 PROCEDURE — 87651 STREP A DNA AMP PROBE: CPT | Performed by: NURSE PRACTITIONER

## 2024-03-20 PROCEDURE — 87502 INFLUENZA DNA AMP PROBE: CPT | Performed by: NURSE PRACTITIONER

## 2024-03-20 PROCEDURE — 87634 RSV DNA/RNA AMP PROBE: CPT | Performed by: NURSE PRACTITIONER

## 2024-03-20 RX ORDER — ZOLPIDEM TARTRATE 10 MG/1
10 TABLET ORAL NIGHTLY PRN
Qty: 30 TABLET | Refills: 2 | Status: SHIPPED | OUTPATIENT
Start: 2024-03-20 | End: 2024-06-18

## 2024-03-20 RX ORDER — AMOXICILLIN AND CLAVULANATE POTASSIUM 875; 125 MG/1; MG/1
1 TABLET, FILM COATED ORAL 2 TIMES DAILY
Qty: 20 TABLET | Refills: 0 | Status: SHIPPED | OUTPATIENT
Start: 2024-03-20 | End: 2024-03-30

## 2024-03-20 ASSESSMENT — PATIENT HEALTH QUESTIONNAIRE - PHQ9
2. FEELING DOWN, DEPRESSED OR HOPELESS: NOT AT ALL
6. FEELING BAD ABOUT YOURSELF - OR THAT YOU ARE A FAILURE OR HAVE LET YOURSELF OR YOUR FAMILY DOWN: NOT AT ALL
4. FEELING TIRED OR HAVING LITTLE ENERGY: NOT AT ALL
SUM OF ALL RESPONSES TO PHQ QUESTIONS 1-9: 0
3. TROUBLE FALLING OR STAYING ASLEEP: NOT AT ALL
8. MOVING OR SPEAKING SO SLOWLY THAT OTHER PEOPLE COULD HAVE NOTICED. OR THE OPPOSITE, BEING SO FIGETY OR RESTLESS THAT YOU HAVE BEEN MOVING AROUND A LOT MORE THAN USUAL: NOT AT ALL
10. IF YOU CHECKED OFF ANY PROBLEMS, HOW DIFFICULT HAVE THESE PROBLEMS MADE IT FOR YOU TO DO YOUR WORK, TAKE CARE OF THINGS AT HOME, OR GET ALONG WITH OTHER PEOPLE: NOT DIFFICULT AT ALL
9. THOUGHTS THAT YOU WOULD BE BETTER OFF DEAD, OR OF HURTING YOURSELF: NOT AT ALL
7. TROUBLE CONCENTRATING ON THINGS, SUCH AS READING THE NEWSPAPER OR WATCHING TELEVISION: NOT AT ALL
SUM OF ALL RESPONSES TO PHQ QUESTIONS 1-9: 0
SUM OF ALL RESPONSES TO PHQ9 QUESTIONS 1 & 2: 0
1. LITTLE INTEREST OR PLEASURE IN DOING THINGS: NOT AT ALL
SUM OF ALL RESPONSES TO PHQ QUESTIONS 1-9: 0
SUM OF ALL RESPONSES TO PHQ QUESTIONS 1-9: 0

## 2024-03-21 LAB
CREAT 24H UR-MRATE: 1707 MG/24 HR (ref 800–1800)
CREAT UR-MCNC: 121.9 MG/DL

## 2024-03-21 NOTE — TELEPHONE ENCOUNTER
Patient having issues with fatigue. AHI 4.8/hr on current settings. I think she may benefit from an increase in pressure. Settings changed to APAP 5-10 cmH2O remotely by me in AirView. Remote download in 4 weeks. Patient notified.     AGNIESZKA Kramer, [facility]   Nurse Practitioner  [facility] [place]

## 2024-03-21 NOTE — TELEPHONE ENCOUNTER
Christiano Walsh MA 3/18/2024 10:17 AM EDT      ----- Message -----  From: Carrie Aguilera  Sent: 3/18/2024 9:22 AM EDT  To: [facility] Clinical Staff  Subject: Question     Hi I wanted to reach out because I am really struggling with being sleepy/drowsy during the day most days, I was told it could be my sugar but when I check it it’s never too high to be causing it or low either. I have issues with frequently waking up at night and taking my cpap off during my sleep and constantly putting it back on during the night. I’ve tried both the over mouth/nose mask and the nose mask I currently use - it makes no difference I still take it off in my sleep. I also take medication to help me sleep because if I don’t I simply can’t sleep. I just felt like I needed to reach out about this issue.

## 2024-03-21 NOTE — PROGRESS NOTES
Chief Complaint   Patient presents with    Cough    Sinus Problem       Vitals:    03/20/24 1306   BP: 102/80   Pulse: (!) 112   Resp: 20   Temp: 98.1 °F (36.7 °C)   SpO2: 98%       
\"Have you been to the ER, urgent care clinic since your last visit?  Hospitalized since your last visit?\"    NO    “Have you seen or consulted any other health care providers outside of [facility] since your last visit?”    NO            Click Here for Release of Records Request  
daily (Patient not taking: Reported on 3/5/2024) 120 capsule 3    glucose monitoring kit Use to check blood sugar daily E11.65 1 kit 0    Continuous Blood Gluc  (FREESTYLE SANDY READER) RICH Check glucose twice a day and as needed. DX E11.65 1 each 0    cetirizine (ZYRTEC) 10 MG tablet Take 1 tablet by mouth daily      fluticasone (FLONASE) 50 MCG/ACT nasal spray       blood glucose monitor strips Use to check blood sugar daily E11.65 100 strip 3    Lancets MISC Use to check blood sugar daily E11.65 100 each 3    albuterol sulfate HFA (PROVENTIL;VENTOLIN;PROAIR) 108 (90 Base) MCG/ACT inhaler Inhale 2 puffs into the lungs every 4 hours as needed       No current facility-administered medications for this visit.     Current Outpatient Medications on File Prior to Visit   Medication Sig Dispense Refill    zolpidem (AMBIEN) 10 MG tablet Take 1 tablet by mouth nightly as needed for Sleep for up to 90 days. Max Daily Amount: 10 mg 30 tablet 2    glipiZIDE (GLUCOTROL) 5 MG tablet       metFORMIN (GLUCOPHAGE-XR) 500 MG extended release tablet       norethindrone (MICRONOR) 0.35 MG tablet Take 1 tablet by mouth daily      fenofibrate (TRICOR) 145 MG tablet Take 1 tablet by mouth daily 90 tablet 1    Dulaglutide 4.5 MG/0.5ML SOPN Inject 4.5 mg into the skin once a week 12 Adjustable Dose Pre-filled Pen Syringe 3    DULoxetine (CYMBALTA) 30 MG extended release capsule Take 1 capsule by mouth daily 90 capsule 3    glipiZIDE (GLUCOTROL XL) 5 MG extended release tablet Take 1 tablet by mouth daily (Patient not taking: Reported on 3/5/2024) 90 tablet 3    ARIPiprazole (ABILIFY) 2 MG tablet Take 1 tablet by mouth daily 90 tablet 1    atorvastatin (LIPITOR) 40 MG tablet Take 1 tablet by mouth daily 90 tablet 3    Continuous Blood Gluc Sensor (FREESTYLE SANDY 2 SENSOR) MISC Check glucose twice a day and as needed. DX E11.65 2 each 12    omeprazole (PRILOSEC) 20 MG delayed release capsule Take 1 capsule by mouth daily 30

## 2024-03-23 LAB
CORTIS F 24H UR-MCNC: 10 UG/L
CORTIS F 24H UR-MRATE: 14 UG/24 HR (ref 6–42)

## 2024-04-17 ENCOUNTER — OFFICE VISIT (OUTPATIENT)
Age: 34
End: 2024-04-17
Payer: MEDICAID

## 2024-04-17 VITALS
DIASTOLIC BLOOD PRESSURE: 68 MMHG | BODY MASS INDEX: 40.77 KG/M2 | WEIGHT: 245 LBS | TEMPERATURE: 97.6 F | RESPIRATION RATE: 16 BRPM | SYSTOLIC BLOOD PRESSURE: 110 MMHG | HEART RATE: 69 BPM | OXYGEN SATURATION: 97 %

## 2024-04-17 DIAGNOSIS — F41.1 GAD (GENERALIZED ANXIETY DISORDER): ICD-10-CM

## 2024-04-17 DIAGNOSIS — E11.65 POORLY CONTROLLED DIABETES MELLITUS (HCC): Primary | ICD-10-CM

## 2024-04-17 DIAGNOSIS — L60.0 INGROWN NAIL OF FIFTH TOE: ICD-10-CM

## 2024-04-17 DIAGNOSIS — E11.9 COMPREHENSIVE DIABETIC FOOT EXAMINATION, TYPE 2 DM, ENCOUNTER FOR (HCC): ICD-10-CM

## 2024-04-17 LAB
ALBUMIN, URINE, POC: 10 MG/L
CREATININE, URINE, POC: 100 MG/DL
MICROALB/CREAT RATIO, POC: <30 MG/G

## 2024-04-17 PROCEDURE — 82044 UR ALBUMIN SEMIQUANTITATIVE: CPT | Performed by: NURSE PRACTITIONER

## 2024-04-17 PROCEDURE — 99214 OFFICE O/P EST MOD 30 MIN: CPT | Performed by: NURSE PRACTITIONER

## 2024-04-17 PROCEDURE — 36415 COLL VENOUS BLD VENIPUNCTURE: CPT | Performed by: NURSE PRACTITIONER

## 2024-04-17 NOTE — PROGRESS NOTES
Subjective:     Carrie Aguilera is a 33 y.o. female who presents today with the following:  Chief Complaint   Patient presents with    Discuss Labs    Diabetes    Anxiety       Patient Active Problem List   Diagnosis    Insomnia disorder with non-sleep disorder mental comorbidity    Recurrent major depressive disorder, in partial remission (HCC)    SANDY (generalized anxiety disorder)    Obstructive sleep apnea    NAFLD (nonalcoholic fatty liver disease)    Celiac disease    Hypertriglyceridemia    Type 2 diabetes mellitus without complication, without long-term current use of insulin (HCC)    Morbid obesity (HCC)         COMPLIANT WITH MEDICATION:    Denies chest pain, dyspnea, palpitations, headache and blurred vision. Blood pressure normotensive.    Diabetes. Established with endocrinology in [place]. She endorses she has had elevated  + with hypoglycemia in the 50's to 80's .  Ms. Aguilera endorses that she is closely watching her diet and taking her diabetes medication as prescribed and no longer has the extreme highs and lows.  She has cut out soda and sweet snacks.     Trulicity is on back order indefinitely.  We discussed changing to trajenta po until she has a follow up appointment with her endocrinologist in July.   Sugars controlled poorly   Hypoglycemia: resolved with change of diet and taking medication as prescribed.   Tolerating current treatment well   Current medications include   She is taking a 1/2 dose of glipiZIDE (GLUCOTROL) 5 MG tablet, , Disp: , Rfl:  2.5 mg po daily.    Dulaglutide 4.5 MG/0.5ML SOPN, Inject 4.5 mg into the skin once a week, Disp: 12 Adjustable Dose Pre-filled Pen Syringe, Rfl: 3 has 1 pen left.   Switch to  linagliptin (TRADJENTA) 5 MG tablet, Take 1 tablet by mouth daily, Disp: 90 tablet, Rfl: 1    No results found for: \"HBA1C\", \"GLU\", \"GESTF\", \"GLUCPOC\", \"MICROALBUMIN\", \"MCA2\", \"MCAU\", \"LDL\", \"LDLC\", \"ILANA\", \"CREAPOC\"  No results found for: \"MCA2\", \"MCAU\",

## 2024-04-17 NOTE — PROGRESS NOTES
\"Have you been to the ER, urgent care clinic since your last visit?  Hospitalized since your last visit?\"    NO    “Have you seen or consulted any other health care providers outside of [facility] since your last visit?”    NO  Chief Complaint   Patient presents with    Discuss Labs       Vitals:    04/17/24 0737   BP: 110/68   Pulse: 69   Resp: 16   Temp: 97.6 °F (36.4 °C)   SpO2: 97%               Click Here for Release of Records Request

## 2024-04-18 ENCOUNTER — TELEPHONE (OUTPATIENT)
Age: 34
End: 2024-04-18

## 2024-04-18 LAB
BASOPHILS # BLD AUTO: 0.1 X10E3/UL (ref 0–0.2)
BASOPHILS NFR BLD AUTO: 1 %
EOSINOPHIL # BLD AUTO: 0.3 X10E3/UL (ref 0–0.4)
EOSINOPHIL NFR BLD AUTO: 4 %
ERYTHROCYTE [DISTWIDTH] IN BLOOD BY AUTOMATED COUNT: 12.8 % (ref 11.7–15.4)
HBA1C MFR BLD: 7.3 % (ref 4.8–5.6)
HCT VFR BLD AUTO: 38.5 % (ref 34–46.6)
HGB BLD-MCNC: 12.7 G/DL (ref 11.1–15.9)
IMM GRANULOCYTES # BLD AUTO: 0 X10E3/UL (ref 0–0.1)
IMM GRANULOCYTES NFR BLD AUTO: 0 %
LYMPHOCYTES # BLD AUTO: 3.3 X10E3/UL (ref 0.7–3.1)
LYMPHOCYTES NFR BLD AUTO: 35 %
MCH RBC QN AUTO: 29.7 PG (ref 26.6–33)
MCHC RBC AUTO-ENTMCNC: 33 G/DL (ref 31.5–35.7)
MCV RBC AUTO: 90 FL (ref 79–97)
MONOCYTES # BLD AUTO: 0.5 X10E3/UL (ref 0.1–0.9)
MONOCYTES NFR BLD AUTO: 5 %
NEUTROPHILS # BLD AUTO: 5.2 X10E3/UL (ref 1.4–7)
NEUTROPHILS NFR BLD AUTO: 55 %
PLATELET # BLD AUTO: 490 X10E3/UL (ref 150–450)
RBC # BLD AUTO: 4.28 X10E6/UL (ref 3.77–5.28)
WBC # BLD AUTO: 9.4 X10E3/UL (ref 3.4–10.8)

## 2024-04-18 NOTE — TELEPHONE ENCOUNTER
LVM returning patient's message to clarify that Monday's appointment is a tech visit for a download.  She will not be seeing any providers.

## 2024-04-19 ENCOUNTER — TELEMEDICINE (OUTPATIENT)
Age: 34
End: 2024-04-19
Payer: MEDICAID

## 2024-04-19 DIAGNOSIS — S01.332A INFECTED PIERCED EAR, LEFT, INITIAL ENCOUNTER: Primary | ICD-10-CM

## 2024-04-19 DIAGNOSIS — L08.9 INFECTED PIERCED EAR, LEFT, INITIAL ENCOUNTER: Primary | ICD-10-CM

## 2024-04-19 LAB
ALBUMIN SERPL-MCNC: 4.8 G/DL (ref 3.9–4.9)
ALBUMIN/GLOB SERPL: 1.6 {RATIO} (ref 1.2–2.2)
ALP SERPL-CCNC: 75 IU/L (ref 44–121)
ALT SERPL-CCNC: 138 IU/L (ref 0–32)
AST SERPL-CCNC: 95 IU/L (ref 0–40)
BILIRUB SERPL-MCNC: 0.3 MG/DL (ref 0–1.2)
BUN SERPL-MCNC: 11 MG/DL (ref 6–20)
BUN/CREAT SERPL: 15 (ref 9–23)
CALCIUM SERPL-MCNC: 10.5 MG/DL (ref 8.7–10.2)
CHLORIDE SERPL-SCNC: 103 MMOL/L (ref 96–106)
CHOLEST SERPL-MCNC: 145 MG/DL (ref 100–199)
CO2 SERPL-SCNC: 18 MMOL/L (ref 20–29)
CREAT SERPL-MCNC: 0.73 MG/DL (ref 0.57–1)
EGFRCR SERPLBLD CKD-EPI 2021: 113 ML/MIN/1.73
GLOBULIN SER CALC-MCNC: 3 G/DL (ref 1.5–4.5)
GLUCOSE SERPL-MCNC: 136 MG/DL (ref 70–99)
HDLC SERPL-MCNC: 18 MG/DL
LDLC SERPL CALC-MCNC: 89 MG/DL (ref 0–99)
POTASSIUM SERPL-SCNC: 4.6 MMOL/L (ref 3.5–5.2)
PROT SERPL-MCNC: 7.8 G/DL (ref 6–8.5)
SODIUM SERPL-SCNC: 138 MMOL/L (ref 134–144)
T4 FREE SERPL-MCNC: 1.09 NG/DL (ref 0.82–1.77)
TRIGL SERPL-MCNC: 220 MG/DL (ref 0–149)
TSH SERPL DL<=0.005 MIU/L-ACNC: 1.36 UIU/ML (ref 0.45–4.5)
VLDLC SERPL CALC-MCNC: 38 MG/DL (ref 5–40)

## 2024-04-19 PROCEDURE — 99213 OFFICE O/P EST LOW 20 MIN: CPT

## 2024-04-19 RX ORDER — CIPROFLOXACIN 500 MG/1
500 TABLET, FILM COATED ORAL 2 TIMES DAILY
Qty: 10 TABLET | Refills: 0 | Status: SHIPPED | OUTPATIENT
Start: 2024-04-19 | End: 2024-04-24

## 2024-04-19 NOTE — PROGRESS NOTES
\"Have you been to the ER, urgent care clinic since your last visit?  Hospitalized since your last visit?\"    NO    “Have you seen or consulted any other health care providers outside of [facility] since your last visit?”    NO    Chief Complaint   Patient presents with    Other     Left ear piercing red, hot, swollen x 1 wk and yellow pustular drainage x 2 days        There were no vitals filed for this visit.        Click Here for Release of Records Request

## 2024-04-19 NOTE — PROGRESS NOTES
Carrie Aguilera, was evaluated through a synchronous (real-time) audio-video encounter. The patient (or guardian if applicable) is aware that this is a billable service, which includes applicable co-pays. This Virtual Visit was conducted with patient's (and/or legal guardian's) consent. Patient identification was verified, and a caregiver was present when appropriate.   The patient was located at Home: [address] [place] [id number]  Provider was located at Facility (Appt Dept): [address] [place] [phone]  Confirm you are appropriately licensed, registered, or certified to deliver care in the state where the patient is located as indicated above. If you are not or unsure, please re-schedule the visit: Yes, I confirm.     Carrie Aguilera (:  1990) is a Established patient, presenting virtually for evaluation of the following:    Assessment & Plan   Below is the assessment and plan developed based on review of pertinent history, physical exam, labs, studies, and medications.  1. Infected pierced ear, left, initial encounter  -     ciprofloxacin (CIPRO) 500 MG tablet; Take 1 tablet by mouth 2 times daily for 5 days, Disp-10 tablet, R-0Normal    Recommend she remove the piercings, but she is hesitant to do so to in an effort to avoid closing.  Continue the keep area clean, avoid excessive pressure on the tissue; complete ABX.    Return if symptoms worsen or fail to improve.       Subjective   Carrie Aguilera endorse left ear swelling, redness, tenderness, and purulent drainage from piercing sites after changing jewelry. She has been cleaning the areas with saline solution with minimal improvement; denies fever, chills, changes in hearing.      Review of Systems       Objective     [INSTRUCTIONS:  \"[x]\" Indicates a positive item  \"[]\" Indicates a negative item  -- DELETE ALL ITEMS NOT EXAMINED]    Constitutional: [x] Appears well-developed and well-nourished [x] No apparent

## 2024-04-22 ENCOUNTER — PROCEDURE VISIT (OUTPATIENT)
Age: 34
End: 2024-04-22

## 2024-04-22 DIAGNOSIS — G47.33 OSA (OBSTRUCTIVE SLEEP APNEA): Primary | ICD-10-CM

## 2024-05-23 RX ORDER — ARIPIPRAZOLE 2 MG/1
2 TABLET ORAL DAILY
Qty: 90 TABLET | Refills: 1 | Status: SHIPPED | OUTPATIENT
Start: 2024-05-23

## 2024-06-05 ENCOUNTER — NURSE ONLY (OUTPATIENT)
Age: 34
End: 2024-06-05

## 2024-06-05 DIAGNOSIS — F51.01 PRIMARY INSOMNIA: Primary | ICD-10-CM

## 2024-06-10 RX ORDER — LEVOCETIRIZINE DIHYDROCHLORIDE 5 MG/1
5 TABLET, FILM COATED ORAL DAILY
Qty: 90 TABLET | Refills: 0 | Status: SHIPPED | OUTPATIENT
Start: 2024-06-10

## 2024-06-12 LAB — DRUGS UR: NORMAL

## 2024-06-13 DIAGNOSIS — F51.01 PRIMARY INSOMNIA: ICD-10-CM

## 2024-06-13 RX ORDER — ZOLPIDEM TARTRATE 10 MG/1
10 TABLET ORAL NIGHTLY PRN
Qty: 30 TABLET | Refills: 2 | Status: SHIPPED | OUTPATIENT
Start: 2024-06-13 | End: 2024-09-11

## 2024-06-17 DIAGNOSIS — F51.01 PRIMARY INSOMNIA: ICD-10-CM

## 2024-06-18 RX ORDER — FENOFIBRATE 145 MG/1
145 TABLET, COATED ORAL DAILY
Qty: 90 TABLET | Refills: 1 | Status: SHIPPED | OUTPATIENT
Start: 2024-06-18

## 2024-06-18 NOTE — TELEPHONE ENCOUNTER
From: Carrie Aguilera  To: Office of Marley Mathur  Sent: 6/17/2024 6:20 PM EDT  Subject: Medication Renewal Request    Refills have been requested for the following medications:     fenofibrate (TRICOR) 145 MG tablet [Marley Mathur, APRN - NP]    Preferred pharmacy: [address] [phone] -  [phone]

## 2024-06-19 RX ORDER — ZOLPIDEM TARTRATE 10 MG/1
10 TABLET ORAL NIGHTLY PRN
Qty: 30 TABLET | Refills: 2 | OUTPATIENT
Start: 2024-06-19 | End: 2024-09-17

## 2024-06-19 NOTE — TELEPHONE ENCOUNTER
Patient requesting refill on     Requested Prescriptions     Pending Prescriptions Disp Refills    zolpidem (AMBIEN) 10 MG tablet 30 tablet 2     Sig: Take 1 tablet by mouth nightly as needed for Sleep for up to 90 days. Max Daily Amount: 10 mg        Last OV 04/19/2024

## 2024-06-25 RX ORDER — LEVOCETIRIZINE DIHYDROCHLORIDE 5 MG/1
5 TABLET, FILM COATED ORAL DAILY
Qty: 90 TABLET | Refills: 0 | Status: SHIPPED | OUTPATIENT
Start: 2024-06-25

## 2024-06-25 NOTE — TELEPHONE ENCOUNTER
From: Carrie Aguilera  To: Office of Marley Mathur  Sent: 6/22/2024 3:02 PM EDT  Subject: Medication Renewal Request    Refills have been requested for the following medications:     levocetirizine (XYZAL) 5 MG tablet [Marley Mathur, APRN - NP]   Patient Comment: Pharmacy said they sent a request for this but never got the new prescription for it to be filled     Preferred pharmacy: [facility] [address] [place] [address] [phone] - F [phone]

## 2024-07-03 ENCOUNTER — PATIENT MESSAGE (OUTPATIENT)
Age: 34
End: 2024-07-03

## 2024-07-05 RX ORDER — PEN NEEDLE, DIABETIC 31 G X1/4"
1 NEEDLE, DISPOSABLE MISCELLANEOUS DAILY
Qty: 100 EACH | Refills: 3 | Status: SHIPPED | OUTPATIENT
Start: 2024-07-05

## 2024-07-05 RX ORDER — PEN NEEDLE, DIABETIC 31 G X1/4"
1 NEEDLE, DISPOSABLE MISCELLANEOUS DAILY
Qty: 100 EACH | Refills: 3 | Status: SHIPPED | OUTPATIENT
Start: 2024-07-05 | End: 2024-07-05 | Stop reason: SDUPTHER

## 2024-07-05 NOTE — TELEPHONE ENCOUNTER
From: Carrie Aguilera  To: Marley Teran  Sent: 7/3/2024 8:08 PM EDT  Subject: Pen needles    Baldo teran, would it be possible for you to write me a prescription for the pen needles I need for the victoza Dr Salcido has me on? She didn’t write me one and I was hoping my insurance would cover them since it’s a daily injection. I’ve been using 31 gauge x 1/4 6mm pen needles.

## 2024-07-15 DIAGNOSIS — F51.01 PRIMARY INSOMNIA: ICD-10-CM

## 2024-07-16 RX ORDER — ZOLPIDEM TARTRATE 10 MG/1
10 TABLET ORAL NIGHTLY PRN
Qty: 30 TABLET | Refills: 2 | OUTPATIENT
Start: 2024-07-16 | End: 2024-10-14

## 2024-09-03 ENCOUNTER — OFFICE VISIT (OUTPATIENT)
Age: 34
End: 2024-09-03
Payer: MEDICAID

## 2024-09-03 VITALS
BODY MASS INDEX: 41.77 KG/M2 | TEMPERATURE: 97.9 F | HEART RATE: 111 BPM | RESPIRATION RATE: 18 BRPM | SYSTOLIC BLOOD PRESSURE: 110 MMHG | DIASTOLIC BLOOD PRESSURE: 70 MMHG | WEIGHT: 251 LBS | OXYGEN SATURATION: 99 %

## 2024-09-03 DIAGNOSIS — U07.1 COVID: Primary | ICD-10-CM

## 2024-09-03 DIAGNOSIS — U07.1 COVID: ICD-10-CM

## 2024-09-03 LAB
EXP DATE SOLUTION: NORMAL
EXP DATE SWAB: NORMAL
EXPIRATION DATE: NORMAL
INFLUENZA A ANTIGEN, POC: NEGATIVE
INFLUENZA B ANTIGEN, POC: NEGATIVE
LOT NUMBER POC: NORMAL
LOT NUMBER SOLUTION: NORMAL
LOT NUMBER SWAB: NORMAL
SARS-COV-2 RNA, POC: POSITIVE
VALID INTERNAL CONTROL, POC: YES

## 2024-09-03 PROCEDURE — 87502 INFLUENZA DNA AMP PROBE: CPT

## 2024-09-03 PROCEDURE — 87635 SARS-COV-2 COVID-19 AMP PRB: CPT

## 2024-09-03 PROCEDURE — 99214 OFFICE O/P EST MOD 30 MIN: CPT

## 2024-09-03 ASSESSMENT — ENCOUNTER SYMPTOMS
SHORTNESS OF BREATH: 0
GASTROINTESTINAL NEGATIVE: 1
COUGH: 1
WHEEZING: 0
SORE THROAT: 1

## 2024-09-03 NOTE — PROGRESS NOTES
\"Have you been to the ER, urgent care clinic since your last visit?  Hospitalized since your last visit?\"    NO    “Have you seen or consulted any other health care providers outside of [facility] since your last visit?”    NO      Chief Complaint   Patient presents with    Cough     Sinus congestion, fever, sore throat x 3 days        Vitals:    09/03/24 1002   BP: 110/70   Pulse: (!) 111   Resp: 18   Temp: 97.9 °F (36.6 °C)   SpO2: 99%

## 2024-09-03 NOTE — PROGRESS NOTES
Chief Complaint   Patient presents with    Cough     Sinus congestion, fever, sore throat x 3 days        HPI:     is a 34 y.o. female who presents for an acute visit.      She endorses nasal congestion, sore throat, cough, chills, and malaise that began about 3 days ago, but improving today.  She has been taking APAP and Mucinex with some relief of her symptoms.  Denies SOB, wheezing, dizziness, syncope.      No Known Allergies    Current Outpatient Medications   Medication Sig Dispense Refill    nirmatrelvir/ritonavir 300/100 (PAXLOVID, 300/100,) 20 x 150 MG & 10 x 100MG TBPK Take 3 tablets (two 150 mg nirmatrelvir and one 100 mg ritonavir tablets) by mouth every 12 hours for 5 days. 30 tablet 0    Insulin Pen Needle ([employer] PEN NEEDLES) 31G X 6 MM MISC 1 each by Does not apply route daily To administer Victoza 100 each 3    levocetirizine (XYZAL) 5 MG tablet Take 1 tablet by mouth daily 90 tablet 0    fenofibrate (TRICOR) 145 MG tablet Take 1 tablet by mouth daily 90 tablet 1    zolpidem (AMBIEN) 10 MG tablet Take 1 tablet by mouth nightly as needed for Sleep for up to 90 days. Max Daily Amount: 10 mg 30 tablet 2    ARIPiprazole (ABILIFY) 2 MG tablet Take 1 tablet by mouth daily 90 tablet 1    glipiZIDE (GLUCOTROL) 5 MG tablet       metFORMIN (GLUCOPHAGE-XR) 500 MG extended release tablet in the morning and at bedtime      norethindrone (MICRONOR) 0.35 MG tablet Take 1 tablet by mouth daily      Dulaglutide 4.5 MG/0.5ML SOPN Inject 4.5 mg into the skin once a week 12 Adjustable Dose Pre-filled Pen Syringe 3    DULoxetine (CYMBALTA) 30 MG extended release capsule Take 1 capsule by mouth daily 90 capsule 3    atorvastatin (LIPITOR) 40 MG tablet Take 1 tablet by mouth daily 90 tablet 3    Continuous Blood Gluc Sensor (FREESTYLE SANDY 2 SENSOR) [facility] Check glucose twice a day and as needed. DX E11.65 2 each 12    omeprazole (PRILOSEC) 20 MG delayed release capsule Take 1 capsule by mouth daily 30 capsule 2

## 2024-09-16 DIAGNOSIS — F51.01 PRIMARY INSOMNIA: ICD-10-CM

## 2024-09-16 RX ORDER — ZOLPIDEM TARTRATE 10 MG/1
10 TABLET ORAL NIGHTLY PRN
Qty: 30 TABLET | Refills: 2 | Status: SHIPPED | OUTPATIENT
Start: 2024-09-16 | End: 2024-12-15

## 2024-09-16 RX ORDER — LEVOCETIRIZINE DIHYDROCHLORIDE 5 MG/1
5 TABLET, FILM COATED ORAL DAILY
Qty: 90 TABLET | Refills: 0 | Status: SHIPPED | OUTPATIENT
Start: 2024-09-16

## 2024-10-13 SDOH — ECONOMIC STABILITY: INCOME INSECURITY: HOW HARD IS IT FOR YOU TO PAY FOR THE VERY BASICS LIKE FOOD, HOUSING, MEDICAL CARE, AND HEATING?: PATIENT DECLINED

## 2024-10-13 SDOH — ECONOMIC STABILITY: FOOD INSECURITY: WITHIN THE PAST 12 MONTHS, THE FOOD YOU BOUGHT JUST DIDN'T LAST AND YOU DIDN'T HAVE MONEY TO GET MORE.: PATIENT DECLINED

## 2024-10-13 SDOH — ECONOMIC STABILITY: FOOD INSECURITY: WITHIN THE PAST 12 MONTHS, YOU WORRIED THAT YOUR FOOD WOULD RUN OUT BEFORE YOU GOT MONEY TO BUY MORE.: PATIENT DECLINED

## 2024-10-14 RX ORDER — FLUTICASONE PROPIONATE 50 MCG
2 SPRAY, SUSPENSION (ML) NASAL DAILY
Qty: 16 G | Refills: 1 | Status: SHIPPED | OUTPATIENT
Start: 2024-10-14

## 2024-10-16 ENCOUNTER — OFFICE VISIT (OUTPATIENT)
Age: 34
End: 2024-10-16
Payer: MEDICAID

## 2024-10-16 VITALS
OXYGEN SATURATION: 98 % | SYSTOLIC BLOOD PRESSURE: 104 MMHG | TEMPERATURE: 96.8 F | DIASTOLIC BLOOD PRESSURE: 80 MMHG | RESPIRATION RATE: 18 BRPM | HEART RATE: 101 BPM | WEIGHT: 249 LBS | BODY MASS INDEX: 41.48 KG/M2 | HEIGHT: 65 IN

## 2024-10-16 DIAGNOSIS — H60.502 ACUTE OTITIS EXTERNA OF LEFT EAR, UNSPECIFIED TYPE: Primary | ICD-10-CM

## 2024-10-16 PROCEDURE — 99213 OFFICE O/P EST LOW 20 MIN: CPT | Performed by: NURSE PRACTITIONER

## 2024-10-16 RX ORDER — NEOMYCIN SULFATE, POLYMYXIN B SULFATE AND HYDROCORTISONE 10; 3.5; 1 MG/ML; MG/ML; [USP'U]/ML
3 SUSPENSION/ DROPS AURICULAR (OTIC) 2 TIMES DAILY
Qty: 10 ML | Refills: 0 | Status: SHIPPED | OUTPATIENT
Start: 2024-10-16 | End: 2024-11-05

## 2024-10-16 ASSESSMENT — PATIENT HEALTH QUESTIONNAIRE - PHQ9
9. THOUGHTS THAT YOU WOULD BE BETTER OFF DEAD, OR OF HURTING YOURSELF: NOT AT ALL
8. MOVING OR SPEAKING SO SLOWLY THAT OTHER PEOPLE COULD HAVE NOTICED. OR THE OPPOSITE, BEING SO FIGETY OR RESTLESS THAT YOU HAVE BEEN MOVING AROUND A LOT MORE THAN USUAL: NOT AT ALL
SUM OF ALL RESPONSES TO PHQ QUESTIONS 1-9: 0
SUM OF ALL RESPONSES TO PHQ9 QUESTIONS 1 & 2: 0
2. FEELING DOWN, DEPRESSED OR HOPELESS: NOT AT ALL
1. LITTLE INTEREST OR PLEASURE IN DOING THINGS: NOT AT ALL
SUM OF ALL RESPONSES TO PHQ QUESTIONS 1-9: 0
5. POOR APPETITE OR OVEREATING: NOT AT ALL
6. FEELING BAD ABOUT YOURSELF - OR THAT YOU ARE A FAILURE OR HAVE LET YOURSELF OR YOUR FAMILY DOWN: NOT AT ALL
4. FEELING TIRED OR HAVING LITTLE ENERGY: NOT AT ALL
3. TROUBLE FALLING OR STAYING ASLEEP: NOT AT ALL
7. TROUBLE CONCENTRATING ON THINGS, SUCH AS READING THE NEWSPAPER OR WATCHING TELEVISION: NOT AT ALL
SUM OF ALL RESPONSES TO PHQ QUESTIONS 1-9: 0
SUM OF ALL RESPONSES TO PHQ QUESTIONS 1-9: 0
10. IF YOU CHECKED OFF ANY PROBLEMS, HOW DIFFICULT HAVE THESE PROBLEMS MADE IT FOR YOU TO DO YOUR WORK, TAKE CARE OF THINGS AT HOME, OR GET ALONG WITH OTHER PEOPLE: NOT DIFFICULT AT ALL

## 2024-10-16 NOTE — PROGRESS NOTES
\"Have you been to the ER, urgent care clinic since your last visit?  Hospitalized since your last visit?\"    NO    “Have you seen or consulted any other health care providers outside of [facility] since your last visit?”    NO            Click Here for Release of Records Request      Chief Complaint   Patient presents with    Diabetes    Otalgia     right         Vitals:    10/16/24 1544   BP: 104/80   Pulse: (!) 101   Resp: 18   Temp: 96.8 °F (36 °C)   SpO2: 98%

## 2024-10-16 NOTE — PROGRESS NOTES
\"Have you been to the ER, urgent care clinic since your last visit?  Hospitalized since your last visit?\"    NO    “Have you seen or consulted any other health care providers outside of [facility] since your last visit?”    NO            Click Here for Release of Records Request      Chief Complaint   Patient presents with    Diabetes    Otalgia     right         Vitals:    10/16/24 1544   BP: 104/80   Pulse: (!) 101   Resp: 18   Temp: 96.8 °F (36 °C)   SpO2: 98%

## 2024-10-29 ENCOUNTER — HOSPITAL ENCOUNTER (OUTPATIENT)
Facility: HOSPITAL | Age: 34
Discharge: HOME | End: 2024-11-01
Payer: MEDICAID

## 2024-10-29 DIAGNOSIS — T74.32XS VICTIM OF CHILDHOOD EMOTIONAL ABUSE, SEQUELA: ICD-10-CM

## 2024-10-29 DIAGNOSIS — F39 UNSPECIFIED MOOD (AFFECTIVE) DISORDER (HCC): ICD-10-CM

## 2024-10-29 DIAGNOSIS — G47.00 INSOMNIA DISORDER WITH NON-SLEEP DISORDER MENTAL COMORBIDITY: Primary | ICD-10-CM

## 2024-10-29 DIAGNOSIS — F41.8 MIXED ANXIETY AND DEPRESSIVE DISORDER: ICD-10-CM

## 2024-10-29 DIAGNOSIS — F90.2 ADHD (ATTENTION DEFICIT HYPERACTIVITY DISORDER), COMBINED TYPE: ICD-10-CM

## 2024-10-29 PROBLEM — T74.32XA VICTIM OF CHILDHOOD EMOTIONAL ABUSE: Status: ACTIVE | Noted: 2024-10-29

## 2024-10-29 PROCEDURE — 90792 PSYCH DIAG EVAL W/MED SRVCS: CPT | Performed by: MIDWIFE

## 2024-10-29 RX ORDER — DULOXETIN HYDROCHLORIDE 30 MG/1
30 CAPSULE, DELAYED RELEASE ORAL 2 TIMES DAILY
Qty: 60 CAPSULE | Refills: 0 | Status: SHIPPED | OUTPATIENT
Start: 2024-10-29 | End: 2024-11-28

## 2024-10-29 NOTE — BH NOTE
NP   DULoxetine (CYMBALTA) 30 MG extended release capsule Take 1 capsule by mouth daily 2/20/24   Marley Mathur APRN - NP   atorvastatin (LIPITOR) 40 MG tablet Take 1 tablet by mouth daily 2/6/24   Marley Mathur APRN - NP   Continuous Blood Gluc Sensor (FREESTYLE SANDY 2 SENSOR) MISC Check glucose twice a day and as needed. DX E11.65 1/31/24   Marley Mathur APRN - NP   omeprazole (PRILOSEC) 20 MG delayed release capsule Take 1 capsule by mouth daily 1/22/24   Marley Mathur APRN - NP   glucose monitoring kit Use to check blood sugar daily E11.65 10/13/23   Sylvia Manuel APRN - NP   Continuous Blood Gluc  (FREESTYLE SANDY READER) RICH Check glucose twice a day and as needed. DX E11.65 8/26/23   Marley Mathur APRN - NP   cetirizine (ZYRTEC) 10 MG tablet Take 1 tablet by mouth daily    Provider, MD Don   blood glucose monitor strips Use to check blood sugar daily E11.65 7/21/23   Tish Palomo MD   Lancets MISC Use to check blood sugar daily E11.65 7/21/23   Tish Palomo MD   albuterol sulfate HFA (PROVENTIL;VENTOLIN;PROAIR) 108 (90 Base) MCG/ACT inhaler Inhale 2 puffs into the lungs every 4 hours as needed 7/7/21   Automatic Reconciliation, Ar   Side effects from medications: none identified    Allergies: No Known Allergies    Psychosocial History: Parents were together, one younger sister. Has some happy memories of childhood but feels she has limited memories of that time overall.    Family Psychiatric History:   Severe anxiety and depression: mother, sister, maternal grandmother  Bipolar: none  Schizophrenia: none  Alcohol use disorder: father, paternal grandfather  Substance use disorder: paternal uncle  Suicide: none    Mental Status Examination     I. Reliability in Providing Information: Good      II. Personal Presentation: Looks stated age; dressed casually     III. Motor Activity: Fidgeting, foot/leg tapping      IV. Speech Pattern:  Normal      V. Mood: Depressed,

## 2024-10-30 ENCOUNTER — TELEPHONE (OUTPATIENT)
Age: 34
End: 2024-10-30

## 2024-10-30 NOTE — TELEPHONE ENCOUNTER
Returned call to patient. Informed a referral is needed prior to scheduling. Patient states she will contact her referring provider and have them enter a referral. When received the patient will be contacted to schedule.

## 2024-11-06 DIAGNOSIS — F90.2 ADHD (ATTENTION DEFICIT HYPERACTIVITY DISORDER), COMBINED TYPE: ICD-10-CM

## 2024-11-06 DIAGNOSIS — T74.32XS VICTIM OF CHILDHOOD EMOTIONAL ABUSE, SEQUELA: ICD-10-CM

## 2024-11-06 DIAGNOSIS — F33.41 RECURRENT MAJOR DEPRESSIVE DISORDER, IN PARTIAL REMISSION (HCC): ICD-10-CM

## 2024-11-06 DIAGNOSIS — F39 UNSPECIFIED MOOD (AFFECTIVE) DISORDER (HCC): Primary | ICD-10-CM

## 2024-11-06 DIAGNOSIS — F41.8 MIXED ANXIETY AND DEPRESSIVE DISORDER: ICD-10-CM

## 2024-11-06 NOTE — TELEPHONE ENCOUNTER
Patient states her provider, Milly Patricio, a psychiatrist faxed us a referral on October 31st. She is asking if that is enough information to schedule. Please call her back at . Thank you.

## 2024-11-06 NOTE — TELEPHONE ENCOUNTER
Returned call to patient. Informed that we did not receive a referral from Milly Patricio. Patient asked that I contact provider to explain what is needed.    Called and spoke to Milly Patricio. Informed a referral is needed prior to scheduling the patient. Asked if she can enter a referral in EPIC. She states she sent a direct message to Dr Martinez for review and will contact the help desk to assist with entering the referral. The patient will be contacted to schedule once the referral is received.

## 2024-11-11 NOTE — TELEPHONE ENCOUNTER
Patient would like a call back to confirm if the forms had been recvd. She can be reached at [phone] can leave vm

## 2024-11-12 NOTE — TELEPHONE ENCOUNTER
Returned call to patient. Informed we received her completed questionnaires. Patient thanked me for calling.

## 2024-12-02 ENCOUNTER — HOSPITAL ENCOUNTER (OUTPATIENT)
Facility: HOSPITAL | Age: 34
Discharge: HOME | End: 2024-12-05
Payer: MEDICAID

## 2024-12-02 DIAGNOSIS — F90.2 ADHD (ATTENTION DEFICIT HYPERACTIVITY DISORDER), COMBINED TYPE: ICD-10-CM

## 2024-12-02 DIAGNOSIS — F41.8 MIXED ANXIETY AND DEPRESSIVE DISORDER: Primary | ICD-10-CM

## 2024-12-02 DIAGNOSIS — G47.00 INSOMNIA DISORDER WITH NON-SLEEP DISORDER MENTAL COMORBIDITY: ICD-10-CM

## 2024-12-02 PROCEDURE — 99214 OFFICE O/P EST MOD 30 MIN: CPT | Performed by: MIDWIFE

## 2024-12-02 RX ORDER — DULOXETIN HYDROCHLORIDE 20 MG/1
40 CAPSULE, DELAYED RELEASE ORAL 2 TIMES DAILY
Qty: 120 CAPSULE | Refills: 0 | Status: SHIPPED | OUTPATIENT
Start: 2024-12-02 | End: 2025-01-01

## 2024-12-02 RX ORDER — DULOXETINE 40 MG/1
30 CAPSULE, DELAYED RELEASE ORAL 2 TIMES DAILY
Qty: 60 CAPSULE | Refills: 0 | Status: SHIPPED | OUTPATIENT
Start: 2024-12-02 | End: 2024-12-02 | Stop reason: HOSPADM

## 2024-12-02 RX ORDER — ARIPIPRAZOLE 5 MG/1
5 TABLET ORAL DAILY
Qty: 30 TABLET | Refills: 0 | Status: SHIPPED | OUTPATIENT
Start: 2024-12-02 | End: 2025-01-01

## 2024-12-02 NOTE — BH NOTE
[facility]  [facility]  Milly Patricio PMHNBIBI    Name: Carrie Aguilera        MRN:  [id number]     Follow up Medication Management Visit    Collateral: none    Patient Identification: Carrie is a 35 YO single  female. She works as a  in [place], sometimes part time, sometimes full time. She has one 9 YO son, Rohit who is in public school. They live with her mother, who is a .      Carrie is  a past patient of Rupa Wilson's here at [facility], sees PCP Marley Mathur, and is a patient of [facility] (Dr. Johnson, then ALVA Martínez).    Last Visit:     Clinical Formulation: Carrie has valid concerns about her impulsive and angry reaction to being attacked by a rooster, as in retrospect, she would rather have dealt with the bird differently due to the presence of her son and mother. She most likely suffers from an unspecified mood disorder which does not meet criteria for cyclothymia, bipolar II or bipolar I. She does have symptoms indicative of anxiety and depression, which may respond to titration of her duloxetine. After her anxiety and depression are successfully treated, she may need an increase in her aripiprazole to address anger and irritability. She may also benefit from treatment of symptoms of ADHD. Her sleep may improve with sleep hygiene practices. Therapy is recommended, although she is not amenable at this time.       Today:     CC: Patient presents for ongoing medication management of mood disorder, anxiety, depression, and insomnia.    HPI: Carrie feels she has been a little more motivated and a little less depressed since her medication changes. Is more in the holiday spirit this year than last, which has been positive. She has had several panic attacks, last of which was about 2 weeks ago. Her anxiety is about the same. She felt her medication adjustment last visit was not helpful for the

## 2024-12-04 ENCOUNTER — PATIENT MESSAGE (OUTPATIENT)
Age: 34
End: 2024-12-04

## 2024-12-05 RX ORDER — FENOFIBRATE 145 MG/1
145 TABLET, COATED ORAL DAILY
Qty: 90 TABLET | Refills: 1 | Status: SHIPPED | OUTPATIENT
Start: 2024-12-05

## 2024-12-05 RX ORDER — LEVOCETIRIZINE DIHYDROCHLORIDE 5 MG/1
5 TABLET, FILM COATED ORAL DAILY
Qty: 90 TABLET | Refills: 0 | Status: SHIPPED | OUTPATIENT
Start: 2024-12-05

## 2024-12-09 ENCOUNTER — TELEPHONE (OUTPATIENT)
Age: 34
End: 2024-12-09

## 2024-12-09 NOTE — TELEPHONE ENCOUNTER
Pt left message on [employer] that she want to reschedule her appt to further date. Lvm asking pt to call the office to reschedule her appt

## 2024-12-13 DIAGNOSIS — F51.01 PRIMARY INSOMNIA: ICD-10-CM

## 2024-12-16 RX ORDER — ZOLPIDEM TARTRATE 10 MG/1
10 TABLET ORAL NIGHTLY PRN
Qty: 30 TABLET | Refills: 0 | Status: SHIPPED | OUTPATIENT
Start: 2024-12-16 | End: 2025-01-15

## 2025-01-03 RX ORDER — ARIPIPRAZOLE 5 MG/1
5 TABLET ORAL DAILY
Qty: 30 TABLET | Refills: 0 | Status: SHIPPED | OUTPATIENT
Start: 2025-01-03 | End: 2025-02-02

## 2025-01-05 NOTE — BH NOTE
[facility]  [facility]  Milly Patricio, PMHNP     Name: Carrie Aguilera                                              MRN:  [id number]           Follow up Medication Management Visit     Collateral: Student Maria G Li     Patient Identification: Carrie is a 33 YO single  female. She works as a  in [place], sometimes part time, sometimes full time. She has one 9 YO son, Rohit who is in public school. They live with her mother, who is a .      Carrie is  a past patient of Rupa Wilson's here at [facility], sees PCP Marley Mathur, and is a patient of [facility] (Dr. Johnson, then ALVA Martínez).     Last Visit:     Clinical Formulation: Carrie has valid concerns about her impulsive and angry reaction to being attacked by a rooster, as in retrospect, she would rather have dealt with the bird differently due to the presence of her son and mother. She most likely suffers from an unspecified mood disorder which does not meet criteria for cyclothymia, bipolar II or bipolar I. She does have symptoms indicative of anxiety and depression, which may respond to titration of her duloxetine. After her anxiety and depression are successfully treated, she may need an increase in her aripiprazole to address anger and irritability. She may also benefit from treatment of symptoms of ADHD. Her sleep may improve with sleep hygiene practices. Therapy is recommended, although she is not amenable at this time.       Today:      CC: Patient presents for ongoing medication management of mood disorder, anxiety, depression, and insomnia.    Carrie Aguilera, who was evaluated through a patient-initiated, synchronous (real-time) audio only encounter, and/or her healthcare decision maker, is aware that it is a billable service, with coverage as determined by her insurance carrier. She provided verbal consent to proceed: Yes. She has

## 2025-01-06 ENCOUNTER — HOSPITAL ENCOUNTER (OUTPATIENT)
Facility: HOSPITAL | Age: 35
Discharge: HOME | End: 2025-01-09

## 2025-01-06 DIAGNOSIS — F39 UNSPECIFIED MOOD (AFFECTIVE) DISORDER (HCC): ICD-10-CM

## 2025-01-06 DIAGNOSIS — F41.8 MIXED ANXIETY AND DEPRESSIVE DISORDER: Primary | ICD-10-CM

## 2025-01-06 DIAGNOSIS — F90.2 ADHD (ATTENTION DEFICIT HYPERACTIVITY DISORDER), COMBINED TYPE: ICD-10-CM

## 2025-01-06 DIAGNOSIS — G47.00 INSOMNIA DISORDER WITH NON-SLEEP DISORDER MENTAL COMORBIDITY: ICD-10-CM

## 2025-01-06 RX ORDER — ARIPIPRAZOLE 10 MG/1
10 TABLET ORAL DAILY
Qty: 30 TABLET | Refills: 0 | Status: SHIPPED | OUTPATIENT
Start: 2025-01-06 | End: 2025-02-05

## 2025-01-06 RX ORDER — DULOXETIN HYDROCHLORIDE 20 MG/1
40 CAPSULE, DELAYED RELEASE ORAL NIGHTLY
Qty: 60 CAPSULE | Refills: 0 | Status: SHIPPED | OUTPATIENT
Start: 2025-01-06 | End: 2025-01-08 | Stop reason: SDUPTHER

## 2025-01-06 RX ORDER — DULOXETIN HYDROCHLORIDE 60 MG/1
60 CAPSULE, DELAYED RELEASE ORAL EVERY MORNING
Qty: 30 CAPSULE | Refills: 0 | Status: SHIPPED | OUTPATIENT
Start: 2025-01-06 | End: 2025-02-05

## 2025-01-08 ENCOUNTER — TELEMEDICINE (OUTPATIENT)
Age: 35
End: 2025-01-08

## 2025-01-08 DIAGNOSIS — R41.3 MEMORY LOSS: ICD-10-CM

## 2025-01-08 DIAGNOSIS — F41.9 ANXIETY: ICD-10-CM

## 2025-01-08 DIAGNOSIS — Z91.49 HISTORY OF PSYCHOLOGICAL TRAUMA: ICD-10-CM

## 2025-01-08 DIAGNOSIS — Z86.59 HISTORY OF PANIC ATTACKS: ICD-10-CM

## 2025-01-08 DIAGNOSIS — R45.89 SYMPTOMS OF DEPRESSION: ICD-10-CM

## 2025-01-08 DIAGNOSIS — Z62.9 HISTORY OF ADVERSE CHILDHOOD EXPERIENCES: ICD-10-CM

## 2025-01-08 DIAGNOSIS — R47.89 WORD FINDING DIFFICULTY: ICD-10-CM

## 2025-01-08 DIAGNOSIS — Z65.8 PSYCHOSOCIAL DISTRESS: ICD-10-CM

## 2025-01-08 DIAGNOSIS — Z65.8 INADEQUATE SOCIAL SUPPORT: ICD-10-CM

## 2025-01-08 DIAGNOSIS — R41.840 ATTENTION AND CONCENTRATION DEFICIT: Primary | ICD-10-CM

## 2025-01-08 RX ORDER — DULOXETIN HYDROCHLORIDE 20 MG/1
40 CAPSULE, DELAYED RELEASE ORAL NIGHTLY
Qty: 60 CAPSULE | Refills: 0 | Status: SHIPPED | OUTPATIENT
Start: 2025-01-08 | End: 2025-02-07

## 2025-01-08 RX ORDER — FLUTICASONE PROPIONATE 50 MCG
2 SPRAY, SUSPENSION (ML) NASAL DAILY
Qty: 16 G | Refills: 1 | Status: SHIPPED | OUTPATIENT
Start: 2025-01-08

## 2025-01-08 SDOH — HEALTH STABILITY - MENTAL HEALTH: PROBLEM RELATED TO UPBRINGING, UNSPECIFIED: Z62.9

## 2025-01-08 NOTE — PROGRESS NOTES
zolpidem (AMBIEN) 10 MG tablet Take 1 tablet by mouth nightly as needed for Sleep for up to 30 days. for sleep Max Daily Amount: 10 mg 30 tablet 0    fenofibrate (TRICOR) 145 MG tablet Take 1 tablet by mouth daily 90 tablet 1    levocetirizine (XYZAL) 5 MG tablet Take 1 tablet by mouth daily 90 tablet 0    fluticasone (FLONASE) 50 MCG/ACT nasal spray 2 sprays by Nasal route daily 16 g 1    Insulin Pen Needle ([employer] PEN NEEDLES) 31G X 6 MM MISC 1 each by Does not apply route daily To administer Victoza 100 each 3    glipiZIDE (GLUCOTROL) 5 MG tablet       metFORMIN (GLUCOPHAGE-XR) 500 MG extended release tablet in the morning and at bedtime      norethindrone (MICRONOR) 0.35 MG tablet Take 1 tablet by mouth daily      Dulaglutide 4.5 MG/0.5ML SOPN Inject 4.5 mg into the skin once a week (Patient not taking: Reported on 10/29/2024) 12 Adjustable Dose Pre-filled Pen Syringe 3    atorvastatin (LIPITOR) 40 MG tablet Take 1 tablet by mouth daily 90 tablet 3    Continuous Blood Gluc Sensor (FREESTYLE SANDY 2 SENSOR) MISC Check glucose twice a day and as needed. DX E11.65 2 each 12    omeprazole (PRILOSEC) 20 MG delayed release capsule Take 1 capsule by mouth daily 30 capsule 2    glucose monitoring kit Use to check blood sugar daily E11.65 1 kit 0    Continuous Blood Gluc  (FREESTYLE SANDY READER) RICH Check glucose twice a day and as needed. DX E11.65 1 each 0    cetirizine (ZYRTEC) 10 MG tablet Take 1 tablet by mouth daily      blood glucose monitor strips Use to check blood sugar daily E11.65 100 strip 3    Lancets MISC Use to check blood sugar daily E11.65 100 each 3    albuterol sulfate HFA (PROVENTIL;VENTOLIN;PROAIR) 108 (90 Base) MCG/ACT inhaler Inhale 2 puffs into the lungs every 4 hours as needed       No current facility-administered medications for this visit.          Past Medical History:   Diagnosis Date    ADD (attention deficit disorder)     Bronchial spasms     Celiac disease 05/2019    Celiac

## 2025-01-12 DIAGNOSIS — F51.01 PRIMARY INSOMNIA: ICD-10-CM

## 2025-01-14 RX ORDER — ZOLPIDEM TARTRATE 10 MG/1
TABLET ORAL
Qty: 30 TABLET | Refills: 2 | Status: SHIPPED | OUTPATIENT
Start: 2025-01-14 | End: 2025-02-13

## 2025-01-29 ENCOUNTER — OFFICE VISIT (OUTPATIENT)
Age: 35
End: 2025-01-29
Payer: MEDICAID

## 2025-01-29 VITALS
WEIGHT: 249 LBS | HEART RATE: 98 BPM | DIASTOLIC BLOOD PRESSURE: 70 MMHG | TEMPERATURE: 97.2 F | RESPIRATION RATE: 18 BRPM | SYSTOLIC BLOOD PRESSURE: 110 MMHG | HEIGHT: 65 IN | BODY MASS INDEX: 41.48 KG/M2 | OXYGEN SATURATION: 98 %

## 2025-01-29 DIAGNOSIS — J06.9 UPPER RESPIRATORY INFECTION WITH COUGH AND CONGESTION: Primary | ICD-10-CM

## 2025-01-29 DIAGNOSIS — J02.9 SORE THROAT: ICD-10-CM

## 2025-01-29 LAB
EXP DATE SOLUTION: NORMAL
EXP DATE SWAB: NORMAL
EXPIRATION DATE: NORMAL
INFLUENZA A ANTIGEN, POC: NEGATIVE
INFLUENZA B ANTIGEN, POC: NEGATIVE
LOT NUMBER POC: NORMAL
LOT NUMBER SOLUTION: NORMAL
LOT NUMBER SWAB: NORMAL
RSV RNA, POC: NEGATIVE
SARS-COV-2 RNA, POC: NEGATIVE
STREP PYOGENES DNA, POC: NEGATIVE
VALID INTERNAL CONTROL, POC: YES

## 2025-01-29 PROCEDURE — 87635 SARS-COV-2 COVID-19 AMP PRB: CPT | Performed by: NURSE PRACTITIONER

## 2025-01-29 PROCEDURE — 99213 OFFICE O/P EST LOW 20 MIN: CPT | Performed by: NURSE PRACTITIONER

## 2025-01-29 PROCEDURE — 87634 RSV DNA/RNA AMP PROBE: CPT | Performed by: NURSE PRACTITIONER

## 2025-01-29 PROCEDURE — 87651 STREP A DNA AMP PROBE: CPT | Performed by: NURSE PRACTITIONER

## 2025-01-29 PROCEDURE — 87502 INFLUENZA DNA AMP PROBE: CPT | Performed by: NURSE PRACTITIONER

## 2025-01-29 RX ORDER — BROMPHENIRAMINE MALEATE, PSEUDOEPHEDRINE HYDROCHLORIDE, AND DEXTROMETHORPHAN HYDROBROMIDE 2; 30; 10 MG/5ML; MG/5ML; MG/5ML
10 SYRUP ORAL 4 TIMES DAILY PRN
Qty: 118 ML | Refills: 0 | Status: SHIPPED | OUTPATIENT
Start: 2025-01-29

## 2025-01-29 RX ORDER — ARIPIPRAZOLE 10 MG/1
10 TABLET ORAL DAILY
Qty: 30 TABLET | Refills: 2 | Status: SHIPPED | OUTPATIENT
Start: 2025-01-29 | End: 2025-04-29

## 2025-01-29 SDOH — ECONOMIC STABILITY: FOOD INSECURITY: WITHIN THE PAST 12 MONTHS, YOU WORRIED THAT YOUR FOOD WOULD RUN OUT BEFORE YOU GOT MONEY TO BUY MORE.: NEVER TRUE

## 2025-01-29 SDOH — ECONOMIC STABILITY: FOOD INSECURITY: WITHIN THE PAST 12 MONTHS, THE FOOD YOU BOUGHT JUST DIDN'T LAST AND YOU DIDN'T HAVE MONEY TO GET MORE.: NEVER TRUE

## 2025-01-29 SDOH — ECONOMIC STABILITY: INCOME INSECURITY: IN THE LAST 12 MONTHS, WAS THERE A TIME WHEN YOU WERE NOT ABLE TO PAY THE MORTGAGE OR RENT ON TIME?: NO

## 2025-01-29 ASSESSMENT — PATIENT HEALTH QUESTIONNAIRE - PHQ9
7. TROUBLE CONCENTRATING ON THINGS, SUCH AS READING THE NEWSPAPER OR WATCHING TELEVISION: NOT AT ALL
SUM OF ALL RESPONSES TO PHQ QUESTIONS 1-9: 0
5. POOR APPETITE OR OVEREATING: NOT AT ALL
SUM OF ALL RESPONSES TO PHQ9 QUESTIONS 1 & 2: 0
4. FEELING TIRED OR HAVING LITTLE ENERGY: NOT AT ALL
6. FEELING BAD ABOUT YOURSELF - OR THAT YOU ARE A FAILURE OR HAVE LET YOURSELF OR YOUR FAMILY DOWN: NOT AT ALL
3. TROUBLE FALLING OR STAYING ASLEEP: NOT AT ALL
2. FEELING DOWN, DEPRESSED OR HOPELESS: NOT AT ALL
SUM OF ALL RESPONSES TO PHQ QUESTIONS 1-9: 0
SUM OF ALL RESPONSES TO PHQ QUESTIONS 1-9: 0
8. MOVING OR SPEAKING SO SLOWLY THAT OTHER PEOPLE COULD HAVE NOTICED. OR THE OPPOSITE, BEING SO FIGETY OR RESTLESS THAT YOU HAVE BEEN MOVING AROUND A LOT MORE THAN USUAL: NOT AT ALL
SUM OF ALL RESPONSES TO PHQ QUESTIONS 1-9: 0
9. THOUGHTS THAT YOU WOULD BE BETTER OFF DEAD, OR OF HURTING YOURSELF: NOT AT ALL
1. LITTLE INTEREST OR PLEASURE IN DOING THINGS: NOT AT ALL
10. IF YOU CHECKED OFF ANY PROBLEMS, HOW DIFFICULT HAVE THESE PROBLEMS MADE IT FOR YOU TO DO YOUR WORK, TAKE CARE OF THINGS AT HOME, OR GET ALONG WITH OTHER PEOPLE: NOT DIFFICULT AT ALL

## 2025-01-29 NOTE — PROGRESS NOTES
\"Have you been to the ER, urgent care clinic since your last visit?  Hospitalized since your last visit?\"    NO    “Have you seen or consulted any other health care providers outside of [facility] since your last visit?”    NO            Click Here for Release of Records Request      Chief Complaint   Patient presents with    Pharyngitis         Vitals:    01/29/25 0729   BP: 110/70   Pulse: 98   Resp: 18   Temp: 97.2 °F (36.2 °C)   SpO2: 98%     
DX E11.65, Disp: 1 each, Rfl: 0    cetirizine (ZYRTEC) 10 MG tablet, Take 1 tablet by mouth daily, Disp: , Rfl:     blood glucose monitor strips, Use to check blood sugar daily E11.65, Disp: 100 strip, Rfl: 3    Lancets MISC, Use to check blood sugar daily E11.65, Disp: 100 each, Rfl: 3    albuterol sulfate HFA (PROVENTIL;VENTOLIN;PROAIR) 108 (90 Base) MCG/ACT inhaler, Inhale 2 puffs into the lungs every 4 hours as needed, Disp: , Rfl:     ARIPiprazole (ABILIFY) 10 MG tablet, Take 1 tablet by mouth daily, Disp: 30 tablet, Rfl: 2    Past Medical History:   Diagnosis Date    ADD (attention deficit disorder)     Bronchial spasms     Celiac disease 05/2019    Celiac disease     Depression 5/9/2019    Diabetes mellitus (HCC)     Fatty liver     Fracture, ankle 06/14/2020    right    GERD (gastroesophageal reflux disease)     Herpes simplex virus (HSV) infection     Hx of herpes simplex infection 05/13/2009    lips    Panic     Sleep apnea     USES cpap    Type 2 diabetes mellitus without complication, without long-term current use of insulin (HCC) 09/26/2023       Past Surgical History:   Procedure Laterality Date    LIVER BIOPSY N/A 8/9/2023    LIVER BIOPSY ULTRASOUND performed by Salvador Kent MD at [facility] ENDOSCOPY    OTHER SURGICAL HISTORY  05/2019    upper endoscopy with biopsy    OTHER SURGICAL HISTORY  07/09/2021    INGROWN TOENAIL SURGERY       Family History   Problem Relation Age of Onset    Hypertension Paternal Grandmother     Lung Disease Paternal Grandmother     Heart Disease Paternal Grandmother     Migraines Maternal Grandmother     Asthma Sister     Hypertension Paternal Grandfather     Depression Mother         postpartum    Alcohol Abuse Father     Drug Abuse Maternal Uncle     Cancer Maternal Grandfather         lungs    Anxiety Disorder Mother          Objective:     /70 (Site: Left Upper Arm, Position: Sitting, Cuff Size: Large Adult)   Pulse 98   Temp 97.2 °F (36.2 °C) (Temporal)

## 2025-02-05 RX ORDER — DULOXETIN HYDROCHLORIDE 60 MG/1
60 CAPSULE, DELAYED RELEASE ORAL EVERY MORNING
Qty: 30 CAPSULE | Refills: 0 | Status: SHIPPED | OUTPATIENT
Start: 2025-02-05 | End: 2025-03-07

## 2025-02-05 RX ORDER — DULOXETIN HYDROCHLORIDE 20 MG/1
40 CAPSULE, DELAYED RELEASE ORAL NIGHTLY
Qty: 60 CAPSULE | Refills: 0 | Status: SHIPPED | OUTPATIENT
Start: 2025-02-05 | End: 2025-03-07

## 2025-02-10 ENCOUNTER — HOSPITAL ENCOUNTER (OUTPATIENT)
Facility: HOSPITAL | Age: 35
Discharge: HOME | End: 2025-02-13
Payer: MEDICAID

## 2025-02-10 DIAGNOSIS — G47.00 INSOMNIA DISORDER WITH NON-SLEEP DISORDER MENTAL COMORBIDITY: ICD-10-CM

## 2025-02-10 DIAGNOSIS — F39 UNSPECIFIED MOOD (AFFECTIVE) DISORDER (HCC): ICD-10-CM

## 2025-02-10 DIAGNOSIS — F41.8 MIXED ANXIETY AND DEPRESSIVE DISORDER: Primary | ICD-10-CM

## 2025-02-10 DIAGNOSIS — F90.2 ADHD (ATTENTION DEFICIT HYPERACTIVITY DISORDER), COMBINED TYPE: ICD-10-CM

## 2025-02-10 PROCEDURE — 99214 OFFICE O/P EST MOD 30 MIN: CPT | Performed by: MIDWIFE

## 2025-02-10 RX ORDER — ATORVASTATIN CALCIUM 40 MG/1
40 TABLET, FILM COATED ORAL DAILY
Qty: 30 TABLET | Refills: 0 | Status: SHIPPED | OUTPATIENT
Start: 2025-02-10

## 2025-02-10 NOTE — BH NOTE
[facility]  [facility]  Milly Patricio PMHNBIBI     Name: Carrie Aguilera                                              MRN:  [id number]           Follow up Medication Management Visit     Collateral: none     Patient Identification: Carrie is a 35 YO single  female. She works as a  in [place], sometimes part time, sometimes full time. She has one 7 YO son, Rohit who is in public school. They live with her mother, who is a .      Carrie is  a past patient of Rupa Wilson's here at [facility], sees PCP Marley Mathur, and is a patient of [facility] (Dr. Johnson, then ALVA Martínez).     Last Visit:     Clinical Formulation: Carrie has valid concerns about her impulsive and angry reaction to being attacked by a rooster, as in retrospect, she would rather have dealt with the bird differently due to the presence of her son and mother. She most likely suffers from an unspecified mood disorder which does not meet criteria for cyclothymia, bipolar II or bipolar I. She does have symptoms indicative of anxiety and depression, which may respond to titration of her duloxetine. After her anxiety and depression are successfully treated, she may need an increase in her aripiprazole to address anger and irritability. She may also benefit from treatment of symptoms of ADHD. Her sleep may improve with sleep hygiene practices. Therapy is recommended, although she is not amenable at this time.       Today:      CC: Patient presents for ongoing medication management of mood disorder, anxiety, depression, and insomnia.     HPI: Carrie has been more anxious and depressed lately due to a new financial situation. She was issued two warrants for debt and has court on 2/24 and 3/24. She has a tax refund coming and should have funds in time but is worried they will be late which could cause a wage garnishment.     She has not had undue

## 2025-02-24 ENCOUNTER — TELEPHONE (OUTPATIENT)
Dept: ADMINISTRATIVE | Age: 35
End: 2025-02-24

## 2025-02-24 NOTE — TELEPHONE ENCOUNTER
Patient calling to reschedule her psych testing that is on for 3/7. She states she will be unavialble from 1230pm today to 3pm.

## 2025-03-03 RX ORDER — DULOXETIN HYDROCHLORIDE 60 MG/1
60 CAPSULE, DELAYED RELEASE ORAL EVERY MORNING
Qty: 30 CAPSULE | Refills: 0 | Status: SHIPPED | OUTPATIENT
Start: 2025-03-03 | End: 2025-04-02

## 2025-03-03 RX ORDER — DULOXETIN HYDROCHLORIDE 20 MG/1
40 CAPSULE, DELAYED RELEASE ORAL NIGHTLY
Qty: 60 CAPSULE | Refills: 0 | Status: SHIPPED | OUTPATIENT
Start: 2025-03-03 | End: 2025-04-02

## 2025-03-06 ENCOUNTER — TELEPHONE (OUTPATIENT)
Age: 35
End: 2025-03-06

## 2025-03-06 NOTE — TELEPHONE ENCOUNTER
Patient left voicemail and sent [employer] message about needing supplies. Called patient and left voicemail that both messages were received and she is already scheduled for the follow up visit that is needed and if she has anymore questions to feel free to call the office.

## 2025-03-11 ENCOUNTER — HOSPITAL ENCOUNTER (OUTPATIENT)
Facility: HOSPITAL | Age: 35
Discharge: HOME | End: 2025-03-14

## 2025-03-11 DIAGNOSIS — F41.1 GAD (GENERALIZED ANXIETY DISORDER): Primary | ICD-10-CM

## 2025-03-11 DIAGNOSIS — F51.01 PRIMARY INSOMNIA: ICD-10-CM

## 2025-03-11 DIAGNOSIS — F33.1 MODERATE EPISODE OF RECURRENT MAJOR DEPRESSIVE DISORDER (HCC): ICD-10-CM

## 2025-03-11 PROBLEM — F32.1 CURRENT MODERATE EPISODE OF MAJOR DEPRESSIVE DISORDER (HCC): Status: ACTIVE | Noted: 2025-03-11

## 2025-03-11 PROBLEM — F33.41 RECURRENT MAJOR DEPRESSIVE DISORDER, IN PARTIAL REMISSION: Status: RESOLVED | Noted: 2019-05-09 | Resolved: 2025-03-11

## 2025-03-11 RX ORDER — DULOXETIN HYDROCHLORIDE 60 MG/1
60 CAPSULE, DELAYED RELEASE ORAL 2 TIMES DAILY
Qty: 60 CAPSULE | Refills: 0 | Status: SHIPPED | OUTPATIENT
Start: 2025-03-11 | End: 2025-04-10

## 2025-03-11 RX ORDER — ZOLPIDEM TARTRATE 10 MG/1
TABLET ORAL
Qty: 30 TABLET | Refills: 2 | OUTPATIENT
Start: 2025-03-11 | End: 2025-04-10

## 2025-03-11 NOTE — BH NOTE
[facility]  [facility]  Milly Patricio PMHNP     Name: Carrie Aguilera                                              MRN:  [id number]           Follow up Medication Management Visit     Collateral: none     Patient Identification: Carrie is a 35 YO single  female. She works as a  in [place], sometimes part time, sometimes full time. She has one 7 YO son, Rohit who is in public school. They live with her mother, who is a . Four cats, one dog, ducks, chickens, fish tanks.      Carrie is  a past patient of Rupa Wilson's here at [facility], sees PCP Marley Mathur, and [facility], then [facility] Merly Martínez).     Last Visit:     Clinical Formulation: Carrie has valid concerns about her impulsive and angry reaction to being attacked by a rooster, as in retrospect, she would rather have dealt with the bird differently due to the presence of her son and mother. She most likely suffers from an unspecified mood disorder which does not meet criteria for cyclothymia, bipolar II or bipolar I. She does have symptoms indicative of anxiety and depression, which may respond to titration of her duloxetine. After her anxiety and depression are successfully treated, she may need an increase in her aripiprazole to address anger and irritability. She may also benefit from treatment of symptoms of ADHD. Her sleep may improve with sleep hygiene practices. Therapy is recommended, although she is not amenable at this time.       Today:      CC: Patient presents for ongoing medication management of mood disorder, anxiety, depression, and insomnia.     HPI: Carrie remains depression and anxiety, which never stops. The motivation to go to work is \"just not there\" even though she needs the money. She took a personal day today.    Sleeping well 6-8 hours; she feels achy when she wakes up at about 6; this may last

## 2025-03-14 ENCOUNTER — PROCEDURE VISIT (OUTPATIENT)
Age: 35
End: 2025-03-14

## 2025-03-14 ENCOUNTER — OFFICE VISIT (OUTPATIENT)
Age: 35
End: 2025-03-14
Payer: MEDICAID

## 2025-03-14 DIAGNOSIS — Z86.59 HISTORY OF PANIC ATTACKS: ICD-10-CM

## 2025-03-14 DIAGNOSIS — R41.840 ATTENTION AND CONCENTRATION DEFICIT: Primary | ICD-10-CM

## 2025-03-14 DIAGNOSIS — F33.1 MODERATE EPISODE OF RECURRENT MAJOR DEPRESSIVE DISORDER (HCC): ICD-10-CM

## 2025-03-14 DIAGNOSIS — F43.10 PTSD (POST-TRAUMATIC STRESS DISORDER): ICD-10-CM

## 2025-03-14 DIAGNOSIS — F41.1 GAD (GENERALIZED ANXIETY DISORDER): ICD-10-CM

## 2025-03-14 DIAGNOSIS — Z65.8 PSYCHOSOCIAL DISTRESS: ICD-10-CM

## 2025-03-14 DIAGNOSIS — Z65.8 INADEQUATE SOCIAL SUPPORT: ICD-10-CM

## 2025-03-14 DIAGNOSIS — R41.3 MEMORY DIFFICULTIES: Primary | ICD-10-CM

## 2025-03-14 DIAGNOSIS — Z62.9 HISTORY OF ADVERSE CHILDHOOD EXPERIENCES: ICD-10-CM

## 2025-03-14 PROCEDURE — 96136 PSYCL/NRPSYC TST PHY/QHP 1ST: CPT | Performed by: PSYCHOLOGIST

## 2025-03-14 PROCEDURE — 96130 PSYCL TST EVAL PHYS/QHP 1ST: CPT | Performed by: PSYCHOLOGIST

## 2025-03-14 PROCEDURE — 96138 PSYCL/NRPSYC TECH 1ST: CPT | Performed by: PSYCHOLOGIST

## 2025-03-14 PROCEDURE — 96139 PSYCL/NRPSYC TST TECH EA: CPT | Performed by: PSYCHOLOGIST

## 2025-03-14 PROCEDURE — 96131 PSYCL TST EVAL PHYS/QHP EA: CPT | Performed by: PSYCHOLOGIST

## 2025-03-14 SDOH — HEALTH STABILITY - MENTAL HEALTH: PROBLEM RELATED TO UPBRINGING, UNSPECIFIED: Z62.9

## 2025-03-17 RX ORDER — ATORVASTATIN CALCIUM 40 MG/1
40 TABLET, FILM COATED ORAL DAILY
Qty: 30 TABLET | Refills: 0 | Status: SHIPPED | OUTPATIENT
Start: 2025-03-17

## 2025-03-19 NOTE — PROGRESS NOTES
time, carefully labeling or color-coding each task in sequential or preferential order   Provide individualized/specialized training to help the employee learn techniques for multi-tasking (e.g., typing on a computer while talking on the phone)   Identify tasks that must be performed simultaneously and tasks that can be performed individually   Provide specific feedback to help the employee target areas of improvement   Remove or reduce distractions from work area   Supply ergonomic equipment to facilitate multi-tasking   Clearly represent performance standards such as completion time or accuracy rates   Paperwork:   Automate paperwork by creating electronic files when possible   Use speech recognition software to enter text or data into electronic files   Save time filling out paper forms by completing information in advance, using pre-filled forms, or adhering pre-printed stickers  Use checklists in place of writing text   Provide templates of letters or e-mails  Color-code forms for easy identification   Re-design commonly used forms   Use large font   Double space or triple space   Provide adequate space for hand-written response         MEMORY SPECIFIC RECOMMENDATIONS:  General Guidelines to Improve Memory:  Pay attention: You cannot remember something if you never learned it and you cannot learn something if you don't pay enough attention to it.  It takes intense focus to process information through the hippocampus.  Therefore, avoid multitasking and  receive information in a quiet, uninterrupted environment.  Tailor information acquisition to your learning style whether that be visual or auditory.  Involve as many senses as possible.  For example, if you are a visual learner, read out loud what you want to remember.  Or, relate information to colors textures smells and tastes.  The physical active rewriting information can also help your brain encoded.  Relate new information to information that you already

## 2025-03-20 DIAGNOSIS — F51.01 PRIMARY INSOMNIA: ICD-10-CM

## 2025-03-20 RX ORDER — ZOLPIDEM TARTRATE 10 MG/1
5 TABLET ORAL NIGHTLY PRN
Qty: 30 TABLET | Refills: 0 | Status: SHIPPED | OUTPATIENT
Start: 2025-03-20 | End: 2025-05-19

## 2025-03-24 DIAGNOSIS — F51.01 PRIMARY INSOMNIA: ICD-10-CM

## 2025-03-25 RX ORDER — ZOLPIDEM TARTRATE 10 MG/1
TABLET ORAL
Qty: 30 TABLET | Refills: 2 | OUTPATIENT
Start: 2025-03-25 | End: 2025-04-24

## 2025-03-27 ENCOUNTER — TELEPHONE (OUTPATIENT)
Age: 35
End: 2025-03-27

## 2025-03-27 NOTE — TELEPHONE ENCOUNTER
Left a voicemail message regarding an updated download for patient's upcoming virtual visit scheduled on 04/03/25. Her device is needed for an updated download.

## 2025-03-31 RX ORDER — DULOXETIN HYDROCHLORIDE 60 MG/1
60 CAPSULE, DELAYED RELEASE ORAL 2 TIMES DAILY
Qty: 60 CAPSULE | Refills: 2 | Status: SHIPPED | OUTPATIENT
Start: 2025-03-31 | End: 2025-06-29

## 2025-03-31 NOTE — PROGRESS NOTES
Interactive Psychotherapy/office feedback        Interactive office feedback session with Ms. Aguilera.  I reviewed the results of the recent Neuropsychological Evaluation  including the observed areas of neurocognitive strengths and weaknesses. Education was provided regarding my diagnostic impressions, and treatment plan/options were discussed. I also answered numerous questions related to the clinical findings, including the various methods to improve cognition and mood. CBT, psychoeducation, and supportive psychotherapy techniques were utilized.     Patient's report placed in [facility] per patient request.         Prior to seeing the patient I reviewed the records, including the previously completed report, the records in [facility], and any updated visits from other providers since I saw the patient last.       Diagnoses:      Memory Difficulties  R/O ADHD  PTSD  Major Depressive Disorder, moderate R/O Bipolar II Disorder  Generalized Anxiety Disorder, provisional  History of Panic Attacks  Psychosocial Distress  Inadequate Social Support  History of Adverse Childhood Experiences    The patient will follow up with the referring provider, and reported being very pleased with the services provided.  Follow up with [facility] prn.         [address]35 psychotherapy 30 Minutes      This note was created using voice recognition software. Despite editing, there may be syntax errors.         Carrie Aguilera, was evaluated through a synchronous (real-time) audio-video encounter. The patient (or guardian if applicable) is aware that this is a billable service, which includes applicable co-pays. This Virtual Visit was conducted with patient's (and/or legal guardian's) consent. Patient identification was verified, and a caregiver was present when appropriate.   The patient was located at Home: [address] [place] [id number]  Provider was located at Home (Appt [place] State[place]  Confirm you are appropriately licensed,

## 2025-04-01 ENCOUNTER — TELEMEDICINE (OUTPATIENT)
Age: 35
End: 2025-04-01

## 2025-04-01 DIAGNOSIS — F41.1 GAD (GENERALIZED ANXIETY DISORDER): ICD-10-CM

## 2025-04-01 DIAGNOSIS — R41.3 MEMORY DIFFICULTIES: Primary | ICD-10-CM

## 2025-04-01 DIAGNOSIS — F43.10 PTSD (POST-TRAUMATIC STRESS DISORDER): ICD-10-CM

## 2025-04-01 DIAGNOSIS — Z65.8 PSYCHOSOCIAL DISTRESS: ICD-10-CM

## 2025-04-01 DIAGNOSIS — Z62.9 HISTORY OF ADVERSE CHILDHOOD EXPERIENCES: ICD-10-CM

## 2025-04-01 DIAGNOSIS — Z86.59 HISTORY OF PANIC ATTACKS: ICD-10-CM

## 2025-04-01 DIAGNOSIS — Z65.8 INADEQUATE SOCIAL SUPPORT: ICD-10-CM

## 2025-04-01 DIAGNOSIS — F33.1 MODERATE EPISODE OF RECURRENT MAJOR DEPRESSIVE DISORDER (HCC): ICD-10-CM

## 2025-04-01 SDOH — HEALTH STABILITY - MENTAL HEALTH: PROBLEM RELATED TO UPBRINGING, UNSPECIFIED: Z62.9

## 2025-04-02 ASSESSMENT — SLEEP AND FATIGUE QUESTIONNAIRES
HOW LIKELY ARE YOU TO NOD OFF OR FALL ASLEEP WHILE WATCHING TV: SLIGHT CHANCE OF DOZING
HOW LIKELY ARE YOU TO NOD OFF OR FALL ASLEEP WHILE SITTING AND READING: HIGH CHANCE OF DOZING
ESS TOTAL SCORE: 16
HOW LIKELY ARE YOU TO NOD OFF OR FALL ASLEEP WHILE SITTING QUIETLY AFTER LUNCH WITHOUT ALCOHOL: SLIGHT CHANCE OF DOZING
HOW LIKELY ARE YOU TO NOD OFF OR FALL ASLEEP WHEN YOU ARE A PASSENGER IN A CAR FOR AN HOUR WITHOUT A BREAK: HIGH CHANCE OF DOZING
HOW LIKELY ARE YOU TO NOD OFF OR FALL ASLEEP IN A CAR, WHILE STOPPED FOR A FEW MINUTES IN TRAFFIC: SLIGHT CHANCE OF DOZING
HOW LIKELY ARE YOU TO NOD OFF OR FALL ASLEEP WHILE LYING DOWN TO REST IN THE AFTERNOON WHEN CIRCUMSTANCES PERMIT: HIGH CHANCE OF DOZING
HOW LIKELY ARE YOU TO NOD OFF OR FALL ASLEEP WHILE SITTING INACTIVE IN A PUBLIC PLACE: HIGH CHANCE OF DOZING
HOW LIKELY ARE YOU TO NOD OFF OR FALL ASLEEP WHILE SITTING AND TALKING TO SOMEONE: SLIGHT CHANCE OF DOZING

## 2025-04-03 ENCOUNTER — TELEMEDICINE (OUTPATIENT)
Age: 35
End: 2025-04-03
Payer: MEDICAID

## 2025-04-03 VITALS
HEART RATE: 105 BPM | BODY MASS INDEX: 24.99 KG/M2 | HEIGHT: 65 IN | SYSTOLIC BLOOD PRESSURE: 77 MMHG | OXYGEN SATURATION: 94 % | TEMPERATURE: 99.2 F | WEIGHT: 150 LBS | DIASTOLIC BLOOD PRESSURE: 58 MMHG

## 2025-04-03 DIAGNOSIS — G47.33 OSA (OBSTRUCTIVE SLEEP APNEA): Primary | ICD-10-CM

## 2025-04-03 DIAGNOSIS — G47.419 NARCOLEPSY WITHOUT CATAPLEXY: ICD-10-CM

## 2025-04-03 DIAGNOSIS — Z86.59 H/O: DEPRESSION: ICD-10-CM

## 2025-04-03 DIAGNOSIS — F43.10 PTSD (POST-TRAUMATIC STRESS DISORDER): ICD-10-CM

## 2025-04-03 PROCEDURE — 99214 OFFICE O/P EST MOD 30 MIN: CPT | Performed by: NURSE PRACTITIONER

## 2025-04-03 ASSESSMENT — SLEEP AND FATIGUE QUESTIONNAIRES
HOW LIKELY ARE YOU TO NOD OFF OR FALL ASLEEP WHILE SITTING AND TALKING TO SOMEONE: WOULD NEVER DOZE
HOW LIKELY ARE YOU TO NOD OFF OR FALL ASLEEP IN A CAR, WHILE STOPPED FOR A FEW MINUTES IN TRAFFIC: WOULD NEVER DOZE
HOW LIKELY ARE YOU TO NOD OFF OR FALL ASLEEP WHILE SITTING QUIETLY AFTER LUNCH WITHOUT ALCOHOL: HIGH CHANCE OF DOZING
HOW LIKELY ARE YOU TO NOD OFF OR FALL ASLEEP IN A CAR, WHILE STOPPED FOR A FEW MINUTES IN TRAFFIC: WOULD NEVER DOZE
HOW LIKELY ARE YOU TO NOD OFF OR FALL ASLEEP WHILE WATCHING TV: WOULD NEVER DOZE
HOW LIKELY ARE YOU TO NOD OFF OR FALL ASLEEP WHILE SITTING INACTIVE IN A PUBLIC PLACE: MODERATE CHANCE OF DOZING
HOW LIKELY ARE YOU TO NOD OFF OR FALL ASLEEP WHILE SITTING QUIETLY AFTER LUNCH WITHOUT ALCOHOL: HIGH CHANCE OF DOZING
HOW LIKELY ARE YOU TO NOD OFF OR FALL ASLEEP WHEN YOU ARE A PASSENGER IN A CAR FOR AN HOUR WITHOUT A BREAK: HIGH CHANCE OF DOZING
HOW LIKELY ARE YOU TO NOD OFF OR FALL ASLEEP WHILE LYING DOWN TO REST IN THE AFTERNOON WHEN CIRCUMSTANCES PERMIT: HIGH CHANCE OF DOZING
HOW LIKELY ARE YOU TO NOD OFF OR FALL ASLEEP WHILE LYING DOWN TO REST IN THE AFTERNOON WHEN CIRCUMSTANCES PERMIT: HIGH CHANCE OF DOZING
ESS TOTAL SCORE: 13
HOW LIKELY ARE YOU TO NOD OFF OR FALL ASLEEP WHILE SITTING AND READING: MODERATE CHANCE OF DOZING
HOW LIKELY ARE YOU TO NOD OFF OR FALL ASLEEP WHILE WATCHING TV: WOULD NEVER DOZE
HOW LIKELY ARE YOU TO NOD OFF OR FALL ASLEEP WHEN YOU ARE A PASSENGER IN A CAR FOR AN HOUR WITHOUT A BREAK: HIGH CHANCE OF DOZING
HOW LIKELY ARE YOU TO NOD OFF OR FALL ASLEEP WHILE SITTING AND TALKING TO SOMEONE: WOULD NEVER DOZE
HOW LIKELY ARE YOU TO NOD OFF OR FALL ASLEEP WHILE SITTING INACTIVE IN A PUBLIC PLACE: MODERATE CHANCE OF DOZING
HOW LIKELY ARE YOU TO NOD OFF OR FALL ASLEEP WHILE SITTING AND READING: MODERATE CHANCE OF DOZING

## 2025-04-03 NOTE — PROGRESS NOTES
[address]   [place] [address]   Tel.  [phone]   Fax. [phone]  [address]   [place] [address]   Tel.  [phone]   Fax. [phone] [address] [place] [address]   Tel.  [phone]   Fax. [phone]     Carrie Aguilera (: 1990) is a 32 y.o. female, established patient, seen for positive airway pressure follow-up, she was last seen by me on 2023, previously seen by Dr. Skinner on 2022, prior notes reviewed in detail.  Home sleep test 2022 showed AHI of 14/hr with a lowest  SpO2 of 84%. A subsequent titration study 2023 showed events responding to CPAP therapy. She is seen today for follow up.     ASSESSMENT/PLAN:   Diagnosis Orders   1. PIPPA (obstructive sleep apnea)  POLYSOMNOGRAPHY 1 NIGHT      2. Narcolepsy without cataplexy  POLYSOMNOGRAPHY 1 NIGHT    POLYSOMNOGRAPHY (MSLT)    7-DRUG SCREEN, URINE      3. H/O: depression        4. PTSD (post-traumatic stress disorder)          AHI = 14 ().  On APAP :  5-7 cmH2O. Set up 10/2022     She is adherent with PAP therapy and PAP continues to benefit patient and remains necessary for control of her sleep apnea.     No follow-up provider specified.    Sleep Apnea -  She is doing well on PAP. She feels she is sleeping well throughout the night.     *  Counseling was provided regarding the importance of regular PAP use with emphasis on ensuring sufficient total sleep time, proper sleep hygiene, and safe driving.    * Re-enforced proper and regular cleaning for the device.    * She was asked to contact our office for any problems regarding PAP therapy.    2. Narcolepsy without cataplexy - She reports her symptoms of excessive daytime sleepiness have been going on for about 6 months. She notes she may doze often throughout the day. She reports this is affecting her job. She feels \"intense fatigue\" spells throughout the day where she feels a strong urge to fall asleep. She

## 2025-04-03 NOTE — PATIENT INSTRUCTIONS
[address]  [place] [address]  Tel.  [phone]  Fax. [phone] [address]  [place] [address]  Tel.  [phone]  Fax. [phone] [address] [place] [address]  Tel.  [phone]  Fax. [phone]     PROPER SLEEP HYGIENE    What to avoid  Do not have drinks with caffeine, such as coffee or black tea, for 8 hours before bed.  Do not smoke or use other types of tobacco near bedtime. Nicotine is a stimulant and can keep you awake.  Avoid drinking alcohol late in the evening, because it can cause you to wake in the middle of the night.  Do not eat a big meal close to bedtime. If you are hungry, eat a light snack.  Do not drink a lot of water close to bedtime, because the need to urinate may wake you up during the night.  Do not read or watch TV in bed. Use the bed only for sleeping and sexual activity.  What to try  Go to bed at the same time every night, and wake up at the same time every morning. Do not take naps during the day.  Keep your bedroom quiet, dark, and cool.  Get regular exercise, but not within 3 to 4 hours of your bedtime..  Sleep on a comfortable pillow and mattress.  If watching the clock makes you anxious, turn it facing away from you so you cannot see the time.  If you worry when you lie down, start a worry book. Well before bedtime, write down your worries, and then set the book and your concerns aside.  Try meditation or other relaxation techniques before you go to bed.  If you cannot fall asleep, get up and go to another room until you feel sleepy. Do something relaxing. Repeat your bedtime routine before you go to bed again.  Make your house quiet and calm about an hour before bedtime. Turn down the lights, turn off the TV, log off the computer, and turn down the volume on music. This can help you relax after a busy day.    Drowsy Driving  The [place] National Highway Traffic Safety Administration cites drowsiness as a  Detail Level: Detailed Add 18707 Cpt? (Important Note: In 2017 The Use Of 99231 Is Being Tracked By Cms To Determine Future Global Period Reimbursement For Global Periods): yes Additional Comments: No sign of infection. Discussed daily skin care. Follow up sooner for any changes in symptoms\\nRx given for mupirocin

## 2025-04-06 ENCOUNTER — PATIENT MESSAGE (OUTPATIENT)
Age: 35
End: 2025-04-06

## 2025-04-06 DIAGNOSIS — G47.33 OSA (OBSTRUCTIVE SLEEP APNEA): Primary | ICD-10-CM

## 2025-04-07 ENCOUNTER — HOSPITAL ENCOUNTER (OUTPATIENT)
Facility: HOSPITAL | Age: 35
Discharge: HOME | End: 2025-04-10
Payer: MEDICAID

## 2025-04-07 DIAGNOSIS — F33.1 MODERATE EPISODE OF RECURRENT MAJOR DEPRESSIVE DISORDER (HCC): Primary | ICD-10-CM

## 2025-04-07 DIAGNOSIS — T74.32XS VICTIM OF CHILDHOOD EMOTIONAL ABUSE, SEQUELA: ICD-10-CM

## 2025-04-07 DIAGNOSIS — F39 UNSPECIFIED MOOD (AFFECTIVE) DISORDER: ICD-10-CM

## 2025-04-07 DIAGNOSIS — F51.01 PRIMARY INSOMNIA: ICD-10-CM

## 2025-04-07 DIAGNOSIS — F41.1 GAD (GENERALIZED ANXIETY DISORDER): ICD-10-CM

## 2025-04-07 PROCEDURE — 90792 PSYCH DIAG EVAL W/MED SRVCS: CPT | Performed by: MIDWIFE

## 2025-04-07 RX ORDER — ARIPIPRAZOLE 10 MG/1
10 TABLET ORAL DAILY
Qty: 30 TABLET | Refills: 2 | Status: SHIPPED | OUTPATIENT
Start: 2025-04-07 | End: 2025-07-06

## 2025-04-07 RX ORDER — ATORVASTATIN CALCIUM 40 MG/1
40 TABLET, FILM COATED ORAL DAILY
Qty: 30 TABLET | Refills: 0 | Status: SHIPPED | OUTPATIENT
Start: 2025-04-07

## 2025-04-07 RX ORDER — TRAZODONE HYDROCHLORIDE 50 MG/1
50 TABLET ORAL NIGHTLY
Qty: 30 TABLET | Refills: 0 | Status: SHIPPED | OUTPATIENT
Start: 2025-04-07 | End: 2025-05-07

## 2025-04-07 RX ORDER — ESCITALOPRAM OXALATE 10 MG/1
10 TABLET ORAL DAILY
Qty: 30 TABLET | Refills: 0 | Status: SHIPPED | OUTPATIENT
Start: 2025-04-07 | End: 2025-05-07

## 2025-04-07 RX ORDER — DULOXETIN HYDROCHLORIDE 60 MG/1
60 CAPSULE, DELAYED RELEASE ORAL 2 TIMES DAILY
Qty: 60 CAPSULE | Refills: 2 | Status: SHIPPED | OUTPATIENT
Start: 2025-04-07 | End: 2025-07-06

## 2025-04-07 NOTE — BH NOTE
[facility] Outpatient Behavioral Health  [facility]  [facility]     Name: Carrie Aguilera                                              MRN:  [id number]           Follow up Medication Management Visit     Collateral: Maria G Li     Patient Identification: Carrie is a 33 YO single  female. She works as a  in [place], sometimes part time, sometimes full time. She has one 7 YO son, Rohit who is in public school. They live with her mother, who is a . Four cats, one dog, ducks, chickens, fish tanks.      Carrie is  a past patient of Rupa Wilson's here at [facility], sees PCP Marley Mathur, and is a patient of [facility] (Dr. Johnson, then ALVA Martínez).     Last Visit:     Clinical Formulation: Carrie has valid concerns about her impulsive and angry reaction to being attacked by a rooster, as in retrospect, she would rather have dealt with the bird differently due to the presence of her son and mother. She most likely suffers from an unspecified mood disorder which does not meet criteria for cyclothymia, bipolar II or bipolar I. She does have symptoms indicative of anxiety and depression, which may respond to titration of her duloxetine. After her anxiety and depression are successfully treated, she may need an increase in her aripiprazole to address anger and irritability. She may also benefit from treatment of symptoms of ADHD. Her sleep may improve with sleep hygiene practices. Therapy is recommended, although she is not amenable at this time.       Today:      CC: Patient presents for ongoing medication management of mood disorder, anxiety, depression, and insomnia.     HPI: Carrie completed neuropsychological evaluation by Dr. Mckee, who states Carrie does not have ADHD but does have PTSD along with anxiety and depression. Medication adjustments and trauma-related therapy were recommended. She is now ready to see a

## 2025-04-08 NOTE — TELEPHONE ENCOUNTER
Orders Placed This Encounter   Procedures    DME Order for (Specify) as OP     Diagnosis: (G47.33) PIPPA (obstructive sleep apnea)  (primary encounter diagnosis)     Replacement Supplies for Positive Airway Pressure Therapy Device:   Duration of need: 99 months.      Nasal Pillows Combo Mask (Replace) 2 per month.   Nasal Pillows (Replace) 2 per month.    Nasal Cushion (Replace) 2 per month.   Nasal Interface Mask 1 every 3 months.         Headgear 1 every 6 months.   Positive Airway Pressure chinstrap 1 every 6 months.     Tubing with heating element 1 every 3 months.     Filter(s) Disposable 2 per month.   Filter(s) Non-Disposable 1 every 6 months.   .    Water Chamber for Humidifier (Replace) 1 every 6 months.    AGNIESZKA Kramer NPI: [id number]    Electronically signed. Date:- 04/08/25     AGNIESZKA Kramer, [facility]   Nurse Practitioner  [facility] [place]

## 2025-04-21 ENCOUNTER — HOSPITAL ENCOUNTER (OUTPATIENT)
Facility: HOSPITAL | Age: 35
Discharge: HOME | End: 2025-04-24
Payer: MEDICAID

## 2025-04-21 DIAGNOSIS — F90.2 ADHD (ATTENTION DEFICIT HYPERACTIVITY DISORDER), COMBINED TYPE: ICD-10-CM

## 2025-04-21 DIAGNOSIS — F41.1 GAD (GENERALIZED ANXIETY DISORDER): ICD-10-CM

## 2025-04-21 DIAGNOSIS — F39 UNSPECIFIED MOOD (AFFECTIVE) DISORDER: ICD-10-CM

## 2025-04-21 DIAGNOSIS — F33.1 MODERATE EPISODE OF RECURRENT MAJOR DEPRESSIVE DISORDER (HCC): Primary | ICD-10-CM

## 2025-04-21 DIAGNOSIS — F51.01 PRIMARY INSOMNIA: ICD-10-CM

## 2025-04-21 PROCEDURE — 99214 OFFICE O/P EST MOD 30 MIN: CPT | Performed by: MIDWIFE

## 2025-04-21 RX ORDER — ARIPIPRAZOLE 15 MG/1
15 TABLET ORAL DAILY
Qty: 30 TABLET | Refills: 0 | Status: SHIPPED | OUTPATIENT
Start: 2025-04-21 | End: 2025-05-21

## 2025-04-21 RX ORDER — TRAZODONE HYDROCHLORIDE 100 MG/1
100 TABLET ORAL NIGHTLY
Qty: 30 TABLET | Refills: 0 | Status: SHIPPED | OUTPATIENT
Start: 2025-04-21 | End: 2025-05-21

## 2025-04-21 NOTE — BH NOTE
[facility] Outpatient Behavioral Health  [facility]  [facility]     Name: Carrie Aguilera                                              MRN:  [id number]           Follow up Medication Management Visit     Collateral: Maria G Li     Patient Identification: Carrie is a 33 YO single  female. She works as a  in [place], sometimes part time, sometimes full time. She has one 7 YO son, Rohit who is in public school. They live with her mother, who is a . Four cats, one dog, ducks, chickens, fish tanks.      Carrie is  a past patient of Rupa Wilson's here at [facility], sees PCP Marley Mathur, and is a patient of [facility] (Dr. Johnson, then ALVA Martínez).     Last Visit:     Clinical Formulation: Carrie has valid concerns about her impulsive and angry reaction to being attacked by a rooster, as in retrospect, she would rather have dealt with the bird differently due to the presence of her son and mother. She most likely suffers from an unspecified mood disorder which does not meet criteria for cyclothymia, bipolar II or bipolar I. She does have symptoms indicative of anxiety and depression, which may respond to titration of her duloxetine. After her anxiety and depression are successfully treated, she may need an increase in her aripiprazole to address anger and irritability. She may also benefit from treatment of symptoms of ADHD. Her sleep may improve with sleep hygiene practices. Therapy is recommended, although she is not amenable at this time.       Today:      CC: Patient presents for ongoing medication management of mood disorder, anxiety, depression, and insomnia.     HPI:   Carrie only sleeps for 3 hours now that she has finished weaning from Ambien and started trazodone; is now taking 50 mg. Takes her an hour to fall back to sleep, she then sleeps only 2 hours. She has not had any ill effects from the switch and does

## 2025-04-24 ENCOUNTER — CLINICAL DOCUMENTATION (OUTPATIENT)
Age: 35
End: 2025-04-24

## 2025-05-04 RX ORDER — BLOOD-GLUCOSE SENSOR
EACH MISCELLANEOUS
Qty: 6 EACH | Refills: 5 | Status: SHIPPED | OUTPATIENT
Start: 2025-05-04

## 2025-05-06 DIAGNOSIS — E11.65 POORLY CONTROLLED DIABETES MELLITUS (HCC): ICD-10-CM

## 2025-05-06 DIAGNOSIS — E11.65 TYPE 2 DIABETES MELLITUS WITH HYPERGLYCEMIA, WITHOUT LONG-TERM CURRENT USE OF INSULIN (HCC): ICD-10-CM

## 2025-05-08 ENCOUNTER — TELEPHONE (OUTPATIENT)
Age: 35
End: 2025-05-08

## 2025-05-08 ENCOUNTER — RESULTS FOLLOW-UP (OUTPATIENT)
Age: 35
End: 2025-05-08

## 2025-05-08 ENCOUNTER — OFFICE VISIT (OUTPATIENT)
Age: 35
End: 2025-05-08
Payer: MEDICAID

## 2025-05-08 VITALS
HEIGHT: 65 IN | SYSTOLIC BLOOD PRESSURE: 110 MMHG | RESPIRATION RATE: 20 BRPM | OXYGEN SATURATION: 97 % | WEIGHT: 245 LBS | DIASTOLIC BLOOD PRESSURE: 70 MMHG | BODY MASS INDEX: 40.82 KG/M2 | HEART RATE: 84 BPM | TEMPERATURE: 97.7 F

## 2025-05-08 DIAGNOSIS — E11.65 POORLY CONTROLLED DIABETES MELLITUS (HCC): ICD-10-CM

## 2025-05-08 DIAGNOSIS — J02.9 SORE THROAT: Primary | ICD-10-CM

## 2025-05-08 DIAGNOSIS — E11.65 TYPE 2 DIABETES MELLITUS WITH HYPERGLYCEMIA, WITHOUT LONG-TERM CURRENT USE OF INSULIN (HCC): ICD-10-CM

## 2025-05-08 DIAGNOSIS — R05.1 ACUTE COUGH: ICD-10-CM

## 2025-05-08 LAB
EXP DATE SOLUTION: NORMAL
EXP DATE SWAB: NORMAL
EXPIRATION DATE: NORMAL
LOT NUMBER POC: NORMAL
LOT NUMBER SOLUTION: NORMAL
LOT NUMBER SWAB: NORMAL
SARS-COV-2 RNA, POC: NEGATIVE
STREP PYOGENES DNA, POC: NEGATIVE
VALID INTERNAL CONTROL, POC: YES

## 2025-05-08 PROCEDURE — 87651 STREP A DNA AMP PROBE: CPT | Performed by: NURSE PRACTITIONER

## 2025-05-08 PROCEDURE — 99213 OFFICE O/P EST LOW 20 MIN: CPT | Performed by: NURSE PRACTITIONER

## 2025-05-08 PROCEDURE — 87635 SARS-COV-2 COVID-19 AMP PRB: CPT | Performed by: NURSE PRACTITIONER

## 2025-05-08 RX ORDER — ALBUTEROL SULFATE 90 UG/1
2 INHALANT RESPIRATORY (INHALATION) EVERY 4 HOURS PRN
Qty: 18 G | Refills: 11 | Status: SHIPPED | OUTPATIENT
Start: 2025-05-08

## 2025-05-08 RX ORDER — FEXOFENADINE HCL AND PSEUDOEPHEDRINE HCL 180; 240 MG/1; MG/1
1 TABLET, EXTENDED RELEASE ORAL DAILY
Qty: 30 TABLET | Refills: 5 | Status: SHIPPED | OUTPATIENT
Start: 2025-05-08

## 2025-05-08 RX ORDER — ESCITALOPRAM OXALATE 10 MG/1
10 TABLET ORAL DAILY
Qty: 30 TABLET | Refills: 2 | Status: SHIPPED | OUTPATIENT
Start: 2025-05-08 | End: 2025-08-06

## 2025-05-08 RX ORDER — LEVOCETIRIZINE DIHYDROCHLORIDE 5 MG/1
5 TABLET, FILM COATED ORAL DAILY
Qty: 90 TABLET | Refills: 0 | Status: SHIPPED | OUTPATIENT
Start: 2025-05-08

## 2025-05-08 RX ORDER — FENOFIBRATE 145 MG/1
145 TABLET, FILM COATED ORAL DAILY
Qty: 90 TABLET | Refills: 0 | Status: SHIPPED | OUTPATIENT
Start: 2025-05-08

## 2025-05-08 RX ORDER — ATORVASTATIN CALCIUM 40 MG/1
40 TABLET, FILM COATED ORAL DAILY
Qty: 30 TABLET | Refills: 0 | Status: SHIPPED | OUTPATIENT
Start: 2025-05-08

## 2025-05-08 RX ORDER — FLUTICASONE PROPIONATE 50 MCG
2 SPRAY, SUSPENSION (ML) NASAL DAILY
Qty: 16 G | Refills: 5 | Status: SHIPPED | OUTPATIENT
Start: 2025-05-08

## 2025-05-08 ASSESSMENT — PATIENT HEALTH QUESTIONNAIRE - PHQ9
SUM OF ALL RESPONSES TO PHQ QUESTIONS 1-9: 0
7. TROUBLE CONCENTRATING ON THINGS, SUCH AS READING THE NEWSPAPER OR WATCHING TELEVISION: NOT AT ALL
SUM OF ALL RESPONSES TO PHQ QUESTIONS 1-9: 0
SUM OF ALL RESPONSES TO PHQ QUESTIONS 1-9: 0
5. POOR APPETITE OR OVEREATING: NOT AT ALL
8. MOVING OR SPEAKING SO SLOWLY THAT OTHER PEOPLE COULD HAVE NOTICED. OR THE OPPOSITE, BEING SO FIGETY OR RESTLESS THAT YOU HAVE BEEN MOVING AROUND A LOT MORE THAN USUAL: NOT AT ALL
SUM OF ALL RESPONSES TO PHQ QUESTIONS 1-9: 0
6. FEELING BAD ABOUT YOURSELF - OR THAT YOU ARE A FAILURE OR HAVE LET YOURSELF OR YOUR FAMILY DOWN: NOT AT ALL
10. IF YOU CHECKED OFF ANY PROBLEMS, HOW DIFFICULT HAVE THESE PROBLEMS MADE IT FOR YOU TO DO YOUR WORK, TAKE CARE OF THINGS AT HOME, OR GET ALONG WITH OTHER PEOPLE: NOT DIFFICULT AT ALL
3. TROUBLE FALLING OR STAYING ASLEEP: NOT AT ALL
1. LITTLE INTEREST OR PLEASURE IN DOING THINGS: NOT AT ALL
2. FEELING DOWN, DEPRESSED OR HOPELESS: NOT AT ALL
4. FEELING TIRED OR HAVING LITTLE ENERGY: NOT AT ALL
9. THOUGHTS THAT YOU WOULD BE BETTER OFF DEAD, OR OF HURTING YOURSELF: NOT AT ALL

## 2025-05-08 NOTE — PROGRESS NOTES
Duration 30 minutes primarily education, review of imaging, labs and records.    1. Sore throat    - AMB POC COVID-19 COV  - AMB POC STREP GO A DIRECT, DNA PROBE    2. Acute cough    - AMB POC COVID-19 COV  - AMB POC STREP GO A DIRECT, DNA PROBE       Chief Complaint   Patient presents with    Pharyngitis    Cough     yesterday       Assessment & Plan  Sore throat   Acute condition, new, Supportive care with appropriate antipyretics and fluids.    Orders:    AMB POC COVID-19 COV    AMB POC STREP GO A DIRECT, DNA PROBE    Acute cough   Acute condition, new, Supportive care with appropriate antipyretics and fluids.    Orders:    AMB POC COVID-19 COV    AMB POC STREP GO A DIRECT, DNA PROBE        History of Present Illness  The patient presents for evaluation of influenza-like symptoms.    She began experiencing symptoms yesterday, initially presenting as a persistent dry sensation in the posterior pharynx, accompanied by dysphagia. Despite adequate hydration, the symptoms persisted. Overnight, she developed a cough, postnasal drip, nasal congestion, and continued dysphagia. She reports no recent exposure to sick individuals or the presence of myalgia or chills. She has been spending significant time outdoors this week due to gardening activities. She reports no wheezing but notes that coughing induces dyspnea. She does not possess an inhaler. She is allergic to cats. She has been on a long-term regimen of Xyzal for allergies and uses Flonase nasal spray, for which she has requested a refill today.    She continues to consult with her endocrinologist, although their last in-person visit was some time ago. She has a virtual appointment scheduled for 07/2025. She underwent an ophthalmological examination a few months prior, during which she was informed of a potential risk for glaucoma, but not to a degree that necessitates immediate concern. A follow-up appointment is scheduled for the following year. She received an

## 2025-05-08 NOTE — TELEPHONE ENCOUNTER
Prior auth started for 24HR Allergy & Congestion Reli 180-240MG er tablets using cover my meds key [id number]

## 2025-05-08 NOTE — PROGRESS NOTES
\"Have you been to the ER, urgent care clinic since your last visit?  Hospitalized since your last visit?\"    NO    “Have you seen or consulted any other health care providers outside of [facility] since your last visit?”    NO            Click Here for Release of Records Request      Chief Complaint   Patient presents with    Pharyngitis    Cough     yesterday         Vitals:    05/08/25 1030   BP: 110/70   Pulse: 84   Resp: 20   Temp: 97.7 °F (36.5 °C)   SpO2: 97%

## 2025-05-18 NOTE — BH NOTE
[facility] Outpatient Behavioral Health  [facility]  Milly Patricio [facility]     Name: Carrie Aguilera                                              MRN:  [id number]           Follow up Medication Management Visit     Collateral: none     Patient Identification: Carrie is a 35 YO single  female. She works as a  in [place], sometimes part time, sometimes full time. She has one 9 YO son, Rohit who is in public school. They live with her mother, who is a . Four cats, one dog, ducks, chickens, fish tanks.      Carrie is  a past patient of Rupa Wilson's here at [facility], sees PCP Marley Mathur, and is a patient of [facility] (Dr. Johnson, then [facility] Merly Martínez).     Last Visit:     Clinical Formulation: Carrie has valid concerns about her impulsive and angry reaction to being attacked by a rooster, as in retrospect, she would rather have dealt with the bird differently due to the presence of her son and mother. She most likely suffers from an unspecified mood disorder which does not meet criteria for cyclothymia, bipolar II or bipolar I. She does have symptoms indicative of anxiety and depression, which may respond to titration of her duloxetine. After her anxiety and depression are successfully treated, she may need an increase in her aripiprazole to address anger and irritability. She may also benefit from treatment of symptoms of ADHD. Her sleep may improve with sleep hygiene practices. Therapy is recommended, although she is not amenable at this time.       Today:      CC: Patient presents for ongoing medication management of mood disorder, anxiety, depression, and insomnia.     HPI: Carrie is struggling with continued depression, mostly due to situational stressors. School is almost finished for Winter/Spring and she is looking for summer work. Has applied for a [employer] position in [place] and was declined. Another such position

## 2025-05-19 ENCOUNTER — HOSPITAL ENCOUNTER (OUTPATIENT)
Facility: HOSPITAL | Age: 35
Discharge: HOME | End: 2025-05-22
Payer: MEDICAID

## 2025-05-19 DIAGNOSIS — F33.1 MODERATE EPISODE OF RECURRENT MAJOR DEPRESSIVE DISORDER (HCC): Primary | ICD-10-CM

## 2025-05-19 DIAGNOSIS — F41.1 GAD (GENERALIZED ANXIETY DISORDER): ICD-10-CM

## 2025-05-19 DIAGNOSIS — F51.01 PRIMARY INSOMNIA: ICD-10-CM

## 2025-05-19 DIAGNOSIS — F90.2 ADHD (ATTENTION DEFICIT HYPERACTIVITY DISORDER), COMBINED TYPE: ICD-10-CM

## 2025-05-19 DIAGNOSIS — F39 UNSPECIFIED MOOD (AFFECTIVE) DISORDER: ICD-10-CM

## 2025-05-19 PROCEDURE — 99214 OFFICE O/P EST MOD 30 MIN: CPT | Performed by: MIDWIFE

## 2025-05-19 RX ORDER — TRAZODONE HYDROCHLORIDE 100 MG/1
100 TABLET ORAL NIGHTLY PRN
Qty: 30 TABLET | Refills: 2 | Status: SHIPPED | OUTPATIENT
Start: 2025-05-19 | End: 2025-08-17

## 2025-05-19 RX ORDER — ARIPIPRAZOLE 15 MG/1
15 TABLET ORAL DAILY
Qty: 30 TABLET | Refills: 2 | Status: SHIPPED | OUTPATIENT
Start: 2025-05-19 | End: 2025-08-17

## 2025-05-19 RX ORDER — ESCITALOPRAM OXALATE 20 MG/1
20 TABLET ORAL DAILY
Qty: 30 TABLET | Refills: 2 | Status: SHIPPED | OUTPATIENT
Start: 2025-05-19 | End: 2025-08-17

## 2025-05-22 ENCOUNTER — OFFICE VISIT (OUTPATIENT)
Age: 35
End: 2025-05-22
Payer: MEDICAID

## 2025-05-22 VITALS
SYSTOLIC BLOOD PRESSURE: 100 MMHG | HEART RATE: 94 BPM | DIASTOLIC BLOOD PRESSURE: 70 MMHG | OXYGEN SATURATION: 98 % | RESPIRATION RATE: 18 BRPM | WEIGHT: 242 LBS | HEIGHT: 65 IN | TEMPERATURE: 97 F | BODY MASS INDEX: 40.32 KG/M2

## 2025-05-22 DIAGNOSIS — F41.1 GAD (GENERALIZED ANXIETY DISORDER): ICD-10-CM

## 2025-05-22 DIAGNOSIS — E78.1 HYPERTRIGLYCERIDEMIA: ICD-10-CM

## 2025-05-22 DIAGNOSIS — Z13.21 ENCOUNTER FOR VITAMIN DEFICIENCY SCREENING: ICD-10-CM

## 2025-05-22 DIAGNOSIS — E11.9 TYPE 2 DIABETES MELLITUS WITHOUT COMPLICATION, WITHOUT LONG-TERM CURRENT USE OF INSULIN (HCC): Primary | ICD-10-CM

## 2025-05-22 DIAGNOSIS — Z13.29 SCREENING FOR THYROID DISORDER: ICD-10-CM

## 2025-05-22 DIAGNOSIS — E11.9 COMPREHENSIVE DIABETIC FOOT EXAMINATION, TYPE 2 DM, ENCOUNTER FOR (HCC): ICD-10-CM

## 2025-05-22 DIAGNOSIS — K76.0 NAFLD (NONALCOHOLIC FATTY LIVER DISEASE): ICD-10-CM

## 2025-05-22 DIAGNOSIS — S99.911S INJURY OF RIGHT ANKLE, SEQUELA: ICD-10-CM

## 2025-05-22 PROCEDURE — 36415 COLL VENOUS BLD VENIPUNCTURE: CPT | Performed by: NURSE PRACTITIONER

## 2025-05-22 PROCEDURE — 99214 OFFICE O/P EST MOD 30 MIN: CPT | Performed by: NURSE PRACTITIONER

## 2025-05-22 PROCEDURE — 3044F HG A1C LEVEL LT 7.0%: CPT | Performed by: NURSE PRACTITIONER

## 2025-05-22 ASSESSMENT — PATIENT HEALTH QUESTIONNAIRE - PHQ9
8. MOVING OR SPEAKING SO SLOWLY THAT OTHER PEOPLE COULD HAVE NOTICED. OR THE OPPOSITE, BEING SO FIGETY OR RESTLESS THAT YOU HAVE BEEN MOVING AROUND A LOT MORE THAN USUAL: NOT AT ALL
10. IF YOU CHECKED OFF ANY PROBLEMS, HOW DIFFICULT HAVE THESE PROBLEMS MADE IT FOR YOU TO DO YOUR WORK, TAKE CARE OF THINGS AT HOME, OR GET ALONG WITH OTHER PEOPLE: NOT DIFFICULT AT ALL
SUM OF ALL RESPONSES TO PHQ QUESTIONS 1-9: 0
SUM OF ALL RESPONSES TO PHQ QUESTIONS 1-9: 0
6. FEELING BAD ABOUT YOURSELF - OR THAT YOU ARE A FAILURE OR HAVE LET YOURSELF OR YOUR FAMILY DOWN: NOT AT ALL
2. FEELING DOWN, DEPRESSED OR HOPELESS: NOT AT ALL
SUM OF ALL RESPONSES TO PHQ QUESTIONS 1-9: 0
SUM OF ALL RESPONSES TO PHQ QUESTIONS 1-9: 0
5. POOR APPETITE OR OVEREATING: NOT AT ALL
1. LITTLE INTEREST OR PLEASURE IN DOING THINGS: NOT AT ALL
3. TROUBLE FALLING OR STAYING ASLEEP: NOT AT ALL
4. FEELING TIRED OR HAVING LITTLE ENERGY: NOT AT ALL
9. THOUGHTS THAT YOU WOULD BE BETTER OFF DEAD, OR OF HURTING YOURSELF: NOT AT ALL
7. TROUBLE CONCENTRATING ON THINGS, SUCH AS READING THE NEWSPAPER OR WATCHING TELEVISION: NOT AT ALL

## 2025-05-22 NOTE — ASSESSMENT & PLAN NOTE
Chronic, at goal (stable), continue current treatment plan  Followed by endocrinologist  Orders:    COLLECTION VENOUS BLOOD,VENIPUNCTURE; Future    Lipid Panel; Future    Comprehensive Metabolic Panel; Future    CBC with Auto Differential; Future    Hemoglobin A1C; Future    Albumin/Creatinine Ratio, Urine; Future     DIABETES FOOT EXAM

## 2025-05-22 NOTE — ASSESSMENT & PLAN NOTE
Doing well with lifestyle changes  Orders:    COLLECTION VENOUS BLOOD,VENIPUNCTURE; Future    Lipid Panel; Future    Comprehensive Metabolic Panel; Future    CBC with Auto Differential; Future    Hemoglobin A1C; Future    Albumin/Creatinine Ratio, Urine; Future

## 2025-05-22 NOTE — PROGRESS NOTES
\"Have you been to the ER, urgent care clinic since your last visit?  Hospitalized since your last visit?\"    NO    “Have you seen or consulted any other health care providers outside of [facility] since your last visit?”    NO            Click Here for Release of Records Request      Chief Complaint   Patient presents with    Diabetes     Check up         Vitals:    05/22/25 0759   BP: 100/70   Pulse: 94   Resp: 18   Temp: 97 °F (36.1 °C)   SpO2: 98%     
levocetirizine (XYZAL) 5 MG tablet, Take 1 tablet by mouth once daily, Disp: 90 tablet, Rfl: 0    fexofenadine-pseudoephedrine (ALLEGRA-D 24HR) 180-240 MG per extended release tablet, Take 1 tablet by mouth daily, Disp: 30 tablet, Rfl: 5    fluticasone (FLONASE) 50 MCG/ACT nasal spray, 2 sprays by Nasal route daily, Disp: 16 g, Rfl: 5    albuterol sulfate HFA (PROVENTIL;VENTOLIN;PROAIR) 108 (90 Base) MCG/ACT inhaler, Inhale 2 puffs into the lungs every 4 hours as needed for Shortness of Breath, Disp: 18 g, Rfl: 11    DULoxetine (CYMBALTA) 60 MG extended release capsule, Take 1 capsule by mouth 2 times daily, Disp: 60 capsule, Rfl: 2    glipiZIDE (GLUCOTROL) 5 MG tablet, , Disp: , Rfl:     metFORMIN (GLUCOPHAGE-XR) 500 MG extended release tablet, in the morning and at bedtime, Disp: , Rfl:     norethindrone (MICRONOR) 0.35 MG tablet, Take 1 tablet by mouth daily, Disp: , Rfl:     Dulaglutide 4.5 MG/0.5ML SOPN, Inject 4.5 mg into the skin once a week, Disp: 12 Adjustable Dose Pre-filled Pen Syringe, Rfl: 3    omeprazole (PRILOSEC) 20 MG delayed release capsule, Take 1 capsule by mouth daily, Disp: 30 capsule, Rfl: 2    glucose monitoring kit, Use to check blood sugar daily E11.65, Disp: 1 kit, Rfl: 0    Continuous Blood Gluc  (FREESTYLE SANDY READER) RICH, Check glucose twice a day and as needed. DX E11.65, Disp: 1 each, Rfl: 0    blood glucose monitor strips, Use to check blood sugar daily E11.65, Disp: 100 strip, Rfl: 3    Lancets MISC, Use to check blood sugar daily E11.65, Disp: 100 each, Rfl: 3    Past Medical History:   Diagnosis Date    ADD (attention deficit disorder)     Bronchial spasms     Celiac disease 05/2019    Celiac disease     Depression 5/9/2019    Diabetes mellitus (HCC)     Fatty liver     Fracture, ankle 06/14/2020    right    GERD (gastroesophageal reflux disease)     Herpes simplex virus (HSV) infection     Hx of herpes simplex infection 05/13/2009    lips    Panic     PTSD

## 2025-05-22 NOTE — ASSESSMENT & PLAN NOTE
Orders:    COLLECTION VENOUS BLOOD,VENIPUNCTURE; Future    Lipid Panel; Future    Comprehensive Metabolic Panel; Future    CBC with Auto Differential; Future    Hemoglobin A1C; Future    Albumin/Creatinine Ratio, Urine; Future     DIABETES FOOT EXAM    Hepatic Function Panel; Future

## 2025-05-23 ENCOUNTER — RESULTS FOLLOW-UP (OUTPATIENT)
Age: 35
End: 2025-05-23

## 2025-05-23 LAB
ALBUMIN SERPL-MCNC: 4.7 G/DL (ref 3.9–4.9)
ALBUMIN/CREAT UR: 4 MG/G CREAT (ref 0–29)
ALP SERPL-CCNC: 66 IU/L (ref 44–121)
ALT SERPL-CCNC: 105 IU/L (ref 0–32)
AST SERPL-CCNC: 59 IU/L (ref 0–40)
BASOPHILS # BLD AUTO: 0.1 X10E3/UL (ref 0–0.2)
BASOPHILS NFR BLD AUTO: 1 %
BILIRUB SERPL-MCNC: 0.3 MG/DL (ref 0–1.2)
BUN SERPL-MCNC: 10 MG/DL (ref 6–20)
BUN/CREAT SERPL: 13 (ref 9–23)
CALCIUM SERPL-MCNC: 9.7 MG/DL (ref 8.7–10.2)
CHLORIDE SERPL-SCNC: 105 MMOL/L (ref 96–106)
CHOLEST SERPL-MCNC: 113 MG/DL (ref 100–199)
CO2 SERPL-SCNC: 20 MMOL/L (ref 20–29)
CREAT SERPL-MCNC: 0.78 MG/DL (ref 0.57–1)
CREAT UR-MCNC: 122.9 MG/DL
EGFRCR SERPLBLD CKD-EPI 2021: 103 ML/MIN/1.73
EOSINOPHIL # BLD AUTO: 0.3 X10E3/UL (ref 0–0.4)
EOSINOPHIL NFR BLD AUTO: 3 %
ERYTHROCYTE [DISTWIDTH] IN BLOOD BY AUTOMATED COUNT: 12.3 % (ref 11.7–15.4)
GLOBULIN SER CALC-MCNC: 2.7 G/DL (ref 1.5–4.5)
GLUCOSE SERPL-MCNC: 102 MG/DL (ref 70–99)
HBA1C MFR BLD: 6.6 % (ref 4.8–5.6)
HCT VFR BLD AUTO: 40.4 % (ref 34–46.6)
HDLC SERPL-MCNC: 19 MG/DL
HGB BLD-MCNC: 12.8 G/DL (ref 11.1–15.9)
IMM GRANULOCYTES # BLD AUTO: 0 X10E3/UL (ref 0–0.1)
IMM GRANULOCYTES NFR BLD AUTO: 0 %
LDLC SERPL CALC-MCNC: 68 MG/DL (ref 0–99)
LYMPHOCYTES # BLD AUTO: 3.7 X10E3/UL (ref 0.7–3.1)
LYMPHOCYTES NFR BLD AUTO: 30 %
MCH RBC QN AUTO: 29.6 PG (ref 26.6–33)
MCHC RBC AUTO-ENTMCNC: 31.7 G/DL (ref 31.5–35.7)
MCV RBC AUTO: 93 FL (ref 79–97)
MICROALBUMIN UR-MCNC: 4.6 UG/ML
MONOCYTES # BLD AUTO: 0.6 X10E3/UL (ref 0.1–0.9)
MONOCYTES NFR BLD AUTO: 5 %
NEUTROPHILS # BLD AUTO: 7.6 X10E3/UL (ref 1.4–7)
NEUTROPHILS NFR BLD AUTO: 61 %
PLATELET # BLD AUTO: 539 X10E3/UL (ref 150–450)
POTASSIUM SERPL-SCNC: 4.6 MMOL/L (ref 3.5–5.2)
PROT SERPL-MCNC: 7.4 G/DL (ref 6–8.5)
RBC # BLD AUTO: 4.33 X10E6/UL (ref 3.77–5.28)
SODIUM SERPL-SCNC: 140 MMOL/L (ref 134–144)
TRIGL SERPL-MCNC: 146 MG/DL (ref 0–149)
VLDLC SERPL CALC-MCNC: 26 MG/DL (ref 5–40)
WBC # BLD AUTO: 12.3 X10E3/UL (ref 3.4–10.8)

## 2025-05-27 ENCOUNTER — TELEPHONE (OUTPATIENT)
Age: 35
End: 2025-05-27

## 2025-05-28 LAB — SPECIMEN STATUS REPORT: NORMAL

## 2025-06-13 RX ORDER — ATORVASTATIN CALCIUM 40 MG/1
40 TABLET, FILM COATED ORAL DAILY
Qty: 90 TABLET | Refills: 1 | Status: SHIPPED | OUTPATIENT
Start: 2025-06-13

## 2025-06-15 ENCOUNTER — PATIENT MESSAGE (OUTPATIENT)
Age: 35
End: 2025-06-15

## 2025-06-15 ENCOUNTER — HOSPITAL ENCOUNTER (EMERGENCY)
Facility: HOSPITAL | Age: 35
Discharge: HOME | End: 2025-06-15
Attending: EMERGENCY MEDICINE
Payer: MEDICAID

## 2025-06-15 VITALS
HEIGHT: 65 IN | OXYGEN SATURATION: 100 % | SYSTOLIC BLOOD PRESSURE: 148 MMHG | TEMPERATURE: 98.4 F | WEIGHT: 245 LBS | HEART RATE: 99 BPM | BODY MASS INDEX: 40.82 KG/M2 | RESPIRATION RATE: 16 BRPM | DIASTOLIC BLOOD PRESSURE: 98 MMHG

## 2025-06-15 DIAGNOSIS — S61.215A LACERATION OF LEFT RING FINGER WITHOUT FOREIGN BODY WITHOUT DAMAGE TO NAIL, INITIAL ENCOUNTER: Primary | ICD-10-CM

## 2025-06-15 PROCEDURE — 90714 TD VACC NO PRESV 7 YRS+ IM: CPT | Performed by: EMERGENCY MEDICINE

## 2025-06-15 PROCEDURE — 90471 IMMUNIZATION ADMIN: CPT | Performed by: EMERGENCY MEDICINE

## 2025-06-15 PROCEDURE — 99282 EMERGENCY DEPT VISIT SF MDM: CPT

## 2025-06-15 PROCEDURE — 6360000002 HC RX W HCPCS: Performed by: EMERGENCY MEDICINE

## 2025-06-15 RX ORDER — LIDOCAINE HYDROCHLORIDE 10 MG/ML
10 INJECTION, SOLUTION EPIDURAL; INFILTRATION; INTRACAUDAL; PERINEURAL
Status: DISCONTINUED | OUTPATIENT
Start: 2025-06-15 | End: 2025-06-15 | Stop reason: HOSPADM

## 2025-06-15 RX ADMIN — CLOSTRIDIUM TETANI TOXOID ANTIGEN (FORMALDEHYDE INACTIVATED) AND CORYNEBACTERIUM DIPHTHERIAE TOXOID ANTIGEN (FORMALDEHYDE INACTIVATED) 0.5 ML: 5; 2 INJECTION, SUSPENSION INTRAMUSCULAR at 15:14

## 2025-06-15 ASSESSMENT — PAIN - FUNCTIONAL ASSESSMENT: PAIN_FUNCTIONAL_ASSESSMENT: NONE - DENIES PAIN

## 2025-06-15 ASSESSMENT — LIFESTYLE VARIABLES
HOW OFTEN DO YOU HAVE A DRINK CONTAINING ALCOHOL: MONTHLY OR LESS
HOW MANY STANDARD DRINKS CONTAINING ALCOHOL DO YOU HAVE ON A TYPICAL DAY: PATIENT DOES NOT DRINK

## 2025-06-24 ENCOUNTER — HOSPITAL ENCOUNTER (OUTPATIENT)
Facility: HOSPITAL | Age: 35
Discharge: HOME | End: 2025-06-27
Payer: MEDICAID

## 2025-06-24 DIAGNOSIS — F39 UNSPECIFIED MOOD (AFFECTIVE) DISORDER: ICD-10-CM

## 2025-06-24 DIAGNOSIS — F51.01 PRIMARY INSOMNIA: ICD-10-CM

## 2025-06-24 DIAGNOSIS — F41.1 GAD (GENERALIZED ANXIETY DISORDER): ICD-10-CM

## 2025-06-24 DIAGNOSIS — T74.32XS VICTIM OF CHILDHOOD EMOTIONAL ABUSE, SEQUELA: ICD-10-CM

## 2025-06-24 DIAGNOSIS — F33.1 MODERATE EPISODE OF RECURRENT MAJOR DEPRESSIVE DISORDER (HCC): Primary | ICD-10-CM

## 2025-06-24 PROCEDURE — 99214 OFFICE O/P EST MOD 30 MIN: CPT | Performed by: MIDWIFE

## 2025-06-24 RX ORDER — DULOXETIN HYDROCHLORIDE 60 MG/1
60 CAPSULE, DELAYED RELEASE ORAL 2 TIMES DAILY
Qty: 60 CAPSULE | Refills: 2 | Status: SHIPPED | OUTPATIENT
Start: 2025-06-24 | End: 2025-09-22

## 2025-06-24 RX ORDER — PRAZOSIN HYDROCHLORIDE 1 MG/1
1 CAPSULE ORAL NIGHTLY
Qty: 60 CAPSULE | Refills: 0 | Status: SHIPPED | OUTPATIENT
Start: 2025-06-24 | End: 2025-08-23

## 2025-06-24 NOTE — BH NOTE
[facility] Outpatient Behavioral Health  [facility]  Milly Patricio PM[facility]     Name: Carrie Aguilera                                              MRN:  [id number]           Follow up Medication Management Visit     Collateral: none     Patient Identification: Carrie is a 34 YO single  female. She works as a  in [place]. She has one 7 YO son, Rohit who is in public school. They live with her mother, who is a . Four cats, one dog, ducks, chickens, fish tanks.      Carrie is  a past patient of Rupa Wilson's here at [facility], sees PCP Marley Mathur, and is a patient of [facility] (Dr. Johnson, then [facility] Merly Martínez).     Last Visit:     Clinical Formulation from initial interview: Carrie has valid concerns about her impulsive and angry reaction to being attacked by a rooster, as in retrospect, she would rather have dealt with the bird differently due to the presence of her son and mother. She most likely suffers from an unspecified mood disorder which does not meet criteria for cyclothymia, bipolar II or bipolar I. She does have symptoms indicative of anxiety and depression, which may respond to titration of her duloxetine. After her anxiety and depression are successfully treated, she may need an increase in her aripiprazole to address anger and irritability. She may also benefit from treatment of symptoms of ADHD. Her sleep may improve with sleep hygiene practices. Therapy is recommended, although she is not amenable at this time.       Today:      CC: Patient presents for ongoing medication management of mood disorder, anxiety, depression, insomnia with nightmares, and PTSD    Carrie Aguilera, who was evaluated through a patient-initiated, synchronous (real-time) audio only encounter, and/or her healthcare decision maker, is aware that it is a billable service, with coverage as determined by her insurance carrier. She provided verbal

## 2025-07-16 RX ORDER — TRAZODONE HYDROCHLORIDE 100 MG/1
150 TABLET ORAL NIGHTLY PRN
Qty: 45 TABLET | Refills: 2 | Status: SHIPPED | OUTPATIENT
Start: 2025-07-16 | End: 2025-10-14

## 2025-07-23 DIAGNOSIS — R14.2 BURPING: Primary | ICD-10-CM

## 2025-07-28 NOTE — BH NOTE
[facility] Outpatient Behavioral Health  [facility]  [facility]     Name: Carrie Aguilera                                              MRN:  [id number]           Follow up Medication Management Visit     Collateral: none     Patient Identification: Carrie is a 34 YO single  female. She works as a  in [place]. Is working as a private sitter for the summer. She has one 8 YO son, Rohit who is in public school as a rising 5th grader. They live with her mother, who is a . Four cats, one dog, ducks, chickens, fish tanks.      Carrie is  a past patient of Rupa Wilson's here at [facility], sees PCP Marley Mathur, and is a patient of [facility] [place] (Dr. Johnson, then ALVA Martínez).     Last Visit:     Clinical Formulation from initial interview: Carrie has valid concerns about her impulsive and angry reaction to being attacked by a rooster, as in retrospect, she would rather have dealt with the bird differently due to the presence of her son and mother. She most likely suffers from an unspecified mood disorder which does not meet criteria for cyclothymia, bipolar II or bipolar I. She does have symptoms indicative of anxiety and depression, which may respond to titration of her duloxetine. After her anxiety and depression are successfully treated, she may need an increase in her aripiprazole to address anger and irritability. She may also benefit from treatment of symptoms of ADHD. Her sleep may improve with sleep hygiene practices. Therapy is recommended, although she is not amenable at this time.       Today:      CC: Patient presents for ongoing medication management of mood disorder, anxiety, depression, insomnia with nightmares, and PTSD     HPI:   Carrie states she is doing pretty well.  Three weeks ago, her best friend was OOT for two weeks, which was stressfull for Carrie, as her friend is a great support. She is sleeping okay.

## 2025-07-29 ENCOUNTER — HOSPITAL ENCOUNTER (OUTPATIENT)
Facility: HOSPITAL | Age: 35
Discharge: HOME | End: 2025-08-01
Payer: MEDICAID

## 2025-07-29 DIAGNOSIS — F41.1 GAD (GENERALIZED ANXIETY DISORDER): ICD-10-CM

## 2025-07-29 DIAGNOSIS — F39 UNSPECIFIED MOOD (AFFECTIVE) DISORDER: ICD-10-CM

## 2025-07-29 DIAGNOSIS — F33.1 MODERATE EPISODE OF RECURRENT MAJOR DEPRESSIVE DISORDER (HCC): Primary | ICD-10-CM

## 2025-07-29 DIAGNOSIS — F51.5 NIGHTMARES: ICD-10-CM

## 2025-07-29 DIAGNOSIS — F51.01 PRIMARY INSOMNIA: ICD-10-CM

## 2025-07-29 PROCEDURE — 99214 OFFICE O/P EST MOD 30 MIN: CPT | Performed by: MIDWIFE

## 2025-07-29 RX ORDER — TRAZODONE HYDROCHLORIDE 100 MG/1
200 TABLET ORAL NIGHTLY PRN
Qty: 45 TABLET | Refills: 2 | Status: SHIPPED | OUTPATIENT
Start: 2025-07-29 | End: 2025-10-27

## 2025-07-29 RX ORDER — ESCITALOPRAM OXALATE 20 MG/1
20 TABLET ORAL DAILY
Qty: 30 TABLET | Refills: 2 | Status: SHIPPED | OUTPATIENT
Start: 2025-08-15 | End: 2025-11-13

## 2025-07-29 RX ORDER — ARIPIPRAZOLE 15 MG/1
15 TABLET ORAL DAILY
Qty: 30 TABLET | Refills: 2 | Status: SHIPPED | OUTPATIENT
Start: 2025-08-17 | End: 2025-11-15

## 2025-07-29 RX ORDER — PRAZOSIN HYDROCHLORIDE 2 MG/1
2 CAPSULE ORAL NIGHTLY
Qty: 60 CAPSULE | Refills: 0 | Status: SHIPPED | OUTPATIENT
Start: 2025-07-29 | End: 2025-09-27

## 2025-08-18 ENCOUNTER — OFFICE VISIT (OUTPATIENT)
Age: 35
End: 2025-08-18
Payer: MEDICAID

## 2025-08-18 VITALS
TEMPERATURE: 97.6 F | WEIGHT: 234 LBS | BODY MASS INDEX: 38.99 KG/M2 | HEIGHT: 65 IN | DIASTOLIC BLOOD PRESSURE: 70 MMHG | HEART RATE: 78 BPM | SYSTOLIC BLOOD PRESSURE: 100 MMHG | RESPIRATION RATE: 18 BRPM

## 2025-08-18 DIAGNOSIS — E11.9 TYPE 2 DIABETES MELLITUS WITHOUT COMPLICATION, WITHOUT LONG-TERM CURRENT USE OF INSULIN (HCC): ICD-10-CM

## 2025-08-18 DIAGNOSIS — F41.1 GAD (GENERALIZED ANXIETY DISORDER): ICD-10-CM

## 2025-08-18 DIAGNOSIS — R35.0 URINARY FREQUENCY: ICD-10-CM

## 2025-08-18 DIAGNOSIS — K76.0 NAFLD (NONALCOHOLIC FATTY LIVER DISEASE): Primary | ICD-10-CM

## 2025-08-18 LAB
BILIRUBIN, URINE, POC: NEGATIVE
BLOOD URINE, POC: NEGATIVE
GLUCOSE URINE, POC: NEGATIVE
KETONES, URINE, POC: NEGATIVE
LEUKOCYTE ESTERASE, URINE, POC: ABNORMAL
NITRITE, URINE, POC: NEGATIVE
PH, URINE, POC: 6 (ref 4.6–8)
PROTEIN,URINE, POC: ABNORMAL
SPECIFIC GRAVITY, URINE, POC: 1.02 (ref 1–1.03)
URINALYSIS CLARITY, POC: CLEAR
URINALYSIS COLOR, POC: YELLOW
UROBILINOGEN, POC: ABNORMAL

## 2025-08-18 PROCEDURE — 81003 URINALYSIS AUTO W/O SCOPE: CPT | Performed by: NURSE PRACTITIONER

## 2025-08-18 PROCEDURE — 3044F HG A1C LEVEL LT 7.0%: CPT | Performed by: NURSE PRACTITIONER

## 2025-08-18 PROCEDURE — 99214 OFFICE O/P EST MOD 30 MIN: CPT | Performed by: NURSE PRACTITIONER

## 2025-08-18 ASSESSMENT — PATIENT HEALTH QUESTIONNAIRE - PHQ9
7. TROUBLE CONCENTRATING ON THINGS, SUCH AS READING THE NEWSPAPER OR WATCHING TELEVISION: NOT AT ALL
SUM OF ALL RESPONSES TO PHQ QUESTIONS 1-9: 0
4. FEELING TIRED OR HAVING LITTLE ENERGY: NOT AT ALL
1. LITTLE INTEREST OR PLEASURE IN DOING THINGS: NOT AT ALL
8. MOVING OR SPEAKING SO SLOWLY THAT OTHER PEOPLE COULD HAVE NOTICED. OR THE OPPOSITE, BEING SO FIGETY OR RESTLESS THAT YOU HAVE BEEN MOVING AROUND A LOT MORE THAN USUAL: NOT AT ALL
6. FEELING BAD ABOUT YOURSELF - OR THAT YOU ARE A FAILURE OR HAVE LET YOURSELF OR YOUR FAMILY DOWN: NOT AT ALL
3. TROUBLE FALLING OR STAYING ASLEEP: NOT AT ALL
SUM OF ALL RESPONSES TO PHQ QUESTIONS 1-9: 0
SUM OF ALL RESPONSES TO PHQ QUESTIONS 1-9: 0
10. IF YOU CHECKED OFF ANY PROBLEMS, HOW DIFFICULT HAVE THESE PROBLEMS MADE IT FOR YOU TO DO YOUR WORK, TAKE CARE OF THINGS AT HOME, OR GET ALONG WITH OTHER PEOPLE: NOT DIFFICULT AT ALL
9. THOUGHTS THAT YOU WOULD BE BETTER OFF DEAD, OR OF HURTING YOURSELF: NOT AT ALL
2. FEELING DOWN, DEPRESSED OR HOPELESS: NOT AT ALL
SUM OF ALL RESPONSES TO PHQ QUESTIONS 1-9: 0
5. POOR APPETITE OR OVEREATING: NOT AT ALL

## 2025-08-19 RX ORDER — FENOFIBRATE 145 MG/1
145 TABLET, FILM COATED ORAL DAILY
Qty: 90 TABLET | Refills: 0 | Status: SHIPPED | OUTPATIENT
Start: 2025-08-19

## 2025-08-20 LAB — BACTERIA UR CULT: NORMAL

## (undated) DEVICE — MAJ-1414 SINGLE USE ADPATER BIOPSY VALV: Brand: SINGLE USE ADAPTOR BIOPSY VALVE

## (undated) DEVICE — SOLUTION IV 1000ML 20MEQ K CHL IN 0.9% SOD CHL FLX CONT

## (undated) DEVICE — SET ADMIN 16ML TBNG L100IN 2 Y INJ SITE IV PIGGY BK DISP (ORDER IN MULIPLES OF 48)

## (undated) DEVICE — MAYO STAND COVER: Brand: CONVERTORS

## (undated) DEVICE — HYPODERMIC SAFETY NEEDLE: Brand: MAGELLAN

## (undated) DEVICE — DRAPE,APERTURE,MINOR PROCEDURE: Brand: MEDLINE

## (undated) DEVICE — MARQUEE® DISPOSABLE CORE BIOPSY INSTRUMENT KIT, 16G X 16CM: Brand: MARQUEE

## (undated) DEVICE — PAD N ADH W3XL4IN POLY COT SFT PERF FLM EASILY CUT ABSRB

## (undated) DEVICE — KENDALL RADIOLUCENT FOAM MONITORING ELECTRODE RECTANGULAR SHAPE: Brand: KENDALL

## (undated) DEVICE — SKIN MARKER,REGULAR TIP WITH RULER AND LABELS: Brand: DEVON

## (undated) DEVICE — SYRINGE MED 10ML LUERLOCK TIP W/O SFTY DISP

## (undated) DEVICE — BANDAGE ADH W0.75XL3IN UNIV WVN FAB NAT GEN USE STRP N ADH

## (undated) DEVICE — INTENDED FOR TISSUE SEPARATION, AND OTHER PROCEDURES THAT REQUIRE A SHARP SURGICAL BLADE TO PUNCTURE OR CUT.: Brand: BARD-PARKER ®  SAFETY SCALPED

## (undated) DEVICE — PREMIUM WET SKIN PREP TRAY: Brand: MEDLINE INDUSTRIES, INC.